# Patient Record
Sex: FEMALE | Race: WHITE | Employment: FULL TIME | URBAN - METROPOLITAN AREA
[De-identification: names, ages, dates, MRNs, and addresses within clinical notes are randomized per-mention and may not be internally consistent; named-entity substitution may affect disease eponyms.]

---

## 2019-12-18 LAB — MAMMOGRAPHY, EXTERNAL: NORMAL

## 2020-09-08 ENCOUNTER — OFFICE VISIT (OUTPATIENT)
Dept: ORTHOPEDIC SURGERY | Age: 60
End: 2020-09-08
Payer: COMMERCIAL

## 2020-09-08 VITALS — WEIGHT: 196 LBS | TEMPERATURE: 97.8 F | HEIGHT: 63 IN | BODY MASS INDEX: 34.73 KG/M2

## 2020-09-08 DIAGNOSIS — M17.12 PRIMARY OSTEOARTHRITIS OF LEFT KNEE: Primary | ICD-10-CM

## 2020-09-08 DIAGNOSIS — M25.562 ACUTE PAIN OF LEFT KNEE: ICD-10-CM

## 2020-09-08 PROCEDURE — 20611 DRAIN/INJ JOINT/BURSA W/US: CPT | Performed by: ORTHOPAEDIC SURGERY

## 2020-09-08 PROCEDURE — 99214 OFFICE O/P EST MOD 30 MIN: CPT | Performed by: ORTHOPAEDIC SURGERY

## 2020-09-08 RX ORDER — LIDOCAINE HYDROCHLORIDE 10 MG/ML
9 INJECTION INFILTRATION; PERINEURAL ONCE
Status: COMPLETED | OUTPATIENT
Start: 2020-09-08 | End: 2020-09-08

## 2020-09-08 RX ORDER — LISINOPRIL AND HYDROCHLOROTHIAZIDE 12.5; 2 MG/1; MG/1
TABLET ORAL
COMMUNITY
Start: 2020-06-15 | End: 2021-04-30 | Stop reason: SDUPTHER

## 2020-09-08 RX ORDER — TRIAMCINOLONE ACETONIDE 40 MG/ML
40 INJECTION, SUSPENSION INTRA-ARTICULAR; INTRAMUSCULAR ONCE
Status: COMPLETED | OUTPATIENT
Start: 2020-09-08 | End: 2020-09-08

## 2020-09-08 RX ADMIN — LIDOCAINE HYDROCHLORIDE 9 ML: 10 INJECTION INFILTRATION; PERINEURAL at 16:54

## 2020-09-08 RX ADMIN — TRIAMCINOLONE ACETONIDE 40 MG: 40 INJECTION, SUSPENSION INTRA-ARTICULAR; INTRAMUSCULAR at 16:54

## 2020-09-08 NOTE — LETTER
Gait: Norm  Limp  [id number] [place]   Chair   Strech Lonita Roles  :1960     Today's Date:2020 WRS:[id number]                                                 KNEE/LEG Est  2  3  4 New  2  3   Consult  3    Pre-op         Post-op     No LOS Injection Utrasound  Injection NO Ultrasound [address] [place] KNEE 
LT Pain   M25.562             RT Pain         M25.561 LT OA     M17.12             RT OA             M17.11 LT ACL           S83.512A             RT ACL       S83.511A  
LT Chondromalacia   M22.42                   RT Chondromalacia    M22.41   
LT Internal derangement M23.92             RT Internal derangement   M23.91  
LT Loose Body        M23.42            RT Loose Body          M23.41 
LT OCD        M93.262            RT OCD       M93.261 LT lateral meniscus  S83.262A            RT lateral meniscus     S83.261A  
LT MCL       S83.412A            RT MCL       S83.411A   
LT medial menisus      S83.222A  RT medial menisus    S83.221A      
LT prepatellar bursitiS M70.42  RT prepatellar bursitis       M70.41 LT Quad tendon rupture  [id number] RT Quad tendon rupture   [id number] KNEE FRACTURE Knee FX [id number]     KneeCap FX 51033     Fibula FX   [id number] LT FX lateral condyle    S82.125A  RT FX lateral condyle     S82.124A LT FX medial condyle    S82.135A  RT FX medial condyle     S82.134A LT FX tibial spine            S82.115A  RT FX tibial spine     S82.114A LT FX transverse patella S82.035A  RT FX transverse patella S82.034A LEG FRACTURE Tibia shaft FX   [id number]      LT tibia shaft  S83.202A RT Tibia shaft  S83.201A      
LT Cellulitis L03. 116 RT cellulitis L03.115 Edema    R50.9

## 2020-09-08 NOTE — PROGRESS NOTES
Name: Reymundo Bass    : 1960  Service Dept: [address] [facility] MEDICINE       Chief Complaint   Patient presents with    Knee Pain     left        Patient's Pharmacies:    [address] [facility] [facility]  [address] [facility] [address] [address]  Phone: [phone] Fax: [phone]       Visit Vitals  Temp 97.8 °F (36.6 °C)   Ht 5' 3\" (1.6 m)   Wt 196 lb (88.9 kg)   BMI 34.72 kg/m²        No Known Allergies     Current Outpatient Medications   Medication Sig Dispense Refill    lisinopril-hydroCHLOROthiazide (PRINZIDE, ZESTORETIC) 20-12.5 mg per tablet        Current Facility-Administered Medications   Medication Dose Route Frequency Provider Last Rate Last Dose    lidocaine (XYLOCAINE) 10 mg/mL (1 %) injection 9 mL  9 mL Intra artICUlar ONCE Timothy Pimentel MD        triamcinolone acetonide (KENALOG-40) 40 mg/mL injection 40 mg  40 mg Intra artICUlar ONCE Timothy Pimentel MD            There is no problem list on file for this patient. No family history on file. Social History     Socioeconomic History    Marital status:      Spouse name: Not on file    Number of children: Not on file    Years of education: Not on file    Highest education level: Not on file   Tobacco Use    Smoking status: Never Smoker    Smokeless tobacco: Never Used   Substance and Sexual Activity    Alcohol use: Yes     Comment: occationally    Drug use: Never        Past Surgical History:   Procedure Laterality Date    HX KNEE REPLACEMENT Right 2020        Past Medical History:   Diagnosis Date    Hypertension         I have reviewed and agree with [address] [place] [address] Nw and ROS and intake form in chart and the record.      Review of Systems:   Patient is a pleasant appearing individual, appropriately dressed, well hydrated, well nourished, who is alert, appropriately oriented for age, and in no acute distress with a normal gait and normal affect who does not appear to be in any significant pain. Physical Exam:  Left Knee -Decrease range of motion with flexion, Knee arc of greater than 50 degrees, Some crepitation, Grossly neurovascularly intact, Good cap refill, No skin lesion, Moderate swelling, No gross instability, Some quadriceps weakness, Kellgren and Maykel at least grade 3    Right Knee - Full Range of Motion, No crepitation, Grossly neurovascularly intact, Good cap refill, No skin lesion, No swelling, No gross instability, No quadriceps weakness    Procedure Documentation:    Please note that ModiFace ultrasound was used to perform an ultrasound guided injection into the left knee. A pre-injection ultrasound was taken of the left knee. After the needle was placed into the anterolateral portal(s) and while the kenelog was injected another ultrasound picture confirmed the appropriate placement. The site of injection, left knee, was sterilely prepped. The injection of 40 mg Kenalog and Lidocaine was administered appropriately into left knee and the patient tolerated it well. No site reaction was identified. Appropriate dressing was placed. Consent was obtained for the injection. Encounter Diagnoses     ICD-10-CM ICD-9-CM   1. Primary osteoarthritis of left knee  M17.12 715.16   2. Acute pain of left knee  M25.562 719.46          Scribed by Josephine Bravo LPN as dictated by [facility] Cruz MD.    HPI:  The patient is here with a chief complaint of left knee pain, sharp, throbbing pain for about five months. It has been the same. Tylenol has helped. Walking makes it worse. Pain is 5/10. ROS:  10-point review of systems is positive for joint swelling and stiffness. X-rays are positive for osteoarthritis. Assessment/Plan:  1. Left knee osteoarthritic pain. Plan would be for cortisone injection. We will see the patient back in three weeks. If it helps, that is all we need to do and we will go from there.        Return to Office: Follow-up Information    None             Documentation True and Accepted Timothy Harris, MD

## 2020-09-08 NOTE — LETTER
Mary Pay 1960  
[id number]  
 
 
9/8/2020 I hereby authorize and direct Timothy Mcfarlane MD, Charmaine Stephenson, and whomever he may designate as his associate to perform upon myself the following procedure: 
 
Injection of: Kenalog, Supartz, Euflexxa, Orthovisc in the Right/Left ____________________. If any unforeseen condition arises in the course of the procedure, I further authorize him and his associated and/or assistant(s) to do whatever he/she deems advisable. The nature, purpose, benefits, risks, side effects, likelihood of achieving goals, and potential problems that might occur during recuperation, risks for not receiving the proposed care, treatment and services and alternatives of the procedure have been fully explained to me by my physician including, but not limited to: 
 
Swelling, joint pain, skin pigment changes, worsening of condition, and failure to improve. I acknowledge that no guarantee or assurance has been made to me as to the results that may be obtained or the likelihood of success. _______________________________________ Signature of patient or authorized representative [employer] [facility] fax: [phone]

## 2020-09-08 NOTE — PROGRESS NOTES
Providers protocol for the intake nurse to complete in patient's chart:    Raymond Malloy presents today for   Chief Complaint   Patient presents with    Knee Pain     left       Reason for visit  Pain assessment   Height  Weight    Temperature is taken by [facility]  Travel Screening done by [facility]    Provider will complete the patient's chart

## 2020-09-08 NOTE — PATIENT INSTRUCTIONS
Knee Pain or Injury: Care Instructions  Your Care Instructions     Injuries are a common cause of knee problems. Sudden (acute) injuries may be caused by a direct blow to the knee. They can also be caused by abnormal twisting, bending, or falling on the knee. Pain, bruising, or swelling may be severe, and may start within minutes of the injury. Overuse is another cause of knee pain. Other causes are climbing stairs, kneeling, and other activities that use the knee. Everyday wear and tear, especially as you get older, also can cause knee pain. Rest, along with home treatment, often relieves pain and allows your knee to heal. If you have a serious knee injury, you may need tests and treatment. Follow-up care is a key part of your treatment and safety. Be sure to make and go to all appointments, and call your doctor if you are having problems. It's also a good idea to know your test results and keep a list of the medicines you take. How can you care for yourself at home? · Be safe with medicines. Read and follow all instructions on the label. ? If the doctor gave you a prescription medicine for pain, take it as prescribed. ? If you are not taking a prescription pain medicine, ask your doctor if you can take an over-the-counter medicine. · Rest and protect your knee. Take a break from any activity that may cause pain. · Put ice or a cold pack on your knee for 10 to 20 minutes at a time. Put a thin cloth between the ice and your skin. · Prop up a sore knee on a pillow when you ice it or anytime you sit or lie down for the next 3 days. Try to keep it above the level of your heart. This will help reduce swelling. · If your knee is not swollen, you can put moist heat, a heating pad, or a warm cloth on your knee. · If your doctor recommends an elastic bandage, sleeve, or other type of support for your knee, wear it as directed.   · Follow your doctor's instructions about how much weight you can put on your leg. Use a cane, crutches, or a walker as instructed. · Follow your doctor's instructions about activity during your healing process. If you can do mild exercise, slowly increase your activity. · Reach and stay at a healthy weight. Extra weight can strain the joints, especially the knees and hips, and make the pain worse. Losing even a few pounds may help. When should you call for help? Call 911 anytime you think you may need emergency care. For example, call if:    · You have symptoms of a blood clot in your lung (called a pulmonary embolism). These may include:  ? Sudden chest pain. ? Trouble breathing. ? Coughing up blood. Call your doctor now or seek immediate medical care if:    · You have severe or increasing pain.     · Your leg or foot turns cold or changes color.     · You cannot stand or put weight on your knee.     · Your knee looks twisted or bent out of shape.     · You cannot move your knee.     · You have signs of infection, such as:  ? Increased pain, swelling, warmth, or redness. ? Red streaks leading from the knee. ? Pus draining from a place on your knee. ? A fever.     · You have signs of a blood clot in your leg (called a deep vein thrombosis), such as:  ? Pain in your calf, back of the knee, thigh, or groin. ? Redness and swelling in your leg or groin. Watch closely for changes in your health, and be sure to contact your doctor if:    · You have tingling, weakness, or numbness in your knee.     · You have any new symptoms, such as swelling.     · You have bruises from a knee injury that last longer than 2 weeks.     · You do not get better as expected. Where can you learn more? Go to http://www.gray.com/  Enter K195 in the search box to learn more about \"Knee Pain or Injury: Care Instructions. \"  Current as of: June 26, 2019               Content Version: 12.6  © 1995-7726 [employer].    Care instructions adapted under license by [employer] (which disclaims liability or warranty for this information). If you have questions about a medical condition or this instruction, always ask your healthcare professional. [address] any warranty or liability for your use of this information.

## 2020-11-24 ENCOUNTER — OFFICE VISIT (OUTPATIENT)
Dept: FAMILY MEDICINE CLINIC | Age: 60
End: 2020-11-24
Payer: COMMERCIAL

## 2020-11-24 VITALS
HEIGHT: 64 IN | SYSTOLIC BLOOD PRESSURE: 126 MMHG | HEART RATE: 94 BPM | TEMPERATURE: 97.2 F | DIASTOLIC BLOOD PRESSURE: 76 MMHG | BODY MASS INDEX: 34.18 KG/M2

## 2020-11-24 DIAGNOSIS — D69.2 PURPURA (HCC): ICD-10-CM

## 2020-11-24 DIAGNOSIS — I10 ESSENTIAL HYPERTENSION: ICD-10-CM

## 2020-11-24 DIAGNOSIS — D69.2 PURPURA (HCC): Primary | ICD-10-CM

## 2020-11-24 PROCEDURE — 99213 OFFICE O/P EST LOW 20 MIN: CPT | Performed by: FAMILY MEDICINE

## 2020-11-24 NOTE — PROGRESS NOTES
Subjective:   Maykel Palomino is a 56 y.o. female who was seen for Neck Pain (left sided)    HPI the patient is a 57-year-old female her  has end-stage lymphoma probably he has been taking care of at the [facility]. There has been no nausea vomiting or diarrhea. There has been no rash or other problems. Has been eating relatively well. She awoke this morning with pain in the left side of her neck. She has not had that before she wonders if she is feeling something there she is quite frightened about lymphoma. She has had no injuries. She is eating well. Her bowel movements have been appropriate no falls or injuries. She has had no fever or chills. She is trying to work as much as possible. He is also had some bruising when she is using anti-inflammatory medications she is concerned about that as well. Home Medications    Medication Sig Start Date End Date Taking? Authorizing Provider   lisinopril-hydroCHLOROthiazide (PRINZIDE, ZESTORETIC) 20-12.5 mg per tablet  6/15/20   Provider, Historical      No Known Allergies  Social History     Tobacco Use    Smoking status: Never Smoker    Smokeless tobacco: Never Used   Substance Use Topics    Alcohol use: Yes     Comment: occationally    Drug use: Never            Review of Systems   Constitutional: Negative. HENT: Negative. Eyes: Negative. Respiratory: Negative. Cardiovascular: Negative. Gastrointestinal: Negative. Endocrine: Negative. Genitourinary: Negative. Musculoskeletal: Positive for neck pain. Allergic/Immunologic: Negative. Neurological: Negative. Psychiatric/Behavioral: The patient is nervous/anxious.          Physical Exam   Objective:     Visit Vitals  /76   Pulse 94   Temp 97.2 °F (36.2 °C)   Ht 5' 3.5\" (1.613 m)   BMI 34.18 kg/m²      General: alert, cooperative, no distress   Mental  status: normal mood, behavior, speech, dress, motor activity, and thought processes, able to follow commands   HENT: NCAT   Neck: no visualized mass   Resp: no respiratory distress   Neuro: no gross deficits   Skin: no discoloration or lesions of concern on visible areas   Psychiatric: normal affect, consistent with stated mood, no evidence of hallucinations   There is tenderness in the left side of the neck and interestingly it is in the area of the sternocleidomastoid muscle I do not feel any adenopathy it is classically tender over that muscle which makes me wonder if she is strained it with sleeping or with activities as well. Assessment & Plan:     Neck strain: This feels like it is the muscle itself I do not feel any nodes or anything else. I have asked her to use heat 4 times a day and use Tylenol. She also has some concerns about bruising that will give me a platelet count as well and I will call her with results      712    Additional exam findings: We discussed the expected course, resolution and complications of the diagnosis(es) in detail. Medication risks, benefits, costs, interactions, and alternatives were discussed as indicated. I advised her to contact the office if her condition worsens, changes or fails to improve as anticipated. She expressed understanding with the diagnosis(es) and plan.

## 2020-11-24 NOTE — PROGRESS NOTES
Josh Harris presents today for   Chief Complaint   Patient presents with    Neck Pain     left sided       Is someone accompanying this pt? No  Is the patient using any DME equipment during OV? No    Depression Screening:  3 most recent PHQ Screens 11/24/2020   Little interest or pleasure in doing things Not at all   Feeling down, depressed, irritable, or hopeless Not at all   Total Score PHQ 2 0       Learning Assessment:  Learning Assessment 9/8/2020   PRIMARY LEARNER Patient   HIGHEST LEVEL OF EDUCATION - PRIMARY LEARNER  SOME COLLEGE   BARRIERS PRIMARY LEARNER NONE   PRIMARY LANGUAGE ENGLISH   LEARNER PREFERENCE PRIMARY LISTENING   ANSWERED BY patient   RELATIONSHIP SELF       Fall Risk  Fall Risk Assessment, last 12 mths 11/24/2020   Able to walk? Yes   Fall in past 12 months? No       ADL  ADL Assessment 11/24/2020   Feeding yourself No Help Needed   Getting from bed to chair No Help Needed   Getting dressed No Help Needed   Bathing or showering No Help Needed   Walk across the room (includes cane/walker) No Help Needed   Using the telphone No Help Needed   Taking your medications No Help Needed   Preparing meals No Help Needed   Managing money (expenses/bills) No Help Needed   Moderately strenuous housework (laundry) No Help Needed   Shopping for personal items (toiletries/medicines) No Help Needed   Shopping for groceries No Help Needed   Driving No Help Needed   Climbing a flight of stairs No Help Needed   Getting to places beyond walking distances No Help Needed       Health Maintenance reviewed and discussed and ordered per Provider.     Health Maintenance Due   Topic Date Due    Hepatitis C Screening  1960    DTaP/Tdap/Td series (1 - Tdap) 06/28/1979    PAP AKA CERVICAL CYTOLOGY  06/28/1979    Lipid Screen  06/28/1998    Shingrix Vaccine Age 50> (1 of 2) 06/28/2008    Colorectal Cancer Screening Combo  06/28/2008    Breast Cancer Screen Mammogram  06/28/2008    Flu Vaccine (1) 09/01/2020   . Coordination of Care:  1. Have you been to the ER, urgent care clinic since your last visit? Hospitalized since your last visit? No    2. Have you seen or consulted any other health care providers outside of the [address] [place] [address] since your last visit? Include any pap smears or colon screening.  No

## 2020-11-25 ENCOUNTER — HOSPITAL ENCOUNTER (OUTPATIENT)
Dept: LAB | Age: 60
Discharge: HOME | End: 2020-11-25
Payer: COMMERCIAL

## 2020-11-25 LAB
ALBUMIN SERPL-MCNC: 3.7 G/DL (ref 3.5–4.7)
ALBUMIN/GLOB SERPL: 1.2 {RATIO}
ALP SERPL-CCNC: 91 U/L (ref 38–126)
ALT SERPL-CCNC: 15 U/L (ref 3–52)
ANION GAP SERPL CALC-SCNC: 8 MMOL/L
AST SERPL W P-5'-P-CCNC: 18 U/L (ref 14–74)
BASOPHILS # BLD: 0.1 K/UL (ref 0–0.1)
BASOPHILS NFR BLD: 1 % (ref 0–2)
BILIRUB SERPL-MCNC: 0.8 MG/DL (ref 0.2–1)
BUN SERPL-MCNC: 29 MG/DL (ref 9–21)
BUN/CREAT SERPL: 32
CA-I BLD-MCNC: 9 MG/DL (ref 8.5–10.5)
CHLORIDE SERPL-SCNC: 106 MMOL/L (ref 94–111)
CO2 SERPL-SCNC: 25 MMOL/L (ref 21–33)
CREAT SERPL-MCNC: 0.9 MG/DL (ref 0.7–1.2)
EOSINOPHIL # BLD: 0.2 K/UL (ref 0–0.4)
EOSINOPHIL NFR BLD: 3 % (ref 0–5)
ERYTHROCYTE [DISTWIDTH] IN BLOOD BY AUTOMATED COUNT: 13.4 % (ref 11.6–14.5)
GLOBULIN SER CALC-MCNC: 3.1 G/DL
GLUCOSE SERPL-MCNC: 95 MG/DL (ref 70–110)
HCT VFR BLD AUTO: 37.7 % (ref 35–45)
HGB BLD-MCNC: 11.7 G/DL (ref 12–16)
IMM GRANULOCYTES # BLD AUTO: 0 K/UL
IMM GRANULOCYTES NFR BLD AUTO: 0 %
LYMPHOCYTES # BLD: 2.5 K/UL (ref 0.9–3.6)
LYMPHOCYTES NFR BLD: 39 % (ref 21–52)
MCH RBC QN AUTO: 27.7 PG (ref 24–34)
MCHC RBC AUTO-ENTMCNC: 31 G/DL (ref 31–37)
MCV RBC AUTO: 89.3 FL (ref 74–97)
MONOCYTES # BLD: 0.6 K/UL (ref 0.05–1.2)
MONOCYTES NFR BLD: 9 % (ref 3–10)
NEUTS SEG # BLD: 3.1 K/UL (ref 1.8–8)
NEUTS SEG NFR BLD: 48 % (ref 40–73)
PLATELET # BLD AUTO: 325 K/UL (ref 135–420)
PMV BLD AUTO: 10.6 FL (ref 9.2–11.8)
POTASSIUM SERPL-SCNC: 4.2 MMOL/L (ref 3.2–5.1)
PROT SERPL-MCNC: 6.8 G/DL (ref 6.1–8.4)
RBC # BLD AUTO: 4.22 M/UL (ref 4.2–5.3)
SODIUM SERPL-SCNC: 139 MMOL/L (ref 135–145)
WBC # BLD AUTO: 6.5 K/UL (ref 4.6–13.2)

## 2020-11-25 PROCEDURE — 80053 COMPREHEN METABOLIC PANEL: CPT

## 2020-11-25 PROCEDURE — 85025 COMPLETE CBC W/AUTO DIFF WBC: CPT

## 2020-11-25 PROCEDURE — 36415 COLL VENOUS BLD VENIPUNCTURE: CPT

## 2020-12-05 ENCOUNTER — TELEPHONE (OUTPATIENT)
Dept: FAMILY MEDICINE CLINIC | Age: 60
End: 2020-12-05

## 2020-12-05 NOTE — TELEPHONE ENCOUNTER
Call the patient to tell her that all of her lab work was normal.  I suspect she is already been on the portal.  No change in therapy

## 2020-12-07 DIAGNOSIS — M25.562 ACUTE PAIN OF LEFT KNEE: Primary | ICD-10-CM

## 2020-12-09 ENCOUNTER — OFFICE VISIT (OUTPATIENT)
Dept: SURGERY | Age: 60
End: 2020-12-09
Payer: COMMERCIAL

## 2020-12-09 ENCOUNTER — HOSPITAL ENCOUNTER (OUTPATIENT)
Dept: LAB | Age: 60
Discharge: HOME | End: 2020-12-09
Payer: COMMERCIAL

## 2020-12-09 VITALS — TEMPERATURE: 98 F

## 2020-12-09 DIAGNOSIS — D48.5 NEOPLASM OF UNCERTAIN BEHAVIOR OF SKIN: Primary | ICD-10-CM

## 2020-12-09 PROCEDURE — 11104 PUNCH BX SKIN SINGLE LESION: CPT | Performed by: SURGERY

## 2020-12-09 PROCEDURE — 99203 OFFICE O/P NEW LOW 30 MIN: CPT | Performed by: SURGERY

## 2020-12-09 PROCEDURE — 88305 TISSUE EXAM BY PATHOLOGIST: CPT

## 2020-12-09 RX ORDER — AMOXICILLIN 500 MG/1
CAPSULE ORAL
COMMUNITY
Start: 2020-12-03 | End: 2021-12-16 | Stop reason: ALTCHOICE

## 2020-12-09 NOTE — PROGRESS NOTES
Eva Nose presents today for   Chief Complaint   Patient presents with    Neoplasm Of The Skin     Lt lower leg       Is someone accompanying this pt? No    Is the patient using any DME equipment during OV? No    Depression Screening:  3 most recent PHQ Screens 12/9/2020   Little interest or pleasure in doing things Not at all   Feeling down, depressed, irritable, or hopeless Not at all   Total Score PHQ 2 0       Learning Assessment:  Learning Assessment 9/8/2020   PRIMARY LEARNER Patient   HIGHEST LEVEL OF EDUCATION - PRIMARY LEARNER  SOME COLLEGE   BARRIERS PRIMARY LEARNER NONE   PRIMARY LANGUAGE ENGLISH   LEARNER PREFERENCE PRIMARY LISTENING   ANSWERED BY patient   RELATIONSHIP SELF       Abuse Screening:  Abuse Screening Questionnaire 11/24/2020   Do you ever feel afraid of your partner? N   Are you in a relationship with someone who physically or mentally threatens you? N   Is it safe for you to go home? Y       Fall Risk  Fall Risk Assessment, last 12 mths 11/24/2020   Able to walk? Yes   Fall in past 12 months? No       Coordination of Care:  1. Have you been to the ER, urgent care clinic since your last visit? Hospitalized since your last visit? No    2. Have you seen or consulted any other health care providers outside of the [address] [place] [address] since your last visit? Include any pap smears or colon screening.  No

## 2020-12-09 NOTE — PROGRESS NOTES
History of Present Illness  Chacho Vargas is a 56 y.o. female presents for Neoplasm Of The Skin (Lt lower leg)     56year old female with lesion left lower leg for several years. No history of skin cancer. She states the lesion is itchy, occasionally scales and falls off and regrows. Review of Systems   Constitutional: Negative for chills and fever. Respiratory: Negative for cough, hemoptysis, sputum production, shortness of breath and wheezing. Cardiovascular: Negative for chest pain and palpitations. Gastrointestinal: Negative for abdominal pain.          Current Outpatient Medications:     amoxicillin (AMOXIL) 500 mg capsule, TAKE 1 CAPSULE BY MOUTH THREE TIMES DAILY FOR 10 DAYS, Disp: , Rfl:     lisinopril-hydroCHLOROthiazide (PRINZIDE, ZESTORETIC) 20-12.5 mg per tablet, , Disp: , Rfl:     No Known Allergies    Past Medical History:   Diagnosis Date    Hypertension        Past Surgical History:   Procedure Laterality Date    HX KNEE REPLACEMENT Right 03/01/2020    HX TUBAL LIGATION         Family History   Problem Relation Age of Onset    Heart Disease Father         Social History     Socioeconomic History    Marital status:      Spouse name: Not on file    Number of children: Not on file    Years of education: Not on file    Highest education level: Not on file   Occupational History    Not on file   Social Needs    Financial resource strain: Not on file    Food insecurity     Worry: Not on file     Inability: Not on file    Transportation needs     Medical: Not on file     Non-medical: Not on file   Tobacco Use    Smoking status: Never Smoker    Smokeless tobacco: Never Used   Substance and Sexual Activity    Alcohol use: Yes     Comment: occationally    Drug use: Never    Sexual activity: Not on file   Lifestyle    Physical activity     Days per week: Not on file     Minutes per session: Not on file    Stress: Not on file   Relationships    Social connections Talks on phone: Not on file     Gets together: Not on file     Attends Yazidi service: Not on file     Active member of club or organization: Not on file     Attends meetings of clubs or organizations: Not on file     Relationship status: Not on file    Intimate partner violence     Fear of current or ex partner: Not on file     Emotionally abused: Not on file     Physically abused: Not on file     Forced sexual activity: Not on file   Other Topics Concern    Not on file   Social History Narrative    Not on file       XR Results (maximum last 2): No results found for this or any previous visit. CT Results (maximum last 2): No results found for this or any previous visit. MRI Results (maximum last 2): No results found for this or any previous visit. Nuclear Medicine Results (maximum last 3): No results found for this or any previous visit. US Results (maximum last 2): No results found for this or any previous visit. DOREEN Results (maximum last 2): No results found for this or any previous visit. IR Results (maximum last 2): No results found for this or any previous visit. VAS/US Results (maximum last 2): No results found for this or any previous visit. PET Results (maximum last 2): No results found for this or any previous visit. Visit Vitals  Temp 98 °F (36.7 °C)        Physical Exam  Constitutional:       Appearance: Normal appearance. She is normal weight. HENT:      Head: Normocephalic and atraumatic. Eyes:      Extraocular Movements: Extraocular movements intact. Conjunctiva/sclera: Conjunctivae normal.      Pupils: Pupils are equal, round, and reactive to light. Pulmonary:      Effort: Pulmonary effort is normal.   Skin:     Comments: 0.5cm scaly red plaque lesion left lateral leg, non tender   Neurological:      General: No focal deficit present. Mental Status: She is alert and oriented to person, place, and time. Mental status is at baseline. Psychiatric:         Mood and Affect: Mood normal.         Behavior: Behavior normal.         Thought Content: Thought content normal.         Judgment: Judgment normal.     Procedure: After verbal informed consent was obtained the skin was cleansed with alcohol. 3cc of 1% lidocaine with epinephrine were used to anesthetize the skin. A 2.5mm punch biopsy was obtained centrally on the lesion and sent for specimen. Pressure was applied. The wound was hemostatic. Bacitracin ointment and bandaid were applied. The patient tolerated the procedure well and voiced no complaints. Assessment and Plan:  1. Neoplasm of uncertain behavior of the skin    She tolerated punch biopsy in the clinic without complications. She can wash and shower over the area daily and pat dry. I will call her once pathology is returned. Liv Rodriguez, DO    CC Providers:  Loy Landers MD  No ref.  provider found

## 2020-12-15 DIAGNOSIS — M25.562 ACUTE PAIN OF LEFT KNEE: Primary | ICD-10-CM

## 2020-12-15 RX ORDER — CEPHALEXIN 500 MG/1
500 CAPSULE ORAL
Qty: 4 CAP | Refills: 0 | Status: SHIPPED | OUTPATIENT
Start: 2020-12-15 | End: 2020-12-15

## 2021-01-18 ENCOUNTER — HOSPITAL ENCOUNTER (OUTPATIENT)
Dept: LAB | Age: 61
Discharge: HOME | End: 2021-01-18
Payer: COMMERCIAL

## 2021-01-18 ENCOUNTER — OFFICE VISIT (OUTPATIENT)
Dept: FAMILY MEDICINE CLINIC | Age: 61
End: 2021-01-18
Payer: COMMERCIAL

## 2021-01-18 VITALS
HEIGHT: 63 IN | SYSTOLIC BLOOD PRESSURE: 148 MMHG | DIASTOLIC BLOOD PRESSURE: 82 MMHG | HEART RATE: 95 BPM | RESPIRATION RATE: 18 BRPM | OXYGEN SATURATION: 97 % | WEIGHT: 214 LBS | BODY MASS INDEX: 37.92 KG/M2 | TEMPERATURE: 98.2 F

## 2021-01-18 DIAGNOSIS — I10 ESSENTIAL HYPERTENSION: ICD-10-CM

## 2021-01-18 DIAGNOSIS — D69.2 PURPURA (HCC): ICD-10-CM

## 2021-01-18 DIAGNOSIS — R89.9 ABNORMAL LABORATORY TEST: ICD-10-CM

## 2021-01-18 DIAGNOSIS — Z98.84 BARIATRIC SURGERY STATUS: ICD-10-CM

## 2021-01-18 DIAGNOSIS — F41.0 PANIC ATTACK: Primary | ICD-10-CM

## 2021-01-18 LAB — D DIMER PPP FEU-MCNC: 1.17 UG/ML(FEU)

## 2021-01-18 PROCEDURE — 85379 FIBRIN DEGRADATION QUANT: CPT

## 2021-01-18 PROCEDURE — 99213 OFFICE O/P EST LOW 20 MIN: CPT | Performed by: FAMILY MEDICINE

## 2021-01-18 PROCEDURE — 36415 COLL VENOUS BLD VENIPUNCTURE: CPT

## 2021-01-18 RX ORDER — OMEPRAZOLE 40 MG/1
40 CAPSULE, DELAYED RELEASE ORAL DAILY
Qty: 30 CAP | Refills: 1 | Status: SHIPPED | OUTPATIENT
Start: 2021-01-18 | End: 2021-12-16

## 2021-01-18 NOTE — PROGRESS NOTES
Patient in office for ER follow up this weekend with sudden onset of chest pain straight through back, hands and feet tingling, nausea, sweating. The only thing they found was an elevated D-Dimer of 1290. She has noticed abnormal bruising recently. 1. Have you been to the ER, urgent care clinic since your last visit? Hospitalized since your last visit? Yes When: Saturday, [place], ER    2. Have you seen or consulted any other health care providers outside of the [address] [place] [address] since your last visit? Include any pap smears or colon screening.  No

## 2021-01-18 NOTE — PROGRESS NOTES
Subjective:   Isidra Ivy is a 56 y.o. female who was seen for ED Follow-up (Elevated D Dimer and Chest Pain, SOB, nausea, hands and feet tingling)    HPI the patient is a 56-year-old female who is seen for follow-up today she was in the emergency room over the weekend was having some sharp pain which went through her chest which she has had on several occasions. She apparently has been worked up for that she went to the emergency room because she became sweaty and quite anxious they did a work-up which including blood work a D-dimer was elevated quite high. They did a CAT scan which was unremarkable. The symptoms resolved her blood pressure had been elevated but went down to normal as well she feels perfectly fine they did not treat her with anything aggressively. She had no falls or injuries no rash no syncope no loss of consciousness. Bowel movements have been appropriate. She is eating better. She had gastric bypass surgery and then gained most of her weight back. She has not been followed by bariatric she has not been followed by gastroenterology she is not on any medication for acid as well. She has medication for hypertension    Home Medications    Medication Sig Start Date End Date Taking? Authorizing Provider   lisinopril-hydroCHLOROthiazide (PRINZIDE, ZESTORETIC) 20-12.5 mg per tablet  6/15/20  Yes Provider, Historical   amoxicillin (AMOXIL) 500 mg capsule TAKE 1 CAPSULE BY MOUTH THREE TIMES DAILY FOR 10 DAYS 12/3/20   Provider, Historical      No Known Allergies  Social History     Tobacco Use    Smoking status: Never Smoker    Smokeless tobacco: Never Used   Substance Use Topics    Alcohol use: Yes     Comment: occationally    Drug use: Never            Review of Systems   Constitutional: Negative. HENT: Negative. Eyes: Negative. Respiratory: Negative. Cardiovascular: Negative. Gastrointestinal: Negative. Endocrine: Negative. Musculoskeletal: Negative. Allergic/Immunologic: Negative. Neurological: Negative. Physical Exam   Objective:     Visit Vitals  BP (!) 148/82 (BP 1 Location: Right arm, BP Patient Position: Sitting)   Pulse 95   Temp 98.2 °F (36.8 °C) (Temporal)   Resp 18   Ht 5' 3\" (1.6 m)   Wt 214 lb (97.1 kg)   SpO2 97%   BMI 37.91 kg/m²      General: alert, cooperative, no distress   Mental  status: normal mood, behavior, speech, dress, motor activity, and thought processes, able to follow commands   HENT: NCAT   Neck: no visualized mass   Resp: no respiratory distress   Neuro: no gross deficits   Skin: no discoloration or lesions of concern on visible areas   Psychiatric: normal affect, consistent with stated mood, no evidence of hallucinations       Assessment & Plan:   Disorder, hypertension, status post bariatric surgery, purpura: I do not really see any problems with bruising at all all of her blood work was reviewed is normal I reviewed the CAT scan as well. I do not think she needs any medication I am going to repeat the D-dimer. Letter over to the lab and will follow up if any other issues arise. She is not significantly anxious she does not feel is cardiac she has had this initial issue on several occasions in the past.        712    Additional exam findings: We discussed the expected course, resolution and complications of the diagnosis(es) in detail. Medication risks, benefits, costs, interactions, and alternatives were discussed as indicated. I advised her to contact the office if her condition worsens, changes or fails to improve as anticipated. She expressed understanding with the diagnosis(es) and plan.

## 2021-05-01 RX ORDER — LISINOPRIL AND HYDROCHLOROTHIAZIDE 12.5; 2 MG/1; MG/1
TABLET ORAL
Qty: 90 TAB | Refills: 3 | Status: SHIPPED | OUTPATIENT
Start: 2021-05-01 | End: 2021-12-16 | Stop reason: SDUPTHER

## 2021-05-05 ENCOUNTER — TELEPHONE (OUTPATIENT)
Dept: FAMILY MEDICINE CLINIC | Age: 61
End: 2021-05-05

## 2021-05-20 ENCOUNTER — OFFICE VISIT (OUTPATIENT)
Dept: ORTHOPEDIC SURGERY | Age: 61
End: 2021-05-20
Payer: COMMERCIAL

## 2021-05-20 DIAGNOSIS — M17.12 OSTEOARTHRITIS OF LEFT KNEE, UNSPECIFIED OSTEOARTHRITIS TYPE: ICD-10-CM

## 2021-05-20 DIAGNOSIS — M25.562 LEFT KNEE PAIN, UNSPECIFIED CHRONICITY: Primary | ICD-10-CM

## 2021-05-20 PROCEDURE — 99214 OFFICE O/P EST MOD 30 MIN: CPT | Performed by: ORTHOPAEDIC SURGERY

## 2021-05-20 PROCEDURE — 20611 DRAIN/INJ JOINT/BURSA W/US: CPT | Performed by: ORTHOPAEDIC SURGERY

## 2021-05-20 RX ORDER — LIDOCAINE HYDROCHLORIDE 10 MG/ML
9 INJECTION INFILTRATION; PERINEURAL ONCE
Status: COMPLETED | OUTPATIENT
Start: 2021-05-20 | End: 2021-05-20

## 2021-05-20 RX ORDER — TRIAMCINOLONE ACETONIDE 40 MG/ML
40 INJECTION, SUSPENSION INTRA-ARTICULAR; INTRAMUSCULAR ONCE
Status: COMPLETED | OUTPATIENT
Start: 2021-05-20 | End: 2021-05-20

## 2021-05-20 RX ADMIN — LIDOCAINE HYDROCHLORIDE 9 ML: 10 INJECTION INFILTRATION; PERINEURAL at 15:08

## 2021-05-20 RX ADMIN — TRIAMCINOLONE ACETONIDE 40 MG: 40 INJECTION, SUSPENSION INTRA-ARTICULAR; INTRAMUSCULAR at 15:09

## 2021-05-20 NOTE — PROGRESS NOTES
Name: Juanita García    : 1960     Service Dept: [facility]    Patient's Pharmacies:    [address] [facility] [address], [address]  [facility] [address]  Phone: [phone] Fax: [phone]       Chief Complaint   Patient presents with    Knee Pain        There were no vitals taken for this visit. No Known Allergies   Current Outpatient Medications   Medication Sig Dispense Refill    lisinopril-hydroCHLOROthiazide (PRINZIDE, ZESTORETIC) 20-12.5 mg per tablet Take one tablet daily 90 Tab 3    omeprazole (PRILOSEC) 40 mg capsule Take 1 Cap by mouth daily. 30 Cap 1    amoxicillin (AMOXIL) 500 mg capsule TAKE 1 CAPSULE BY MOUTH THREE TIMES DAILY FOR 10 DAYS        Patient Active Problem List   Diagnosis Code    Purpura ([facility] D69.2    Essential hypertension I10    Panic attack F41.0    Bariatric surgery status Z98.84    Abnormal laboratory test R89.9    Elevated blood sugar R73.9    Fitting and adjustment of gastric lap band Z46.51    GERD (gastroesophageal reflux disease) K21.9    Obesity (BMI 30-39. 9) E66.9    Osteoarthrosis, unspecified whether generalized or localized, unspecified site M19.90    LAP-BAND surgery status Z98.84      Family History   Problem Relation Age of Onset    Heart Disease Father       Social History     Socioeconomic History    Marital status:      Spouse name: Not on file    Number of children: Not on file    Years of education: Not on file    Highest education level: Not on file   Tobacco Use    Smoking status: Never Smoker    Smokeless tobacco: Never Used   Substance and Sexual Activity    Alcohol use: Yes     Comment: occationally    Drug use: Never     Social Determinants of Health     Financial Resource Strain:     Difficulty of Paying Living Expenses:    Food Insecurity:     Worried About Running Out of Food in the Last Year:     [address] in the Last Year: Transportation Needs:     Lack of Transportation (Medical):  Lack of Transportation (Non-Medical):    Physical Activity:     Days of Exercise per Week:     Minutes of Exercise per Session:    Stress:     Feeling of Stress :    Social Connections:     Frequency of Communication with Friends and Family:     Frequency of Social Gatherings with Friends and Family:     Attends Christian Services:     Active Member of Clubs or Organizations:     Attends Club or Organization Meetings:     Marital Status:       Past Surgical History:   Procedure Laterality Date    HX KNEE REPLACEMENT Right 03/01/2020    HX TUBAL LIGATION        Past Medical History:   Diagnosis Date    Hypertension         I have reviewed and agree with [address] [place] [address] Nw and ROS and intake form in chart and the record furthermore I have reviewed prior medical record(s) regarding this patients care during this appointment. Review of Systems:   Patient is a pleasant appearing individual, appropriately dressed, well hydrated, well nourished, who is alert, appropriately oriented for age, and in no acute distress with a normal gait and normal affect who does not appear to be in any significant pain. Physical Exam:  Left Knee -Decrease range of motion with flexion, Knee arc of greater than 50 degrees, Some crepitation, Grossly neurovascularly intact, Good cap refill, No skin lesion, Moderate swelling, No gross instability, Some quadriceps weakness, Kellgren and Maykel at least grade 3    Right Knee - Full Range of Motion, No crepitation, Grossly neurovascularly intact, Good cap refill, No skin lesion, No swelling, No gross instability, No quadriceps weakness    Procedure Documentation:    I discussed in detail the risks, benefits and complications of an injection which included but are not limited to infection, skin reactions, hot swollen joint, and anaphylaxis with the patient.  The patient verbalized understanding and gave informed consent for the injection. The patient's knee was flexed to 90° and the skin prepped using sterile alcohol solution. A sterile needle was inserted into the left knee and the mixture of 9 mL Lidocaine 1%, 1 mL Kenalog 40 mg was injected under sterile technique. The needle was withdrawn and the puncture site sealed with a Band-Aid. Technique: Under sterile conditions a [employer] ultrasound unit with a variable frequency (7.0-14.0 MHz) linear transducer was used to localize the placement of needle into the left knee joint. Findings: Successful needle placement for knee injection. Final images were taken and saved for permanent record. The patient tolerated the injection well. The patient was instructed to call the office immediately if there is any pain, redness, warmth, fever, or chills. Encounter Diagnoses     ICD-10-CM ICD-9-CM   1. Left knee pain, unspecified chronicity  M25.562 719.46   2. Osteoarthritis of left knee, unspecified osteoarthritis type  M17.12 715.96       HPI:  The patient is here with a chief complaint of left knee pain, throbbing burning pain, progressively getting worse, diagnosed with osteoarthritis. Assessment/Plan:  Plan would be for cortisone injection in left knee. If it helps, it is all we need to do. If it does not, we will consider other treatment options. We will see her back as needed and go from there. As part of continued conservative pain management options the patient was advised to utilize Tylenol or OTC NSAIDS as long as it is not medically contraindicated. Return to Office: Follow-up and Dispositions    · Return if symptoms worsen or fail to improve.         Administrations This Visit     lidocaine (XYLOCAINE) 10 mg/mL (1 %) injection 9 mL     Admin Date  05/20/2021 Action  Given Dose  9 mL Route  Other Administered By  Larry Moritz, LPN          triamcinolone acetonide (KENALOG-40) 40 mg/mL injection 40 mg     Admin Date  05/20/2021 Action  Given Dose  40 mg Route  Intra artICUlar Administered By  Ramila Reddy LPN               Scribed by Yoselin Cifuentes LPN as dictated by Alex Gomez. Sage Valle MD.  Documentation True and Accepted Timothy Valle MD

## 2021-05-20 NOTE — PATIENT INSTRUCTIONS
Knee Pain or Injury: Care Instructions  Your Care Instructions     Injuries are a common cause of knee problems. Sudden (acute) injuries may be caused by a direct blow to the knee. They can also be caused by abnormal twisting, bending, or falling on the knee. Pain, bruising, or swelling may be severe, and may start within minutes of the injury. Overuse is another cause of knee pain. Other causes are climbing stairs, kneeling, and other activities that use the knee. Everyday wear and tear, especially as you get older, also can cause knee pain. Rest, along with home treatment, often relieves pain and allows your knee to heal. If you have a serious knee injury, you may need tests and treatment. Follow-up care is a key part of your treatment and safety. Be sure to make and go to all appointments, and call your doctor if you are having problems. It's also a good idea to know your test results and keep a list of the medicines you take. How can you care for yourself at home? · Be safe with medicines. Read and follow all instructions on the label. ? If the doctor gave you a prescription medicine for pain, take it as prescribed. ? If you are not taking a prescription pain medicine, ask your doctor if you can take an over-the-counter medicine. · Rest and protect your knee. Take a break from any activity that may cause pain. · Put ice or a cold pack on your knee for 10 to 20 minutes at a time. Put a thin cloth between the ice and your skin. · Prop up a sore knee on a pillow when you ice it or anytime you sit or lie down for the next 3 days. Try to keep it above the level of your heart. This will help reduce swelling. · If your knee is not swollen, you can put moist heat, a heating pad, or a warm cloth on your knee. · If your doctor recommends an elastic bandage, sleeve, or other type of support for your knee, wear it as directed.   · Follow your doctor's instructions about how much weight you can put on your leg. Use a cane, crutches, or a walker as instructed. · Follow your doctor's instructions about activity during your healing process. If you can do mild exercise, slowly increase your activity. · Reach and stay at a healthy weight. Extra weight can strain the joints, especially the knees and hips, and make the pain worse. Losing even a few pounds may help. When should you call for help? Call 911 anytime you think you may need emergency care. For example, call if:    · You have symptoms of a blood clot in your lung (called a pulmonary embolism). These may include:  ? Sudden chest pain. ? Trouble breathing. ? Coughing up blood. Call your doctor now or seek immediate medical care if:    · You have severe or increasing pain.     · Your leg or foot turns cold or changes color.     · You cannot stand or put weight on your knee.     · Your knee looks twisted or bent out of shape.     · You cannot move your knee.     · You have signs of infection, such as:  ? Increased pain, swelling, warmth, or redness. ? Red streaks leading from the knee. ? Pus draining from a place on your knee. ? A fever.     · You have signs of a blood clot in your leg (called a deep vein thrombosis), such as:  ? Pain in your calf, back of the knee, thigh, or groin. ? Redness and swelling in your leg or groin. Watch closely for changes in your health, and be sure to contact your doctor if:    · You have tingling, weakness, or numbness in your knee.     · You have any new symptoms, such as swelling.     · You have bruises from a knee injury that last longer than 2 weeks.     · You do not get better as expected. Where can you learn more? Go to http://www.gray.com/  Enter K195 in the search box to learn more about \"Knee Pain or Injury: Care Instructions. \"  Current as of: February 26, 2020               Content Version: 12.8  © 5953-9665 [email or web address].    Care instructions adapted under license by [employer] (which disclaims liability or warranty for this information). If you have questions about a medical condition or this instruction, always ask your healthcare professional. [address] any warranty or liability for your use of this information.

## 2021-05-20 NOTE — LETTER
Jessi Woods 1960  
[id number]  
 
 
5/20/2021 I hereby authorize and direct Timothy Greene MD, Maribeth Nick, and whomever he may designate as his associate to perform upon myself the following procedure: 
 
Injection of: Kenalog, Supartz, Euflexxa, Orthovisc in the Right/Left ____________________. If any unforeseen condition arises in the course of the procedure, I further authorize him and his associated and/or assistant(s) to do whatever he/she deems advisable. The nature, purpose, benefits, risks, side effects, likelihood of achieving goals, and potential problems that might occur during recuperation, risks for not receiving the proposed care, treatment and services and alternatives of the procedure have been fully explained to me by my physician including, but not limited to: 
 
Swelling, joint pain, skin pigment changes, worsening of condition, and failure to improve. I acknowledge that no guarantee or assurance has been made to me as to the results that may be obtained or the likelihood of success. _______________________________________ Signature of patient or authorized representative [employer] [facility] fax: [phone]

## 2021-09-16 ENCOUNTER — OFFICE VISIT (OUTPATIENT)
Dept: ORTHOPEDIC SURGERY | Age: 61
End: 2021-09-16
Payer: COMMERCIAL

## 2021-09-16 VITALS — WEIGHT: 198 LBS | HEIGHT: 63 IN | BODY MASS INDEX: 35.08 KG/M2

## 2021-09-16 DIAGNOSIS — M25.571 RIGHT ANKLE PAIN, UNSPECIFIED CHRONICITY: Primary | ICD-10-CM

## 2021-09-16 DIAGNOSIS — M19.071 OSTEOARTHRITIS OF RIGHT ANKLE, UNSPECIFIED OSTEOARTHRITIS TYPE: ICD-10-CM

## 2021-09-16 PROCEDURE — 99214 OFFICE O/P EST MOD 30 MIN: CPT | Performed by: ORTHOPAEDIC SURGERY

## 2021-09-16 PROCEDURE — 20606 DRAIN/INJ JOINT/BURSA W/US: CPT | Performed by: ORTHOPAEDIC SURGERY

## 2021-09-16 RX ORDER — LIDOCAINE HYDROCHLORIDE 10 MG/ML
1 INJECTION INFILTRATION; PERINEURAL ONCE
Status: COMPLETED | OUTPATIENT
Start: 2021-09-16 | End: 2021-09-16

## 2021-09-16 RX ORDER — TRIAMCINOLONE ACETONIDE 40 MG/ML
40 INJECTION, SUSPENSION INTRA-ARTICULAR; INTRAMUSCULAR ONCE
Status: COMPLETED | OUTPATIENT
Start: 2021-09-16 | End: 2021-09-16

## 2021-09-16 RX ADMIN — LIDOCAINE HYDROCHLORIDE 1 ML: 10 INJECTION INFILTRATION; PERINEURAL at 17:00

## 2021-09-16 RX ADMIN — TRIAMCINOLONE ACETONIDE 40 MG: 40 INJECTION, SUSPENSION INTRA-ARTICULAR; INTRAMUSCULAR at 17:00

## 2021-09-16 NOTE — PROGRESS NOTES
Name: Bhavin Vines    : 1960     Service Dept: [facility]    Patient's Pharmacies:    [address], [address]  [place] [address]  Phone: [phone] Fax: [phone]       Chief Complaint   Patient presents with    Knee Pain    Foot Pain        Visit Vitals  Ht 5' 3\" (1.6 m)   Wt 198 lb (89.8 kg)   BMI 35.07 kg/m²      No Known Allergies   Current Outpatient Medications   Medication Sig Dispense Refill    lisinopril-hydroCHLOROthiazide (PRINZIDE, ZESTORETIC) 20-12.5 mg per tablet Take one tablet daily 90 Tab 3    omeprazole (PRILOSEC) 40 mg capsule Take 1 Cap by mouth daily. 30 Cap 1    amoxicillin (AMOXIL) 500 mg capsule TAKE 1 CAPSULE BY MOUTH THREE TIMES DAILY FOR 10 DAYS       Current Facility-Administered Medications   Medication Dose Route Frequency Provider Last Rate Last Admin    [COMPLETED] triamcinolone acetonide (KENALOG-40) 40 mg/mL injection 40 mg  40 mg Other ONCE Timothy Pimentel MD   40 mg at 21 1700    [COMPLETED] lidocaine (XYLOCAINE) 10 mg/mL (1 %) injection 1 mL  1 mL Other ONCE Timothy Pimentel MD   1 mL at 21 1700      Patient Active Problem List   Diagnosis Code    [facility] D69.[facility]    Essential hypertension I10    Panic attack F41.0    Bariatric surgery status Z98.84    Abnormal laboratory test R89.9    Elevated blood sugar R73.9    Fitting and adjustment of gastric lap band Z46.51    GERD (gastroesophageal reflux disease) K21.9    Obesity (BMI 30-39. 9) E66.9    Osteoarthrosis, unspecified whether generalized or localized, unspecified site M19.90    LAP-BAND surgery status Z98.84      Family History   Problem Relation Age of Onset    Heart Disease Father       Social History     Socioeconomic History    Marital status:      Spouse name: Not on file    Number of children: Not on file    Years of education: Not on file    Highest education level: Not on file   Tobacco Use    Smoking status: Never Smoker    Smokeless tobacco: Never Used   Substance and Sexual Activity    Alcohol use: Yes     Comment: occationally    Drug use: Never     Social Determinants of Health     Financial Resource Strain:     Difficulty of Paying Living Expenses:    Food Insecurity:     Worried About Running Out of Food in the Last Year:     [address] in the Last Year:    Transportation Needs:     Lack of Transportation (Medical):  Lack of Transportation (Non-Medical):    Physical Activity:     Days of Exercise per Week:     Minutes of Exercise per Session:    Stress:     Feeling of Stress :    Social Connections:     Frequency of Communication with Friends and Family:     Frequency of Social Gatherings with Friends and Family:     Attends [employer]ism Services:     Active Member of Clubs or Organizations:     Attends Club or Organization Meetings:     Marital Status:       Past Surgical History:   Procedure Laterality Date    HX KNEE REPLACEMENT Right 03/01/2020    HX TUBAL LIGATION        Past Medical History:   Diagnosis Date    Hypertension         I have reviewed and agree with [address] [place] [address] Nw and ROS and intake form in chart and the record furthermore I have reviewed prior medical record(s) regarding this patients care during this appointment. Review of Systems:   Patient is a pleasant appearing individual, appropriately dressed, well hydrated, well nourished, who is alert, appropriately oriented for age, and in no acute distress with a normal gait and normal affect who does not appear to be in any significant pain. Physical Exam:  Right Ankle- Grossly neurovascularly intact with good cap refill, decreased range of motion, decreased strength, positive crepitation, minimal swelling, skin intact with no skin lesions, no erythema, no echymosis, no point tenderness.      Left Ankle - No point tenderness, Full range of motion, No instability, No Weakness, No, skin lesions, No swelling, No instability, Grossly neurovascularly intact. Procedure Documentation:    I discussed in detail the risks, benefits and complications of an injection which included but are not limited to infection, skin reactions, hot swollen joint, and anaphylaxis with the patient. The patient verbalized understanding and gave informed consent for the injection. The patient's right ankle was prepped using sterile alcohol solution. A sterile needle was inserted into the right ankle and the mixture of 1 mL Lidocaine 1%, 1 mL Kenalog 40 mg was injected under sterile technique. The needle was withdrawn and the puncture site sealed with a Band-Aid. Technique: Under sterile conditions a [employer] ultrasound unit with a variable frequency (7.0-14.0 MHz) linear transducer was used to localize the placement of needle into the right ankle joint. Findings: Successful needle placement for ankle injection. Final images were taken and saved for permanent record. The patient tolerated the injection well. The patient was instructed to call the office immediately if there is any pain, redness, warmth, fever, or chills. Encounter Diagnoses     ICD-10-CM ICD-9-CM   1. Right ankle pain, unspecified chronicity  M25.571 719.47   2. Osteoarthritis of right ankle, unspecified osteoarthritis type  M19.071 715.97       HPI:  The patient is here with a chief complaint of right ankle pain, throbbing, burning pain, progressively getting worse. Pain is 7/10. X-rays of the right ankle are unremarkable. Assessment/Plan:  1. Right ankle arthritic flare. Plan will be for cortisone injection. See the patient back in 3 weeks. If no better, we will consider an MRI of the right ankle and go from there. As part of continued conservative pain management options the patient was advised to utilize Tylenol or OTC NSAIDS as long as it is not medically contraindicated. Return to Office:    Follow-up and Dispositions    · Return in about 3 weeks (around 10/7/2021). Administrations This Visit     lidocaine (XYLOCAINE) 10 mg/mL (1 %) injection 1 mL     Admin Date  09/16/2021 Action  Given Dose  1 mL Route  Other Administered By  Mary Alice Patel LPN          triamcinolone acetonide (KENALOG-40) 40 mg/mL injection 40 mg     Admin Date  09/16/2021 Action  Given Dose  40 mg Route  Other Administered By  Mary Alice Patel LPN               Scribed by Flor Boles LPN as dictated by [facility] NELL Corea MD.  Documentation True and Accepted Timothy Corea MD

## 2021-09-16 NOTE — PATIENT INSTRUCTIONS
Ankle: Exercises  Introduction  Here are some examples of exercises for you to try. The exercises may be suggested for a condition or for rehabilitation. Start each exercise slowly. Ease off the exercises if you start to have pain. You will be told when to start these exercises and which ones will work best for you. How to do the exercises  'Alphabet' exercise    1. Trace the alphabet with your toe. This helps your ankle move in all directions. Side-to-side knee swing exercise    1. Sit in a chair with your foot flat on the floor. 2. Slowly move your knee from side to side while keeping your foot pressed flat. 3. Continue this exercise for 2 to 3 minutes. Towel curl    1. While sitting, place your foot on a towel on the floor and scrunch the towel toward you with your toes. 2. Then use your toes to push the towel away from you. 3. Make this exercise more challenging by placing a weighted object, such as a soup can, on the other end of the towel. Towel stretch    1. Sit with your legs extended and knees straight. 2. Place a towel around your foot just under the toes. 3. Hold each end of the towel in each hand, with your hands above your knees. 4. Pull back with the towel so that your foot stretches toward you. 5. Hold the position for at least 15 to 30 seconds. 6. Repeat 2 to 4 times a session, up to 5 sessions a day. Ankle eversion exercise    1. Start by sitting with your foot flat on the floor and pushing it outward against an immovable object such as the wall or heavy furniture. Hold for about 6 seconds, then relax. Repeat 8 to 12 times. 2. After you feel comfortable with this, try using rubber tubing looped around the outside of your feet for resistance. Push your foot out to the side against the tubing, and then count to 10 as you slowly bring your foot back to the middle. Repeat 8 to 12 times. Isometric opposition exercises    1.  While sitting, put your feet together flat on the floor.  2. Press your injured foot inward against your other foot. Hold for about 6 seconds, and relax. Repeat 8 to 12 times. 3. Then place the heel of your other foot on top of the injured one. Push down with the top heel while trying to push up with your injured foot. Hold for about 6 seconds, and relax. Repeat 8 to 12 times. Follow-up care is a key part of your treatment and safety. Be sure to make and go to all appointments, and call your doctor if you are having problems. It's also a good idea to know your test results and keep a list of the medicines you take. Where can you learn more? Go to http://www.gray.com/  Enter R730 in the search box to learn more about \"Ankle: Exercises. \"  Current as of: July 1, 2021               Content Version: 13.0  © 6065-1225 [employer]. Care instructions adapted under license by [employer] (which disclaims liability or warranty for this information). If you have questions about a medical condition or this instruction, always ask your healthcare professional. [address] any warranty or liability for your use of this information.

## 2021-09-16 NOTE — LETTER
9/17/2021    Patient: Romana Pae   YOB: 1960   Date of Visit: 9/16/2021     Jennifer Alatorre MD  [facility]    Dear Jennifer Alatorre MD,      Thank you for referring Ms. Nette Moyer to [address] [place] [address] AND [facility] for evaluation. My notes for this consultation are attached. If you have questions, please do not hesitate to call me. I look forward to following your patient along with you.       Sincerely,    Shabbir Wyatt MD

## 2021-09-16 NOTE — LETTER
Ileana Cost   1960   [id number]       9/16/2021       I hereby authorize and direct Timothy Womack MD, Chris Hector, and whomever he may designate as his associate to perform upon myself the following procedure:    Injection of: Kenalog, Supartz, Euflexxa, Orthovisc in the Right/Left ____________________. If any unforeseen condition arises in the course of the procedure, I further authorize him and his associated and/or assistant(s) to do whatever he/she deems advisable. The nature, purpose, benefits, risks, side effects, likelihood of achieving goals, and potential problems that might occur during recuperation, risks for not receiving the proposed care, treatment and services and alternatives of the procedure have been fully explained to me by my physician including, but not limited to:    Swelling, joint pain, skin pigment changes, worsening of condition, and failure to improve. I acknowledge that no guarantee or assurance has been made to me as to the results that may be obtained or the likelihood of success.                 _______________________________________     Signature of patient or authorized representative                [employer] [facility] fax: [phone]

## 2021-11-23 ENCOUNTER — OFFICE VISIT (OUTPATIENT)
Dept: ORTHOPEDIC SURGERY | Age: 61
End: 2021-11-23
Payer: COMMERCIAL

## 2021-11-23 VITALS — HEIGHT: 63 IN | BODY MASS INDEX: 35.26 KG/M2 | WEIGHT: 199 LBS

## 2021-11-23 DIAGNOSIS — M17.12 OSTEOARTHRITIS OF LEFT KNEE, UNSPECIFIED OSTEOARTHRITIS TYPE: Primary | ICD-10-CM

## 2021-11-23 DIAGNOSIS — M25.562 LEFT KNEE PAIN, UNSPECIFIED CHRONICITY: ICD-10-CM

## 2021-11-23 PROBLEM — G45.9 TIA (TRANSIENT ISCHEMIC ATTACK): Status: ACTIVE | Noted: 2021-10-14

## 2021-11-23 PROCEDURE — 20611 DRAIN/INJ JOINT/BURSA W/US: CPT | Performed by: ORTHOPAEDIC SURGERY

## 2021-11-23 PROCEDURE — 99214 OFFICE O/P EST MOD 30 MIN: CPT | Performed by: ORTHOPAEDIC SURGERY

## 2021-11-23 RX ORDER — LIDOCAINE HYDROCHLORIDE 10 MG/ML
9 INJECTION INFILTRATION; PERINEURAL ONCE
Status: COMPLETED | OUTPATIENT
Start: 2021-11-23 | End: 2021-11-23

## 2021-11-23 RX ORDER — TRIAMCINOLONE ACETONIDE 40 MG/ML
40 INJECTION, SUSPENSION INTRA-ARTICULAR; INTRAMUSCULAR ONCE
Status: COMPLETED | OUTPATIENT
Start: 2021-11-23 | End: 2021-11-23

## 2021-11-23 RX ORDER — BUTALBITAL, ACETAMINOPHEN AND CAFFEINE 50; 325; 40 MG/1; MG/1; MG/1
TABLET ORAL
COMMUNITY
Start: 2021-10-15 | End: 2021-12-16 | Stop reason: SDUPTHER

## 2021-11-23 RX ADMIN — LIDOCAINE HYDROCHLORIDE 9 ML: 10 INJECTION INFILTRATION; PERINEURAL at 13:48

## 2021-11-23 RX ADMIN — TRIAMCINOLONE ACETONIDE 40 MG: 40 INJECTION, SUSPENSION INTRA-ARTICULAR; INTRAMUSCULAR at 13:48

## 2021-11-23 NOTE — Clinical Note
11/24/2021    Patient: Geni Wills   YOB: 1960   Date of Visit: 11/23/2021     Henny Frausto MD  [facility]     Dear Henny Frausto MD,      Thank you for referring Ms. Miki Mix to [address] [place] [address] [facility] for evaluation. My notes for this consultation are attached. If you have questions, please do not hesitate to call me. I look forward to following your patient along with you.       Sincerely,    Dea Noel MD

## 2021-11-23 NOTE — PROGRESS NOTES
Name: Luan Mcdaniel    : 1960     Service Dept: [address] [place] [address] and Sports Medicine    Chief Complaint   Patient presents with    Knee Pain     Left        Visit Vitals  Ht 5' 3\" (1.6 m)   Wt 199 lb (90.3 kg)   BMI 35.25 kg/m²        No Known Allergies     Current Outpatient Medications   Medication Sig Dispense Refill    butalbital-acetaminophen-caffeine (FIORICET, ESGIC) -40 mg per tablet       lisinopril-hydroCHLOROthiazide (PRINZIDE, ZESTORETIC) 20-12.5 mg per tablet Take one tablet daily 90 Tab 3    omeprazole (PRILOSEC) 40 mg capsule Take 1 Cap by mouth daily. 30 Cap 1    amoxicillin (AMOXIL) 500 mg capsule TAKE 1 CAPSULE BY MOUTH THREE TIMES DAILY FOR 10 DAYS        Patient Active Problem List   Diagnosis Code    Purpura ([facility] [address] D69.2    Essential hypertension I10    Panic attack F41.0    Bariatric surgery status Z98.84    Abnormal laboratory test R89.9    Elevated blood sugar R73.9    Fitting and adjustment of gastric lap band Z46.51    GERD (gastroesophageal reflux disease) K21.9    Obesity (BMI 30-39. 9) E66.9    Osteoarthrosis, unspecified whether generalized or localized, unspecified site M19.90    LAP-BAND surgery status Z98.84    TIA (transient ischemic attack) G45.9      Family History   Problem Relation Age of Onset    Heart Disease Father       Social History     Socioeconomic History    Marital status:    Tobacco Use    Smoking status: Never Smoker    Smokeless tobacco: Never Used   Substance and Sexual Activity    Alcohol use: Yes     Comment: occationally    Drug use: Never      Past Surgical History:   Procedure Laterality Date    HX KNEE REPLACEMENT Right 2020    HX TUBAL LIGATION        Past Medical History:   Diagnosis Date    Hypertension         I have reviewed and agree with [facility] and ROS and intake form in chart and the record furthermore I have reviewed prior medical record(s) regarding this patients care during this appointment. Review of Systems:   Patient is a pleasant appearing individual, appropriately dressed, well hydrated, well nourished, who is alert, appropriately oriented for age, and in no acute distress with a normal gait and normal affect who does not appear to be in any significant pain. Physical Exam:  Left Knee -Decrease range of motion with flexion, Knee arc of greater than 50 degrees, Some crepitation, Grossly neurovascularly intact, Good cap refill, No skin lesion, Moderate swelling, No gross instability, Some quadriceps weakness, Kellgren and Maykel at least grade 3    Right Knee - Full Range of Motion, No crepitation, Grossly neurovascularly intact, Good cap refill, No skin lesion, No swelling, No gross instability, No quadriceps weakness    Procedure Documentation:    I discussed in detail the risks, benefits and complications of an injection which included but are not limited to infection, skin reactions, hot swollen joint, and anaphylaxis with the patient. The patient verbalized understanding and gave informed consent for the injection. The patient's knee was flexed to 90° and the skin prepped using sterile alcohol solution. A sterile needle was inserted into the right knee and the mixture of 9 mL Lidocaine 1%, 1 mL Kenalog 40 mg was injected under sterile technique. The needle was withdrawn and the puncture site sealed with a Band-Aid. Technique: Under sterile conditions a [employer] ultrasound unit with a variable frequency (7.0-14.0 MHz) linear transducer was used to localize the placement of needle into the right knee joint. Findings: Successful needle placement for knee injection. Final images were taken and saved for permanent record. The patient tolerated the injection well. The patient was instructed to call the office immediately if there is any pain, redness, warmth, fever, or chills. Encounter Diagnoses     ICD-10-CM ICD-9-CM   1.  Osteoarthritis of left knee, unspecified osteoarthritis type  M17.12 715.96   2. Left knee pain, unspecified chronicity  M25.562 719.46       HPI:  The patient is here with a chief complaint of left knee pain, sharp, throbbing pain, progressively getting worse. Pain is 6/10. Diagnosed with osteoarthritis. Assessment/Plan:  Plan will be for cortisone injection to the left knee. We will see her back in about a week for routine followup. If no better, we will consider other options and go from there. As part of continued conservative pain management options the patient was advised to utilize Tylenol or OTC NSAIDS as long as it is not medically contraindicated. Return to Office: Follow-up and Dispositions    · Return in about 1 week (around 11/30/2021). Administrations This Visit     lidocaine (XYLOCAINE) 10 mg/mL (1 %) injection 9 mL     Admin Date  11/23/2021 Action  Given Dose  9 mL Route  Other Administered By  Juliet Pandey          triamcinolone acetonide (KENALOG-40) 40 mg/mL injection 40 mg     Admin Date  11/23/2021 Action  Given Dose  40 mg Route  Intra artICUlar Administered By  Juliet Pandey               Scribed by Sumeet Berman as dictated by Jaqui Alvarado. Gurinder Reich MD.  Documentation True and Accepted Timothy Reich MD

## 2021-11-23 NOTE — LETTER
Seema Kailyn   1960   [id number]       11/23/2021       I hereby authorize and direct Timotyh Scott MD, Ana María Champion, and whomever he may designate as his associate to perform upon myself the following procedure:    Injection of: LT KNEE CORTISONE    If any unforeseen condition arises in the course of the procedure, I further authorize him and his associated and/or assistant(s) to do whatever he/she deems advisable. The nature, purpose, benefits, risks, side effects, likelihood of achieving goals, and potential problems that might occur during recuperation, risks for not receiving the proposed care, treatment and services and alternatives of the procedure have been fully explained to me by my physician including, but not limited to:    Swelling, joint pain, skin pigment changes, worsening of condition, and failure to improve. I acknowledge that no guarantee or assurance has been made to me as to the results that may be obtained or the likelihood of success.                 _______________________________________     Signature of patient or authorized representative                [employer] [facility] fax: [phone]

## 2021-11-23 NOTE — PATIENT INSTRUCTIONS
Knee Arthritis: Care Instructions  Your Care Instructions     Knee arthritis is a breakdown of the cartilage that cushions your knee joint. When the cartilage wears down, your bones rub against each other. This causes pain and stiffness. Knee arthritis tends to get worse with time. Treatment for knee arthritis involves reducing pain, making the leg muscles stronger, and staying at a healthy body weight. The treatment usually does not improve the health of the cartilage, but it can reduce pain and improve how well your knee works. You can take simple measures to protect your knee joints, ease your pain, and help you stay active. Follow-up care is a key part of your treatment and safety. Be sure to make and go to all appointments, and call your doctor if you are having problems. It's also a good idea to know your test results and keep a list of the medicines you take. How can you care for yourself at home? · Know that knee arthritis will cause more pain on some days than on others. · Stay at a healthy weight. Lose weight if you are overweight. When you stand up, the pressure on your knees from every pound of body weight is multiplied four times. So if you lose 10 pounds, you will reduce the pressure on your knees by 40 pounds. · Talk to your doctor or physical therapist about exercises that will help ease joint pain. ? Stretch to help prevent stiffness and to prevent injury before you exercise. You may enjoy gentle forms of yoga to help keep your knee joints and muscles flexible. ? Walk instead of jog.  ? Ride a bike. This makes your thigh muscles stronger and takes pressure off your knee. ? Wear well-fitting and comfortable shoes. ? Exercise in chest-deep water. This can help you exercise longer with less pain. ? Avoid exercises that include squatting or kneeling. They can put a lot of strain on your knees.   ? Talk to your doctor to make sure that the exercise you do is not making the arthritis worse.  · Do not sit for long periods of time. Try to walk once in a while to keep your knee from getting stiff. · Ask your doctor or physical therapist whether shoe inserts may reduce your arthritis pain. · If you can afford it, get new athletic shoes at least every year. This can help reduce the strain on your knees. · Use a device to help you do everyday activities. ? A cane or walking stick can help you keep your balance when you walk. Hold the cane or walking stick in the hand opposite the painful knee. ? If you feel like you may fall when you walk, try using crutches or a front-wheeled walker. These can prevent falls that could cause more damage to your knee. ? A knee brace may help keep your knee stable and prevent pain. ? You also can use other things to make life easier, such as a higher toilet seat and handrails in the bathtub or shower. · Take pain medicines exactly as directed. ? Do not wait until you are in severe pain. You will get better results if you take it sooner. ? If you are not taking a prescription pain medicine, take an over-the-counter medicine such as acetaminophen (Tylenol), ibuprofen (Advil, Motrin), or naproxen (Aleve). Read and follow all instructions on the label. ? Do not take two or more pain medicines at the same time unless the doctor told you to. Many pain medicines have acetaminophen, which is Tylenol. Too much acetaminophen (Tylenol) can be harmful. ? Tell your doctor if you take a blood thinner, have diabetes, or have allergies to shellfish. · Ask your doctor if you might benefit from a shot of steroid medicine into your knee. This may provide pain relief for several months. · Many people take the supplements glucosamine and chondroitin for osteoarthritis. Some people feel they help, but the medical research does not show that they work. Talk to your doctor before you take these supplements. When should you call for help?    Call your doctor now or seek immediate medical care if:    · You have sudden swelling, warmth, or pain in your knee.     · You have knee pain and a fever or rash.     · You have such bad pain that you cannot use your knee. Watch closely for changes in your health, and be sure to contact your doctor if you have any problems. Where can you learn more? Go to http://www.gray.com/  Enter W187 in the search box to learn more about \"Knee Arthritis: Care Instructions. \"  Current as of: April 30, 2021               Content Version: 13.0  © 5154-8708 [employer]. Care instructions adapted under license by [employer] (which disclaims liability or warranty for this information). If you have questions about a medical condition or this instruction, always ask your healthcare professional. [address] any warranty or liability for your use of this information.

## 2021-12-07 LAB — MAMMOGRAPHY, EXTERNAL: NORMAL

## 2021-12-16 ENCOUNTER — OFFICE VISIT (OUTPATIENT)
Dept: FAMILY MEDICINE CLINIC | Age: 61
End: 2021-12-16
Payer: COMMERCIAL

## 2021-12-16 ENCOUNTER — TELEPHONE (OUTPATIENT)
Dept: FAMILY MEDICINE CLINIC | Age: 61
End: 2021-12-16

## 2021-12-16 VITALS
OXYGEN SATURATION: 97 % | WEIGHT: 208.6 LBS | SYSTOLIC BLOOD PRESSURE: 118 MMHG | BODY MASS INDEX: 36.96 KG/M2 | HEIGHT: 63 IN | TEMPERATURE: 98.2 F | HEART RATE: 80 BPM | DIASTOLIC BLOOD PRESSURE: 75 MMHG

## 2021-12-16 DIAGNOSIS — F43.20 ADJUSTMENT DISORDER, UNSPECIFIED TYPE: Primary | ICD-10-CM

## 2021-12-16 DIAGNOSIS — G43.009 MIGRAINE WITHOUT AURA AND WITHOUT STATUS MIGRAINOSUS, NOT INTRACTABLE: ICD-10-CM

## 2021-12-16 DIAGNOSIS — F51.01 PRIMARY INSOMNIA: ICD-10-CM

## 2021-12-16 DIAGNOSIS — Z13.31 POSITIVE DEPRESSION SCREENING: ICD-10-CM

## 2021-12-16 DIAGNOSIS — I10 ESSENTIAL HYPERTENSION: ICD-10-CM

## 2021-12-16 PROCEDURE — 99214 OFFICE O/P EST MOD 30 MIN: CPT | Performed by: NURSE PRACTITIONER

## 2021-12-16 RX ORDER — LISINOPRIL AND HYDROCHLOROTHIAZIDE 12.5; 2 MG/1; MG/1
TABLET ORAL
Qty: 90 TABLET | Refills: 3 | Status: CANCELLED | OUTPATIENT
Start: 2021-12-16

## 2021-12-16 RX ORDER — SERTRALINE HYDROCHLORIDE 25 MG/1
25 TABLET, FILM COATED ORAL DAILY
Qty: 90 TABLET | Refills: 1 | Status: SHIPPED | OUTPATIENT
Start: 2021-12-16 | End: 2021-12-16 | Stop reason: ALTCHOICE

## 2021-12-16 RX ORDER — BUTALBITAL, ACETAMINOPHEN AND CAFFEINE 50; 325; 40 MG/1; MG/1; MG/1
1 TABLET ORAL
Qty: 30 TABLET | Refills: 1 | Status: SHIPPED | OUTPATIENT
Start: 2021-12-16 | End: 2022-07-02

## 2021-12-16 RX ORDER — DOXEPIN HYDROCHLORIDE 25 MG/1
25 CAPSULE ORAL
Qty: 30 CAPSULE | Refills: 1 | Status: SHIPPED | OUTPATIENT
Start: 2021-12-16 | End: 2022-01-27 | Stop reason: SDUPTHER

## 2021-12-16 RX ORDER — SERTRALINE HYDROCHLORIDE 50 MG/1
25 TABLET, FILM COATED ORAL DAILY
Qty: 15 TABLET | Refills: 0 | Status: SHIPPED | OUTPATIENT
Start: 2021-12-16 | End: 2021-12-16 | Stop reason: ALTCHOICE

## 2021-12-16 RX ORDER — LISINOPRIL AND HYDROCHLOROTHIAZIDE 12.5; 2 MG/1; MG/1
TABLET ORAL
Qty: 90 TABLET | Refills: 3 | Status: SHIPPED | OUTPATIENT
Start: 2021-12-16 | End: 2022-05-04 | Stop reason: SDUPTHER

## 2021-12-16 NOTE — TELEPHONE ENCOUNTER
----- Message from [facility] sent at 12/16/2021 11:20 AM EST -----  Subject: Message to Provider    QUESTIONS  Information for Provider? Stacy Sneed from [address] [facility] is calling for pt   medication drug interaction. Doxepin and Zoloft. Stacy Sneed can be reached at   [phone]  ---------------------------------------------------------------------------  --------------  CALL BACK INFO  What is the best way for the office to contact you? OK to leave message on   voicemail  Preferred Call Back Phone Number? [phone]  ---------------------------------------------------------------------------  --------------  SCRIPT ANSWERS  Relationship to Patient? Third Party  Representative Name?  Blake

## 2021-12-16 NOTE — PROGRESS NOTES
Geni Wills presents today for   Chief Complaint   Patient presents with    Medication Refill     former pt of [facility]        Is someone accompanying this pt? No     Is the patient using any DME equipment during OV? No     Depression Screening:  3 most recent PHQ Screens 12/16/2021   Little interest or pleasure in doing things More than half the days   Feeling down, depressed, irritable, or hopeless Nearly every day   Total Score PHQ 2 5   Trouble falling or staying asleep, or sleeping too much Nearly every day   Feeling tired or having little energy Nearly every day   Poor appetite, weight loss, or overeating Not at all   Feeling bad about yourself - or that you are a failure or have let yourself or your family down Not at all   Trouble concentrating on things such as school, work, reading, or watching TV Not at all   Moving or speaking so slowly that other people could have noticed; or the opposite being so fidgety that others notice Several days   Thoughts of being better off dead, or hurting yourself in some way Not at all   PHQ 9 Score 12   How difficult have these problems made it for you to do your work, take care of your home and get along with others Not difficult at all       Learning Assessment:  Learning Assessment 9/8/2020   PRIMARY LEARNER Patient   HIGHEST LEVEL OF EDUCATION - PRIMARY LEARNER  SOME COLLEGE   BARRIERS PRIMARY LEARNER NONE   PRIMARY LANGUAGE ENGLISH   LEARNER PREFERENCE PRIMARY LISTENING   ANSWERED BY patient   RELATIONSHIP SELF       Fall Risk  Fall Risk Assessment, last 12 mths 11/24/2020   Able to walk? Yes   Fall in past 12 months? No       Health Maintenance reviewed and discussed and ordered per Provider.     Health Maintenance Due   Topic Date Due    Hepatitis C Screening  Never done    COVID-19 Vaccine (1) Never done    DTaP/Tdap/Td series (1 - Tdap) Never done    Cervical cancer screen  Never done    Colorectal Cancer Screening Combo  Never done    Shingrix Vaccine Age 50> (1 of 2) Never done    Flu Vaccine (1) 09/01/2021   . Coordination of Care:  1. Have you been to the ER, urgent care clinic since your last visit? Hospitalized since your last visit? No     2. Have you seen or consulted any other health care providers outside of the [address] [place] [address] since your last visit? Include any pap smears or colon screening.  No

## 2021-12-16 NOTE — PROGRESS NOTES
History of Present Illness  Kathie Medley is a 59 y.o. female who presents today for:    Chief Complaint   Patient presents with    Medication Refill     former pt of ayesha      Past Medical History  Past Medical History:   Diagnosis Date    Hypertension         Surgical History  Past Surgical History:   Procedure Laterality Date    HX KNEE REPLACEMENT Right 03/01/2020    HX TUBAL LIGATION          Current Medications  Current Outpatient Medications   Medication Sig    butalbital-acetaminophen-caffeine (FIORICET, ESGIC) -40 mg per tablet     lisinopril-hydroCHLOROthiazide (PRINZIDE, ZESTORETIC) 20-12.5 mg per tablet Take one tablet daily    omeprazole (PRILOSEC) 40 mg capsule Take 1 Cap by mouth daily. (Patient not taking: Reported on 12/16/2021)    amoxicillin (AMOXIL) 500 mg capsule TAKE 1 CAPSULE BY MOUTH THREE TIMES DAILY FOR 10 DAYS (Patient not taking: Reported on 12/16/2021)     No current facility-administered medications for this visit.        Allergies/Drug Reactions  No Known Allergies     Family History  Family History   Problem Relation Age of Onset    Heart Disease Father         Social History  Social History     Tobacco Use    Smoking status: Never Smoker    Smokeless tobacco: Never Used   Substance Use Topics    Alcohol use: Yes     Comment: occationally    Drug use: Never        Health Maintenance   Topic Date Due    Hepatitis C Screening  Never done    DTaP/Tdap/Td series (1 - Tdap) Never done    Cervical cancer screen  Never done    Colorectal Cancer Screening Combo  Never done    Shingrix Vaccine Age 50> (1 of 2) Never done    Flu Vaccine (1) 09/01/2021    Breast Cancer Screen Mammogram  05/07/2023    Lipid Screen  10/15/2026    COVID-19 Vaccine  Completed    Pneumococcal 0-64 years  Aged [employer] History   Administered Date(s) Administered    COVID-19, Zoe Dues, Primary or Immunocompromised Series, MRNA, PF, 100mcg/0.5mL 01/12/2021, 02/09/2021, 12/15/2021     Physical Exam  Vital signs:   Vitals:    21 0850   BP: 118/75   Pulse: 80   Temp: 98.2 °F (36.8 °C)   TempSrc: Oral   SpO2: 97%   Weight: 208 lb 9.6 oz (94.6 kg)   Height: 5' 3\" (1.6 m)     General: alert, oriented, not in distress  Head: scalp normal, atraumatic  Eyes: pupils are equal and reactive, full and intact EOM's  Ears: patent ear canal, intact tympanic membrane  Nose: normal turbinates, no congestion or discharge  Lips/Mouth: moist lips and buccal mucosa, non-enlarged tonsils, pink throat  Neck: supple, no JVD, no lymphadenopathy, non-palpable thyroid  Chest/Lungs: clear breath sounds, no wheezing or crackles  Heart: normal rate, regular rhythm, no murmur  Abdomen: soft, non-distended, non-tender, normal bowel sounds, no organomegaly, no masses  Extremities: no focal deformities, no edema  Skin: no active skin lesions    Laboratory/Tests:  No visits with results within 3 Month(s) from this visit. Latest known visit with results is:   Hospital Outpatient Visit on 2021   Component Date Value Ref Range Status    D DIMER 2021 1.17* <0.46 ug/ml(FEU) Final    A D-Dimer result less than 0.5 ug/mL FEU combined with a low clinical pretest probability of DVT and/or PE has a negative predictive value of %. The positive predictive value is 50% or less. Patient reports her   approximately 2-weeks ago after diagnosis with stage 4 cancer. Patient reports she received COVID Booster on 12/15/2021 Moderna. Patient reports she works at [employer], [employer]. She reports she is looking forward to returning to work. Physical examination performed:  Bilateral ear tympanic membrane visualized on otoscopic examination revealed no abnormalities. Bilateral lung sounds clear to auscultation in all fields. Cardiac auscultation revealed no arrhythmias or murmurs. Abdomen soft and nontender, bowel sounds normoactive in all 4 quadrants.   There is no observed bilateral lower extremity edema. Patient reports she was previously prescribed antidepressant for adjustment disorder after the death of her son some years ago. She also reports some difficulty sleeping at night since the death of her . Will prescribe Doxepin 25 mg capsule nightly for adjustment disorder due to the death of her  and primary insomnia. Patient reports she has a lot to look forward to with 3year-old grandchild and daughter which live very close as well as her own mother and father which are doing very well and are in close proximity. Assessment/Plan:    1. Adjustment disorder, Positive PHQ-9 and primary insomnia. Prescribed Doxepin 25 mg daily for management of adjustment disorder, Positive PHQ-9 and primary insomnia. 2.  Essential hypertension. Continue Lisinopril/HCTZ 20/12.5 mg tablet daily for management of essential hypertension. 3.  Migraines. Continue Butalbital/Acetaminophen/Caffeine 50/325/40 milligrams tablet every 6 hours as needed for management of migraines. I have discussed the diagnosis with the patient and the intended plan as seen in the above orders. The patient has received an after-visit summary and questions were answered concerning future plans. I have discussed medication side effects and warnings with the patient as well. I have reviewed the plan of care with the patient, accepted their input and they are in agreement with the treatment goals. Gema Pimentel NP  December 16, 2021  Depression screen positive, PHQ 9 Score: 12, C-SSRS completed, patient instructed to schedule a follow-up visit at this practice and medication was prescribed, drug therapy education given - patient will call for any significant medication side effects or worsening symptoms of depression.

## 2022-01-27 ENCOUNTER — OFFICE VISIT (OUTPATIENT)
Dept: FAMILY MEDICINE CLINIC | Age: 62
End: 2022-01-27
Payer: COMMERCIAL

## 2022-01-27 VITALS — SYSTOLIC BLOOD PRESSURE: 116 MMHG | DIASTOLIC BLOOD PRESSURE: 74 MMHG | HEART RATE: 95 BPM | OXYGEN SATURATION: 96 %

## 2022-01-27 DIAGNOSIS — I10 ESSENTIAL HYPERTENSION: ICD-10-CM

## 2022-01-27 DIAGNOSIS — F51.01 PRIMARY INSOMNIA: ICD-10-CM

## 2022-01-27 DIAGNOSIS — G43.009 MIGRAINE WITHOUT AURA AND WITHOUT STATUS MIGRAINOSUS, NOT INTRACTABLE: ICD-10-CM

## 2022-01-27 DIAGNOSIS — Z13.31 POSITIVE DEPRESSION SCREENING: ICD-10-CM

## 2022-01-27 DIAGNOSIS — E66.9 OBESITY (BMI 30-39.9): Primary | ICD-10-CM

## 2022-01-27 DIAGNOSIS — F43.20 ADJUSTMENT DISORDER, UNSPECIFIED TYPE: ICD-10-CM

## 2022-01-27 PROCEDURE — 99214 OFFICE O/P EST MOD 30 MIN: CPT | Performed by: NURSE PRACTITIONER

## 2022-01-27 RX ORDER — LISINOPRIL AND HYDROCHLOROTHIAZIDE 12.5; 2 MG/1; MG/1
TABLET ORAL
Qty: 90 TABLET | Refills: 3 | Status: CANCELLED | OUTPATIENT
Start: 2022-01-27

## 2022-01-27 RX ORDER — DOXEPIN HYDROCHLORIDE 25 MG/1
25 CAPSULE ORAL
Qty: 60 CAPSULE | Refills: 1 | Status: SHIPPED | OUTPATIENT
Start: 2022-01-27 | End: 2022-04-28 | Stop reason: SDUPTHER

## 2022-01-27 RX ORDER — CARBOXYMETHYLCELLULOSE/CITRIC 0.75 G
3 CAPSULE ORAL
Qty: 84 CAPSULE | Refills: 2 | Status: SHIPPED
Start: 2022-01-27 | End: 2022-07-02

## 2022-01-27 NOTE — PROGRESS NOTES
Boom Brush presents today for   Chief Complaint   Patient presents with    Follow-up     1 month follow up        Is someone accompanying this pt? no    Is the patient using any DME equipment during [address]? no    Depression Screening:  3 most recent PHQ Screens 1/27/2022   Little interest or pleasure in doing things Several days   Feeling down, depressed, irritable, or hopeless Several days   Total Score PHQ 2 2   Trouble falling or staying asleep, or sleeping too much -   Feeling tired or having little energy -   Poor appetite, weight loss, or overeating -   Feeling bad about yourself - or that you are a failure or have let yourself or your family down -   Trouble concentrating on things such as school, work, reading, or watching TV -   Moving or speaking so slowly that other people could have noticed; or the opposite being so fidgety that others notice -   Thoughts of being better off dead, or hurting yourself in some way -   PHQ 9 Score -   How difficult have these problems made it for you to do your work, take care of your home and get along with others -       Learning Assessment:  Learning Assessment 9/8/2020   PRIMARY LEARNER Patient   HIGHEST LEVEL OF EDUCATION - PRIMARY LEARNER  SOME COLLEGE   BARRIERS PRIMARY LEARNER NONE   PRIMARY LANGUAGE ENGLISH   LEARNER PREFERENCE PRIMARY LISTENING   ANSWERED BY patient   RELATIONSHIP SELF       Fall Risk  Fall Risk Assessment, last 12 mths 11/24/2020   Able to walk? Yes   Fall in past 12 months?  No       ADL  ADL Assessment 11/24/2020   Feeding yourself No Help Needed   Getting from bed to chair No Help Needed   Getting dressed No Help Needed   Bathing or showering No Help Needed   Walk across the room (includes cane/walker) No Help Needed   Using the telphone No Help Needed   Taking your medications No Help Needed   Preparing meals No Help Needed   Managing money (expenses/bills) No Help Needed   Moderately strenuous housework (laundry) No Help Needed   Shopping for personal items (toiletries/medicines) No Help Needed   Shopping for groceries No Help Needed   Driving No Help Needed   Climbing a flight of stairs No Help Needed   Getting to places beyond walking distances No Help Needed       Travel Screening:    Travel Screening     Question   Response    In the last month, have you been in contact with someone who was confirmed or suspected to have Coronavirus / COVID-19? No / Unsure    Have you had a COVID-19 viral test in the last 14 days? No    Do you have any of the following new or worsening symptoms? None of these    Have you traveled internationally or domestically in the last month? No      Travel History   Travel since 12/27/21    No documented travel since 12/27/21         Health Maintenance reviewed and discussed and ordered per Provider. Health Maintenance Due   Topic Date Due    Hepatitis C Screening  Never done    DTaP/Tdap/Td series (1 - Tdap) Never done    Cervical cancer screen  Never done    Colorectal Cancer Screening Combo  Never done    Shingrix Vaccine Age 50> (1 of 2) Never done   . Coordination of Care:  1. \"Have you been to the ER, urgent care clinic since your last visit? Hospitalized since your last visit? \" No    2. \"Have you seen or consulted any other health care providers outside of the [address] [place] [address] since your last visit? \" No     3. For patients aged 39-70: Has the patient had a colonoscopy? Yes - no Care Gap present     If the patient is female:    4. For patients aged 41-77: Has the patient had a mammogram within the past 2 years? Yes - no Care Gap present    5. For patients aged 21-65: Has the patient had a pap smear?  Yes - no Care Gap present

## 2022-01-27 NOTE — PROGRESS NOTES
History of Present Illness  Hiram Merchant is a 59 y.o. female who presents today for:    Chief Complaint   Patient presents with    Follow-up     1 month follow up      Past Medical History  Past Medical History:   Diagnosis Date    Hypertension         Surgical History  Past Surgical History:   Procedure Laterality Date    HX KNEE REPLACEMENT Right 03/01/2020    HX TUBAL LIGATION          Current Medications  Current Outpatient Medications   Medication Sig    butalbital-acetaminophen-caffeine (FIORICET, ESGIC) -40 mg per tablet Take 1 Tablet by mouth every six (6) hours as needed for Headache.  lisinopril-hydroCHLOROthiazide (PRINZIDE, ZESTORETIC) 20-12.5 mg per tablet Take one tablet daily    doxepin (SINEquan) 25 mg capsule Take 1 Capsule by mouth nightly. No current facility-administered medications for this visit.        Allergies/Drug Reactions  No Known Allergies     Family History  Family History   Problem Relation Age of Onset    Heart Disease Father         Social History  Social History     Tobacco Use    Smoking status: Never Smoker    Smokeless tobacco: Never Used   Substance Use Topics    Alcohol use: Yes     Comment: occationally    Drug use: Never        Health Maintenance   Topic Date Due    Hepatitis C Screening  Never done    DTaP/Tdap/Td series (1 - Tdap) Never done    Cervical cancer screen  Never done    Colorectal Cancer Screening Combo  Never done    Shingrix Vaccine Age 50> (1 of 2) Never done    Breast Cancer Screen Mammogram  05/07/2023    Lipid Screen  10/15/2026    Flu Vaccine  Completed    COVID-19 Vaccine  Completed    Pneumococcal 0-64 years  Aged [employer] History   Administered Date(s) Administered    COVID-19, Verle Adriana, Primary or Immunocompromised Series, MRNA, PF, 100mcg/0.5mL 01/12/2021, 02/09/2021, 12/15/2021    Influenza Vaccine 11/10/2021     Physical Exam  Vital signs:   Vitals:    01/27/22 1628   BP: 116/74   Pulse: 95   SpO2: 96%     General: alert, oriented, not in distress  Head: scalp normal, atraumatic  Eyes: pupils are equal and reactive, full and intact EOM's  Ears: patent ear canal, intact tympanic membrane  Nose: normal turbinates, no congestion or discharge  Lips/Mouth: moist lips and buccal mucosa, non-enlarged tonsils, pink throat  Neck: supple, no JVD, no lymphadenopathy, non-palpable thyroid  Chest/Lungs: clear breath sounds, no wheezing or crackles  Heart: normal rate, regular rhythm, no murmur  Abdomen: soft, non-distended, non-tender, normal bowel sounds, no organomegaly, no masses  Extremities: no focal deformities, no edema  Skin: no active skin lesions    Laboratory/Tests:  No visits with results within 3 Month(s) from this visit. Latest known visit with results is:   Hospital Outpatient Visit on 01/18/2021   Component Date Value Ref Range Status    D DIMER 01/18/2021 1.17* <0.46 ug/ml(FEU) Final    A D-Dimer result less than 0.5 ug/mL FEU combined with a low clinical pretest probability of DVT and/or PE has a negative predictive value of %. The positive predictive value is 50% or less. Patient reports somewhat improved sleep with prescribed Sinequan 25 mg.  I have advised the patient she may increase Sinequan to 50 mg nightly to help manage her sleep if necessary. Patient reports she has returned to work and finds satisfaction with work routine. She reports she still has some days where she has difficulty with recent death of her , but her outlook has improved since she returned to work. Patient reports difficulty with weight loss. Patient reports history of lap band surgery for weight loss some years ago. She reports she dropped a significant amount of weight after procedure and has slowly regained most of the weight she lost after procedure. Will prescribe Plenity at this visit to assist with weight loss. Assessment/Plan:    1. Primary insomnia.   Continue Doxepin 25 mg tablet nightly for management of primary insomnia. 2.  Essential hypertension. Continue Lisinopril/HCTZ 20/12.5 mg tablet daily for management of essential hypertension. 3.  Obesity. Prescribed Carbomethylcellulose-citric (Plenity) 0.75 g tablet take 3 caplets twice daily before lunch and dinner for management of obesity. 4.  Migraines. Continue Butalbital/Acetaminophen/Caffeine 50/325/40 mg tablet every 6 hours as needed for management of migraines. I have discussed the diagnosis with the patient and the intended plan as seen in the above orders. The patient has received an after-visit summary and questions were answered concerning future plans. I have discussed medication side effects and warnings with the patient as well. I have reviewed the plan of care with the patient, accepted their input and they are in agreement with the treatment goals.        Senthil Langley NP  January 27, 2022

## 2022-01-31 DIAGNOSIS — E66.9 OBESITY (BMI 30-39.9): ICD-10-CM

## 2022-01-31 NOTE — TELEPHONE ENCOUNTER
Pharmacist calls [facility] to request a correction directions and quantity for plenity.

## 2022-03-18 PROBLEM — D69.2 PURPURA (HCC): Status: ACTIVE | Noted: 2020-11-24

## 2022-03-19 PROBLEM — G45.9 TIA (TRANSIENT ISCHEMIC ATTACK): Status: ACTIVE | Noted: 2021-10-14

## 2022-03-19 PROBLEM — I10 ESSENTIAL HYPERTENSION: Status: ACTIVE | Noted: 2020-11-24

## 2022-03-19 PROBLEM — R89.9 ABNORMAL LABORATORY TEST: Status: ACTIVE | Noted: 2021-01-18

## 2022-03-19 PROBLEM — Z98.84 BARIATRIC SURGERY STATUS: Status: ACTIVE | Noted: 2021-01-18

## 2022-03-20 PROBLEM — F41.0 PANIC ATTACK: Status: ACTIVE | Noted: 2021-01-18

## 2022-04-28 DIAGNOSIS — F51.01 PRIMARY INSOMNIA: ICD-10-CM

## 2022-04-28 DIAGNOSIS — F43.20 ADJUSTMENT DISORDER, UNSPECIFIED TYPE: ICD-10-CM

## 2022-04-28 DIAGNOSIS — Z13.31 POSITIVE DEPRESSION SCREENING: ICD-10-CM

## 2022-04-28 NOTE — TELEPHONE ENCOUNTER
Patient is asking for this to be increased to BID dosing. Please advise    Last visit:  1/27/22 with NP Jd Leavitt  Next appt:  6/15/22 with LILY Reynolds    Requested Prescriptions     Pending Prescriptions Disp Refills    doxepin (SINEquan) 25 mg capsule       Sig: Take 1 Capsule by mouth.

## 2022-04-28 NOTE — TELEPHONE ENCOUNTER
----- Message from Hossein Tucker sent at 4/27/2022  4:21 PM EDT -----  Subject: Refill Request    QUESTIONS  Name of Medication? doxepin (SINEquan) 25 mg capsule  Patient-reported dosage and instructions? 25mg twice daily  How many days do you have left? 10  Preferred Pharmacy? [facility] phone number (if available)? [phone]  Additional Information for Provider? pt needs dosage changed to twice   daily  ---------------------------------------------------------------------------  --------------  CALL BACK INFO  What is the best way for the office to contact you? OK to leave message on   voicemail  Preferred Call Back Phone Number? [phone]  ---------------------------------------------------------------------------  --------------  SCRIPT ANSWERS  Relationship to Patient?  Self

## 2022-04-29 RX ORDER — DOXEPIN HYDROCHLORIDE 25 MG/1
50 CAPSULE ORAL
Qty: 90 CAPSULE | Refills: 1 | Status: SHIPPED | OUTPATIENT
Start: 2022-04-29 | End: 2022-05-04 | Stop reason: SDUPTHER

## 2022-05-04 DIAGNOSIS — Z13.31 POSITIVE DEPRESSION SCREENING: ICD-10-CM

## 2022-05-04 DIAGNOSIS — F43.20 ADJUSTMENT DISORDER, UNSPECIFIED TYPE: ICD-10-CM

## 2022-05-04 DIAGNOSIS — I10 ESSENTIAL HYPERTENSION: ICD-10-CM

## 2022-05-04 DIAGNOSIS — F51.01 PRIMARY INSOMNIA: ICD-10-CM

## 2022-05-04 NOTE — TELEPHONE ENCOUNTER
Patient calls because she hasn't received Doxepin. In looking at script it went to [facility] and she says she doesn't use them. Needs to go to [facility]. Also needs Lisinopril-HCTZ sent as well.

## 2022-05-05 RX ORDER — DOXEPIN HYDROCHLORIDE 25 MG/1
50 CAPSULE ORAL
Qty: 90 CAPSULE | Refills: 1 | Status: SHIPPED | OUTPATIENT
Start: 2022-05-05 | End: 2022-10-28

## 2022-05-05 RX ORDER — LISINOPRIL AND HYDROCHLOROTHIAZIDE 12.5; 2 MG/1; MG/1
TABLET ORAL
Qty: 90 TABLET | Refills: 3 | Status: SHIPPED | OUTPATIENT
Start: 2022-05-05 | End: 2022-07-02

## 2022-06-14 ENCOUNTER — OFFICE VISIT (OUTPATIENT)
Dept: ORTHOPEDIC SURGERY | Age: 62
End: 2022-06-14
Payer: COMMERCIAL

## 2022-06-14 DIAGNOSIS — M79.671 RIGHT FOOT PAIN: ICD-10-CM

## 2022-06-14 DIAGNOSIS — M72.2 PLANTAR FASCIAL FIBROMATOSIS OF RIGHT FOOT: Primary | ICD-10-CM

## 2022-06-14 PROCEDURE — 20606 DRAIN/INJ JOINT/BURSA W/US: CPT | Performed by: ORTHOPAEDIC SURGERY

## 2022-06-14 PROCEDURE — 99213 OFFICE O/P EST LOW 20 MIN: CPT | Performed by: ORTHOPAEDIC SURGERY

## 2022-06-14 RX ORDER — LIDOCAINE HYDROCHLORIDE 10 MG/ML
1 INJECTION INFILTRATION; PERINEURAL ONCE
Status: COMPLETED | OUTPATIENT
Start: 2022-06-14 | End: 2022-06-14

## 2022-06-14 RX ORDER — TRIAMCINOLONE ACETONIDE 40 MG/ML
40 INJECTION, SUSPENSION INTRA-ARTICULAR; INTRAMUSCULAR ONCE
Status: COMPLETED | OUTPATIENT
Start: 2022-06-14 | End: 2022-06-14

## 2022-06-14 RX ADMIN — LIDOCAINE HYDROCHLORIDE 1 ML: 10 INJECTION INFILTRATION; PERINEURAL at 17:00

## 2022-06-14 RX ADMIN — TRIAMCINOLONE ACETONIDE 40 MG: 40 INJECTION, SUSPENSION INTRA-ARTICULAR; INTRAMUSCULAR at 17:00

## 2022-06-14 NOTE — LETTER
6/16/2022    Patient: Miki Trujillo   YOB: 1960   Date of Visit: 6/14/2022     Mathew Martinez NP  [address]    Dear Mathew Martinez NP,      Thank you for referring Ms. Yvette Gardner to [address] [place] [address] AND [facility] for evaluation. My notes for this consultation are attached. If you have questions, please do not hesitate to call me. I look forward to following your patient along with you.       Sincerely,    Evelin Alcantara MD

## 2022-06-14 NOTE — PROGRESS NOTES
Name: Brea Benedict    : 1960     Service Dept: [address] [place] [address] Sports Medicine    Chief Complaint   Patient presents with    Foot Pain        There were no vitals taken for this visit. No Known Allergies     Current Outpatient Medications   Medication Sig Dispense Refill    doxepin (SINEquan) 25 mg capsule Take 2 Capsules by mouth nightly. 90 Capsule 1    lisinopril-hydroCHLOROthiazide (PRINZIDE, ZESTORETIC) 20-12.5 mg per tablet Take one tablet daily 90 Tablet 3    carboxymethylcellulose-citric 0.75 gram cap Take 3 Capsules by mouth two (2) times a day. Take 3 capsules 20-30 minutes prior to lunch and prior to dinner 168 Capsule 2    carboxymethylcellulose-citric (Plenity) 0.75 gram cap Take 3 Each by mouth Before breakfast and dinner. 84 Capsule 2    butalbital-acetaminophen-caffeine (FIORICET, ESGIC) -40 mg per tablet Take 1 Tablet by mouth every six (6) hours as needed for Headache. 30 Tablet 1     Current Facility-Administered Medications   Medication Dose Route Frequency Provider Last Rate Last Admin    [COMPLETED] triamcinolone acetonide (KENALOG-40) 40 mg/mL injection 40 mg  40 mg Other ONCE Timothy Pimentel MD   40 mg at 22 1700    [COMPLETED] lidocaine (XYLOCAINE) 10 mg/mL (1 %) injection 1 mL  1 mL Other ONCE Timothy Pimentel MD   1 mL at 22 1700      Patient Active Problem List   Diagnosis Code    [facility] D[facility]    Essential hypertension I10    Panic attack F41.0    Bariatric surgery status Z98.84    Abnormal laboratory test R89.9    Elevated blood sugar R73.9    Fitting and adjustment of gastric lap band Z46.51    GERD (gastroesophageal reflux disease) K21.9    Obesity (BMI 30-39. 9) E66.9    Osteoarthrosis, unspecified whether generalized or localized, unspecified site M19.90    LAP-BAND surgery status Z98.84    TIA (transient ischemic attack) G45.9      Family History   Problem Relation Age of Onset    Heart Disease Father       Social History     Socioeconomic History    Marital status:    Tobacco Use    Smoking status: Never Smoker    Smokeless tobacco: Never Used   Substance and Sexual Activity    Alcohol use: Yes     Comment: occationally    Drug use: Never      Past Surgical History:   Procedure Laterality Date    HX KNEE REPLACEMENT Right 03/01/2020    HX TUBAL LIGATION        Past Medical History:   Diagnosis Date    Hypertension         I have reviewed and agree with [facility] and ROS and intake form in chart and the record furthermore I have reviewed prior medical record(s) regarding this patients care during this appointment. Review of Systems:   Patient is a pleasant appearing individual, appropriately dressed, well hydrated, well nourished, who is alert, appropriately oriented for age, and in no acute distress with a normal gait and normal affect who does not appear to be in any significant pain. Physical Exam:  Right foot- grossly neurovascularly intact, good cap refill, no swelling, no instability, full range of motion, positive point tenderness noted on the plantar aspect of the heel, skin is intact. Left foot-Grossly neurovascularly intact, good cap refill, full range of motion, no weakness, no swelling, no point tenderness, no skin lesions. Procedure Documentation:    I discussed in detail the risks, benefits and complications of an injection which included but are not limited to infection, skin reactions, hot swollen joint, and anaphylaxis with the patient. The patient verbalized understanding and gave informed consent for the injection. The patient's right foot was prepped using sterile alcohol solution. A sterile needle was inserted into the right foot and the mixture of 1 mL Lidocaine 1%, 1 mL Kenalog 40 mg was injected under sterile technique. The needle was withdrawn and the puncture site sealed with a Band-Aid.       Technique: Under sterile conditions a [employer] ultrasound unit with a variable frequency (7.0-14.0 MHz) linear transducer was used to localize the placement of needle into the right foot. Findings: Successful needle placement for foot injection. Final images were taken and saved for permanent record. The patient tolerated the injection well. The patient was instructed to call the office immediately if there is any pain, redness, warmth, fever, or chills. Encounter Diagnoses     ICD-10-CM ICD-9-CM   1. Plantar fascial fibromatosis of right foot  M72.2 728.71   2. Right foot pain  M79.671 729.5       HPI:  The patient is here with a chief complaint of right heel pain. Throbbing, burning pain. Progressively getting worse. Pain is 7/10. X-rays of the right heel are unremarkable in the past.    Assessment/Plan:  1. Right heel plantar fascitis. Plan will be for cortisone injection. If it helps, it is all we need to do and go from there. As part of continued conservative pain management options the patient was advised to utilize Tylenol or OTC NSAIDS as long as it is not medically contraindicated. Return to Office: Follow-up and Dispositions    · Return in about 1 week (around 6/21/2022). Administrations This Visit     lidocaine (XYLOCAINE) 10 mg/mL (1 %) injection 1 mL     Admin Date  06/14/2022 Action  Given Dose  1 mL Route  Other Administered By  Thierry Melton LPN          triamcinolone acetonide (KENALOG-40) 40 mg/mL injection 40 mg     Admin Date  06/14/2022 Action  Given Dose  40 mg Route  Other Administered By  Thierry Melton LPN               Scribed by Tania Mills LPN as dictated by [facility] Castle MD.  Documentation True and Accepted Timothy Castle MD

## 2022-06-14 NOTE — PATIENT INSTRUCTIONS
Plantar Fasciitis: Care Instructions  Overview     Plantar fasciitis is pain and inflammation of the plantar fascia, the tissue at the bottom of your foot that connects the heel bone to the toes. The plantar fascia also supports the arch. If you strain the plantar fascia, it can develop small tears and cause heel pain when you stand or walk. Plantar fasciitis can be caused by running or other sports. It also may occur in people who are overweight or who have high arches or flat feet. You may get plantar fasciitis if you walk or stand for long periods, or have a tight Achilles tendon or calf muscles. You can improve your foot pain with rest and other care at home. It might take a few weeks to a few months for your foot to heal completely. Follow-up care is a key part of your treatment and safety. Be sure to make and go to all appointments, and call your doctor if you are having problems. It's also a good idea to know your test results and keep a list of the medicines you take. How can you care for yourself at home? · Rest your feet often. Reduce your activity to a level that lets you avoid pain. If possible, do not run or walk on hard surfaces. · Take pain medicines exactly as directed. ? If the doctor gave you a prescription medicine for pain, take it as prescribed. ? If you are not taking a prescription pain medicine, take an over-the-counter anti-inflammatory medicine for pain and swelling, such as ibuprofen (Advil, Motrin) or naproxen (Aleve). Read and follow all instructions on the label. · Use ice massage to help with pain and swelling. You can use an ice cube or an ice cup several times a day. To make an ice cup, fill a paper cup with water and freeze it. Cut off the top of the cup until a half-inch of ice shows. Hold onto the remaining paper to use the cup. Rub the ice in small circles over the area for 5 to 7 minutes.   · Contrast baths, which alternate hot and cold water, can also help reduce swelling. But because heat alone may make pain and swelling worse, end a contrast bath with a soak in cold water. · Wear a night splint if your doctor suggests it. A night splint holds your foot with the toes pointed up and the foot and ankle at a 90-degree angle. This position gives the bottom of your foot a constant, gentle stretch. · Do simple exercises such as calf stretches and towel stretches 2 to 3 times each day, especially when you first get up in the morning. These can help the plantar fascia become more flexible. They also make the muscles that support your arch stronger. Hold these stretches for 15 to 30 seconds per stretch. Repeat 2 to 4 times. ? Stand about 1 foot from a wall. Place the palms of both hands against the wall at chest level. Lean forward against the wall, keeping one leg with the knee straight and heel on the ground while bending the knee of the other leg.  ? Sit down on the floor or a mat with your feet stretched in front of you. Roll up a towel lengthwise, and loop it over the ball of your foot. Holding the towel at both ends, gently pull the towel toward you to stretch your foot. · Wear shoes with good arch support. Athletic shoes or shoes with a well-cushioned sole are good choices. · Replace athletic shoes regularly. · Try heel cups or shoe inserts (orthotics) to help cushion your heel. You can buy these at many shoe stores. · Put on your shoes as soon as you get out of bed. Going barefoot or wearing slippers may make your pain worse. · Reach and stay at a good weight for your height. This puts less strain on your feet. When should you call for help? Call your doctor now or seek immediate medical care if:    · You have heel pain with fever, redness, or warmth in your heel.     · You cannot put weight on the sore foot.    Watch closely for changes in your health, and be sure to contact your doctor if:    · You have numbness or tingling in your heel.     · Your heel pain lasts more than 2 weeks. Where can you learn more? Go to http://www.gray.com/  Enter X351 in the search box to learn more about \"Plantar Fasciitis: Care Instructions. \"  Current as of: July 1, 2021               Content Version: 13.2  © 4305-7287 [employer]. Care instructions adapted under license by [employer] (which disclaims liability or warranty for this information). If you have questions about a medical condition or this instruction, always ask your healthcare professional. [address] any warranty or liability for your use of this information.

## 2022-06-14 NOTE — LETTER
Gerry Greene   1960   [id number]       6/14/2022       I hereby authorize and direct Timothy Ceja MD, Dana Riojas, and whomever he may designate as his associate to perform upon myself the following procedure:    Injection of: Kenalog, LT HEEL    If any unforeseen condition arises in the course of the procedure, I further authorize him and his associated and/or assistant(s) to do whatever he/she deems advisable. The nature, purpose, benefits, risks, side effects, likelihood of achieving goals, and potential problems that might occur during recuperation, risks for not receiving the proposed care, treatment and services and alternatives of the procedure have been fully explained to me by my physician including, but not limited to:    Swelling, joint pain, skin pigment changes, worsening of condition, and failure to improve. I acknowledge that no guarantee or assurance has been made to me as to the results that may be obtained or the likelihood of success.                 _______________________________________     Signature of patient or authorized representative                [employer] [facility] fax: [phone]

## 2022-07-01 ENCOUNTER — APPOINTMENT (OUTPATIENT)
Dept: NON INVASIVE DIAGNOSTICS | Age: 62
End: 2022-07-01
Attending: NURSE PRACTITIONER
Payer: COMMERCIAL

## 2022-07-01 ENCOUNTER — APPOINTMENT (OUTPATIENT)
Dept: GENERAL RADIOLOGY | Age: 62
End: 2022-07-01
Attending: FAMILY MEDICINE
Payer: COMMERCIAL

## 2022-07-01 ENCOUNTER — HOSPITAL ENCOUNTER (OUTPATIENT)
Age: 62
Setting detail: OBSERVATION
Discharge: HOME | End: 2022-07-02
Attending: FAMILY MEDICINE | Admitting: INTERNAL MEDICINE
Payer: COMMERCIAL

## 2022-07-01 DIAGNOSIS — I48.91 ATRIAL FIBRILLATION WITH RAPID VENTRICULAR RESPONSE (HCC): Primary | ICD-10-CM

## 2022-07-01 LAB
ALBUMIN SERPL-MCNC: 3.5 G/DL (ref 3.4–5)
ALBUMIN/GLOB SERPL: 0.9 {RATIO} (ref 0.8–1.7)
ALP SERPL-CCNC: 162 U/L (ref 45–117)
ALT SERPL-CCNC: 21 U/L (ref 13–56)
ANION GAP SERPL CALC-SCNC: 7 MMOL/L (ref 3–18)
APPEARANCE UR: CLEAR
AST SERPL W P-5'-P-CCNC: 18 U/L (ref 10–38)
ATRIAL RATE: 108 BPM
BACTERIA URNS QL MICRO: NEGATIVE /HPF
BASOPHILS # BLD: 0 K/UL (ref 0–0.1)
BASOPHILS NFR BLD: 0 % (ref 0–2)
BILIRUB SERPL-MCNC: 0.3 MG/DL (ref 0.2–1)
BILIRUB UR QL: NEGATIVE
BNP SERPL-MCNC: 39 PG/ML (ref 0–900)
BUN SERPL-MCNC: 38 MG/DL (ref 7–18)
BUN/CREAT SERPL: 32 (ref 12–20)
CA-I BLD-MCNC: 9.4 MG/DL (ref 8.5–10.1)
CALCULATED R AXIS, ECG10: 17 DEGREES
CALCULATED T AXIS, ECG11: 15 DEGREES
CHLORIDE SERPL-SCNC: 110 MMOL/L (ref 100–111)
CO2 SERPL-SCNC: 23 MMOL/L (ref 21–32)
COLOR UR: YELLOW
CREAT SERPL-MCNC: 1.2 MG/DL (ref 0.6–1.3)
DIAGNOSIS, 93000: NORMAL
DIFFERENTIAL METHOD BLD: NORMAL
ECHO AO ROOT DIAM: 3.1 CM
ECHO AO ROOT INDEX: 1.57 CM/M2
ECHO AV AREA PEAK VELOCITY: 2.7 CM2
ECHO AV AREA/BSA PEAK VELOCITY: 1.4 CM2/M2
ECHO AV MEAN GRADIENT: 5 MMHG
ECHO AV MEAN VELOCITY: 1 M/S
ECHO AV PEAK GRADIENT: 9 MMHG
ECHO AV PEAK VELOCITY: 1.5 M/S
ECHO AV VELOCITY RATIO: 0.73
ECHO AV VTI: 24.7 CM
ECHO EST RA PRESSURE: 3 MMHG
ECHO LA DIAMETER INDEX: 2.08 CM/M2
ECHO LA DIAMETER: 4.1 CM
ECHO LA TO AORTIC ROOT RATIO: 1.32
ECHO LA VOL 2C: 46 ML (ref 22–52)
ECHO LA VOL 4C: 67 ML (ref 22–52)
ECHO LA VOL BP: 56 ML (ref 22–52)
ECHO LA VOL/BSA BIPLANE: 28 ML/M2 (ref 16–34)
ECHO LA VOLUME AREA LENGTH: 60 ML
ECHO LA VOLUME INDEX A2C: 23 ML/M2 (ref 16–34)
ECHO LA VOLUME INDEX A4C: 34 ML/M2 (ref 16–34)
ECHO LA VOLUME INDEX AREA LENGTH: 30 ML/M2 (ref 16–34)
ECHO LV EDV A4C: 61 ML
ECHO LV EDV INDEX A4C: 31 ML/M2
ECHO LV EJECTION FRACTION A4C: 59 %
ECHO LV EJECTION FRACTION BIPLANE: 59 % (ref 55–100)
ECHO LV ESV A4C: 25 ML
ECHO LV ESV INDEX A4C: 13 ML/M2
ECHO LV FRACTIONAL SHORTENING: 32 % (ref 28–44)
ECHO LV INTERNAL DIMENSION DIASTOLE INDEX: 2.39 CM/M2
ECHO LV INTERNAL DIMENSION DIASTOLIC: 4.7 CM (ref 3.9–5.3)
ECHO LV INTERNAL DIMENSION SYSTOLIC INDEX: 1.62 CM/M2
ECHO LV INTERNAL DIMENSION SYSTOLIC: 3.2 CM
ECHO LV IVSD: 0.9 CM (ref 0.6–0.9)
ECHO LV MASS 2D: 142.7 G (ref 67–162)
ECHO LV MASS INDEX 2D: 72.4 G/M2 (ref 43–95)
ECHO LV POSTERIOR WALL DIASTOLIC: 0.9 CM (ref 0.6–0.9)
ECHO LV RELATIVE WALL THICKNESS RATIO: 0.38
ECHO LVOT AREA: 3.8 CM2
ECHO LVOT DIAM: 2.2 CM
ECHO LVOT PEAK GRADIENT: 5 MMHG
ECHO LVOT PEAK VELOCITY: 1.1 M/S
ECHO MV MAX VELOCITY: 0.9 M/S
ECHO MV MEAN GRADIENT: 1 MMHG
ECHO MV MEAN VELOCITY: 0.5 M/S
ECHO MV PEAK GRADIENT: 3 MMHG
ECHO MV VTI: 16.1 CM
ECHO PV MAX VELOCITY: 1.2 M/S
ECHO PV MEAN GRADIENT: 3 MMHG
ECHO PV MEAN VELOCITY: 0.8 M/S
ECHO PV PEAK GRADIENT: 6 MMHG
ECHO RIGHT VENTRICULAR SYSTOLIC PRESSURE (RVSP): 25 MMHG
ECHO TV REGURGITANT MAX VELOCITY: 2.37 M/S
ECHO TV REGURGITANT PEAK GRADIENT: 23 MMHG
EOSINOPHIL # BLD: 0.2 K/UL (ref 0–0.4)
EOSINOPHIL NFR BLD: 2 % (ref 0–5)
EPITH CASTS URNS QL MICRO: ABNORMAL /LPF (ref 0–20)
ERYTHROCYTE [DISTWIDTH] IN BLOOD BY AUTOMATED COUNT: 13.2 % (ref 11.6–14.5)
GLOBULIN SER CALC-MCNC: 4 G/DL (ref 2–4)
GLUCOSE SERPL-MCNC: 154 MG/DL (ref 74–99)
GLUCOSE UR STRIP.AUTO-MCNC: NEGATIVE MG/DL
HCT VFR BLD AUTO: 38.6 % (ref 35–45)
HGB BLD-MCNC: 12.9 G/DL (ref 12–16)
HGB UR QL STRIP: NEGATIVE
IMM GRANULOCYTES # BLD AUTO: 0 K/UL
IMM GRANULOCYTES NFR BLD AUTO: 0 %
INR PPP: 0.8 (ref 0.8–1.2)
KETONES UR QL STRIP.AUTO: NEGATIVE MG/DL
LEUKOCYTE ESTERASE UR QL STRIP.AUTO: ABNORMAL
LYMPHOCYTES # BLD: 2.4 K/UL (ref 0.9–3.6)
LYMPHOCYTES NFR BLD: 22 % (ref 21–52)
MAGNESIUM SERPL-MCNC: 2.1 MG/DL (ref 1.6–2.6)
MCH RBC QN AUTO: 29 PG (ref 24–34)
MCHC RBC AUTO-ENTMCNC: 33.4 G/DL (ref 31–37)
MCV RBC AUTO: 86.7 FL (ref 78–100)
MONOCYTES # BLD: 0.4 K/UL (ref 0.05–1.2)
MONOCYTES NFR BLD: 4 % (ref 3–10)
NEUTS SEG # BLD: 5.9 K/UL (ref 1.8–8)
NEUTS SEG NFR BLD: 53 % (ref 40–73)
NITRITE UR QL STRIP.AUTO: NEGATIVE
NRBC # BLD: 0 K/UL (ref 0–0.01)
NRBC BLD-RTO: 0 PER 100 WBC
OTHER CELLS NFR BLD MANUAL: 19 %
P-R INTERVAL, ECG05: 43 MS
PH UR STRIP: 7.5 [PH] (ref 5–8)
PLATELET # BLD AUTO: 342 K/UL (ref 135–420)
PMV BLD AUTO: 9.6 FL (ref 9.2–11.8)
POTASSIUM SERPL-SCNC: 3.8 MMOL/L (ref 3.5–5.5)
PROT SERPL-MCNC: 7.5 G/DL (ref 6.4–8.2)
PROT UR STRIP-MCNC: 30 MG/DL
PROTHROMBIN TIME: 11.9 SEC (ref 11.5–15.2)
Q-T INTERVAL, ECG07: 273 MS
QRS DURATION, ECG06: 86 MS
QTC CALCULATION (BEZET), ECG08: 442 MS
RBC # BLD AUTO: 4.45 M/UL (ref 4.2–5.3)
RBC #/AREA URNS HPF: ABNORMAL /HPF (ref 0–2)
RBC MORPH BLD: NORMAL
SODIUM SERPL-SCNC: 140 MMOL/L (ref 136–145)
SP GR UR REFRACTOMETRY: 1.01 (ref 1–1.03)
TROPONIN-HIGH SENSITIVITY: 5 NG/L (ref 0–54)
TROPONIN-HIGH SENSITIVITY: 8 NG/L (ref 0–54)
UROBILINOGEN UR QL STRIP.AUTO: 2 EU/DL (ref 0.2–1)
VENTRICULAR RATE, ECG03: 157 BPM
WBC # BLD AUTO: 11.1 K/UL (ref 4.6–13.2)
WBC URNS QL MICRO: ABNORMAL /HPF (ref 0–4)

## 2022-07-01 PROCEDURE — 99285 EMERGENCY DEPT VISIT HI MDM: CPT

## 2022-07-01 PROCEDURE — 96372 THER/PROPH/DIAG INJ SC/IM: CPT

## 2022-07-01 PROCEDURE — 84484 ASSAY OF TROPONIN QUANT: CPT

## 2022-07-01 PROCEDURE — 81001 URINALYSIS AUTO W/SCOPE: CPT

## 2022-07-01 PROCEDURE — G0378 HOSPITAL OBSERVATION PER HR: HCPCS

## 2022-07-01 PROCEDURE — 74011000250 HC RX REV CODE- 250: Performed by: NURSE PRACTITIONER

## 2022-07-01 PROCEDURE — 80053 COMPREHEN METABOLIC PANEL: CPT

## 2022-07-01 PROCEDURE — 74011000250 HC RX REV CODE- 250: Performed by: FAMILY MEDICINE

## 2022-07-01 PROCEDURE — 93005 ELECTROCARDIOGRAM TRACING: CPT

## 2022-07-01 PROCEDURE — 74011250636 HC RX REV CODE- 250/636: Performed by: NURSE PRACTITIONER

## 2022-07-01 PROCEDURE — 36415 COLL VENOUS BLD VENIPUNCTURE: CPT

## 2022-07-01 PROCEDURE — 85025 COMPLETE CBC W/AUTO DIFF WBC: CPT

## 2022-07-01 PROCEDURE — 96374 THER/PROPH/DIAG INJ IV PUSH: CPT

## 2022-07-01 PROCEDURE — 85610 PROTHROMBIN TIME: CPT

## 2022-07-01 PROCEDURE — 83735 ASSAY OF MAGNESIUM: CPT

## 2022-07-01 PROCEDURE — 71045 X-RAY EXAM CHEST 1 VIEW: CPT

## 2022-07-01 PROCEDURE — 74011250637 HC RX REV CODE- 250/637: Performed by: NURSE PRACTITIONER

## 2022-07-01 PROCEDURE — 83880 ASSAY OF NATRIURETIC PEPTIDE: CPT

## 2022-07-01 PROCEDURE — 74011250636 HC RX REV CODE- 250/636: Performed by: FAMILY MEDICINE

## 2022-07-01 PROCEDURE — 93306 TTE W/DOPPLER COMPLETE: CPT

## 2022-07-01 RX ORDER — LISINOPRIL 20 MG/1
20 TABLET ORAL DAILY
Status: DISCONTINUED | OUTPATIENT
Start: 2022-07-01 | End: 2022-07-02 | Stop reason: HOSPADM

## 2022-07-01 RX ORDER — ACETAMINOPHEN 325 MG/1
650 TABLET ORAL
Status: DISCONTINUED | OUTPATIENT
Start: 2022-07-01 | End: 2022-07-02 | Stop reason: HOSPADM

## 2022-07-01 RX ORDER — SODIUM CHLORIDE 0.9 % (FLUSH) 0.9 %
5-40 SYRINGE (ML) INJECTION EVERY 8 HOURS
Status: DISCONTINUED | OUTPATIENT
Start: 2022-07-01 | End: 2022-07-02 | Stop reason: HOSPADM

## 2022-07-01 RX ORDER — ONDANSETRON 4 MG/1
4 TABLET, ORALLY DISINTEGRATING ORAL
Status: DISCONTINUED | OUTPATIENT
Start: 2022-07-01 | End: 2022-07-02 | Stop reason: HOSPADM

## 2022-07-01 RX ORDER — ACETAMINOPHEN 650 MG/1
650 SUPPOSITORY RECTAL
Status: DISCONTINUED | OUTPATIENT
Start: 2022-07-01 | End: 2022-07-02 | Stop reason: HOSPADM

## 2022-07-01 RX ORDER — DOXEPIN HYDROCHLORIDE 25 MG/1
50 CAPSULE ORAL
Status: DISCONTINUED | OUTPATIENT
Start: 2022-07-01 | End: 2022-07-02 | Stop reason: HOSPADM

## 2022-07-01 RX ORDER — METOPROLOL TARTRATE 5 MG/5ML
2.5 INJECTION INTRAVENOUS
Status: COMPLETED | OUTPATIENT
Start: 2022-07-01 | End: 2022-07-01

## 2022-07-01 RX ORDER — DOXEPIN HYDROCHLORIDE 25 MG/1
50 CAPSULE ORAL
Status: DISCONTINUED | OUTPATIENT
Start: 2022-07-01 | End: 2022-07-01

## 2022-07-01 RX ORDER — SODIUM CHLORIDE 0.9 % (FLUSH) 0.9 %
5-40 SYRINGE (ML) INJECTION AS NEEDED
Status: DISCONTINUED | OUTPATIENT
Start: 2022-07-01 | End: 2022-07-02 | Stop reason: HOSPADM

## 2022-07-01 RX ORDER — LISINOPRIL AND HYDROCHLOROTHIAZIDE 12.5; 2 MG/1; MG/1
1 TABLET ORAL DAILY
Status: DISCONTINUED | OUTPATIENT
Start: 2022-07-01 | End: 2022-07-01 | Stop reason: CLARIF

## 2022-07-01 RX ORDER — ONDANSETRON 2 MG/ML
4 INJECTION INTRAMUSCULAR; INTRAVENOUS
Status: DISCONTINUED | OUTPATIENT
Start: 2022-07-01 | End: 2022-07-02 | Stop reason: HOSPADM

## 2022-07-01 RX ORDER — DILTIAZEM HYDROCHLORIDE 30 MG/1
30 TABLET, FILM COATED ORAL EVERY 6 HOURS
Status: DISCONTINUED | OUTPATIENT
Start: 2022-07-01 | End: 2022-07-02 | Stop reason: HOSPADM

## 2022-07-01 RX ORDER — HYDROCHLOROTHIAZIDE 25 MG/1
12.5 TABLET ORAL DAILY
Status: DISCONTINUED | OUTPATIENT
Start: 2022-07-01 | End: 2022-07-01

## 2022-07-01 RX ORDER — POLYETHYLENE GLYCOL 3350 17 G/17G
17 POWDER, FOR SOLUTION ORAL DAILY PRN
Status: DISCONTINUED | OUTPATIENT
Start: 2022-07-01 | End: 2022-07-02 | Stop reason: HOSPADM

## 2022-07-01 RX ORDER — ENOXAPARIN SODIUM 100 MG/ML
40 INJECTION SUBCUTANEOUS DAILY
Status: DISCONTINUED | OUTPATIENT
Start: 2022-07-01 | End: 2022-07-01

## 2022-07-01 RX ADMIN — SODIUM CHLORIDE, PRESERVATIVE FREE 10 ML: 5 INJECTION INTRAVENOUS at 21:08

## 2022-07-01 RX ADMIN — APIXABAN 5 MG: 5 TABLET, FILM COATED ORAL at 11:37

## 2022-07-01 RX ADMIN — DILTIAZEM HYDROCHLORIDE 30 MG: 30 TABLET, FILM COATED ORAL at 11:37

## 2022-07-01 RX ADMIN — APIXABAN 5 MG: 5 TABLET, FILM COATED ORAL at 21:07

## 2022-07-01 RX ADMIN — SODIUM CHLORIDE 1000 ML: 9 INJECTION, SOLUTION INTRAVENOUS at 02:00

## 2022-07-01 RX ADMIN — SODIUM CHLORIDE, PRESERVATIVE FREE 10 ML: 5 INJECTION INTRAVENOUS at 05:12

## 2022-07-01 RX ADMIN — ACETAMINOPHEN 650 MG: 325 TABLET ORAL at 19:31

## 2022-07-01 RX ADMIN — HYDROCHLOROTHIAZIDE 12.5 MG: 25 TABLET ORAL at 09:09

## 2022-07-01 RX ADMIN — METOPROLOL TARTRATE 2.5 MG: 5 INJECTION INTRAVENOUS at 01:04

## 2022-07-01 RX ADMIN — DILTIAZEM HYDROCHLORIDE 30 MG: 30 TABLET, FILM COATED ORAL at 17:31

## 2022-07-01 RX ADMIN — ACETAMINOPHEN 650 MG: 325 TABLET ORAL at 14:22

## 2022-07-01 RX ADMIN — ENOXAPARIN SODIUM 40 MG: 100 INJECTION SUBCUTANEOUS at 09:09

## 2022-07-01 RX ADMIN — LISINOPRIL 20 MG: 20 TABLET ORAL at 09:09

## 2022-07-01 RX ADMIN — DOXEPIN HYDROCHLORIDE 50 MG: 25 CAPSULE ORAL at 21:07

## 2022-07-01 RX ADMIN — SODIUM CHLORIDE, PRESERVATIVE FREE 10 ML: 5 INJECTION INTRAVENOUS at 14:22

## 2022-07-01 NOTE — CONSULTS
[facility] CARDIOLOGY  CARDIOLOGY CONSULTATION    REASON FOR CONSULT: New onset A. fib with RVR    REQUESTING PROVIDER: WIL Farooq NP    CHIEF COMPLAINT: Palpitations    HISTORY OF PRESENT ILLNESS:  Tonio Ferris is a 57y.o. year-old female with past medical history significant for hypertension and anxiety who was seen today for evaluation new onset atrial fibrillation with RVR. The patient presented to the [facility] ER early this morning for evaluation of palpitations. She reports having episodes over the past month of feeling jittery, lightheaded, and feeling like her heart is racing. She also reports severe fatigue over the past month with a very poor energy level which is unusual for her. She states that usually her symptoms will improve if she relaxes and deep breathes. She states that overnight her symptoms significantly worsened and her entire body was trembling. She checked her blood pressure and her heart rate was in the 150s to 160s. She presented to the ER and was in A. fib with RVR; her rate was 157. She was given 2.5 mg of IV Lopressor and 1 L of normal saline. At the time of the visit, she remains in A. fib with rate in the 100s to 110s. She states the palpitations are not as significant at this time. Records from hospital admission course thus far reviewed. Telemetry reviewed. Events as above. The patient is in atrial fibrillation with rate in the 100s to 110s. INPATIENT MEDICATIONS:  Home medications reviewed.     Current Facility-Administered Medications:     sodium chloride (NS) flush 5-40 mL, 5-40 mL, IntraVENous, Q8H, Ekaterina Toledo NP, 10 mL at 07/01/22 0512    sodium chloride (NS) flush 5-40 mL, 5-40 mL, IntraVENous, PRN, Jerrod George NP    acetaminophen (TYLENOL) tablet 650 mg, 650 mg, Oral, Q6H PRN **OR** acetaminophen (TYLENOL) suppository 650 mg, 650 mg, Rectal, Q6H PRN, Nelsy SPENCER NP    polyethylene glycol (MIRALAX) packet 17 g, 17 g, Oral, DAILY PRN, Jerica SPENCER NP    ondansetron (ZOFRAN ODT) tablet 4 mg, 4 mg, Oral, Q8H PRN **OR** ondansetron (ZOFRAN) injection 4 mg, 4 mg, IntraVENous, Q6H PRN, Martin Londono NP    doxepin (SINEquan) capsule 50 mg, 50 mg, Oral, QHS, Ekaterina Toledo NP    lisinopriL (PRINIVIL, ZESTRIL) tablet 20 mg, 20 mg, Oral, DAILY, 20 mg at 07/01/22 0909 **AND** [DISCONTINUED] hydroCHLOROthiazide (HYDRODIURIL) tablet 12.5 mg, 12.5 mg, Oral, DAILY, Jerica Ales A, NP, 12.5 mg at 07/01/22 0909    apixaban (ELIQUIS) tablet 5 mg, 5 mg, Oral, Q12H, Nancy, Tory A, NP, 5 mg at 07/01/22 1137    dilTIAZem IR (CARDIZEM) tablet 30 mg, 30 mg, Oral, Q6H, [place], Tory A, NP, 30 mg at 07/01/22 1137     ALLERGIES:  Allergies reviewed with the patient,No Known Allergies . FAMILY HISTORY:  Family history reviewed. Her father, paternal grandparents, paternal aunt, and paternal uncle all have atrial fibrillation. SOCIAL HISTORY:  Notable for no tobacco use, no alcohol use, and no illicit drug use. REVIEW OF SYSTEMS:  Complete review of systems performed, pertinents noted above, all other systems are negative. PHYSICAL EXAMINATION:  Vital sign assessment reveal a blood pressure of 121/79 and pulse rate of 124. Cardiovascular exam has a heart with an irregularly irregular rhythm and mildly elevated rate, normal S1 and S2. No murmur present. There are no rubs or gallops. Good peripheral pulses. No jugular venous distension. Respiratory exam reveals clear lung fields, no rales or rhonchi. Lymphatic exam reveals no edema. Neurologic exam is nonfocal.      Recent labs results and imaging reviewed.   Notable findings include:   Recent Results (from the past 24 hour(s))   URINALYSIS W/ RFLX MICROSCOPIC    Collection Time: 07/01/22 12:05 AM   Result Value Ref Range    Color Yellow      Appearance Clear      Specific gravity 1.015 1.005 - 1.030      pH (UA) 7.5 5.0 - 8.0      Protein 30 (A) Negative mg/dL    Glucose Negative Negative mg/dL    Ketone Negative Negative mg/dL    Bilirubin Negative Negative      Blood Negative Negative      Urobilinogen 2.0 (H) 0.2 - 1.0 EU/dL    Nitrites Negative Negative      Leukocyte Esterase Trace (A) Negative     URINE MICROSCOPIC    Collection Time: 07/01/22 12:05 AM   Result Value Ref Range    WBC 5-10 0 - 4 /hpf    RBC 0-5 0 - 2 /hpf    Epithelial cells Few 0 - 20 /lpf    Bacteria Negative (A) None /hpf   EKG, 12 LEAD, INITIAL    Collection Time: 07/01/22 12:32 AM   Result Value Ref Range    Ventricular Rate 157 BPM    Atrial Rate 108 BPM    P-R Interval 43 ms    QRS Duration 86 ms    Q-T Interval 273 ms    QTC Calculation (Bezet) 442 ms    Calculated R Axis 17 degrees    Calculated T Axis 15 degrees    Diagnosis       Atrial fibrillation with rapid V-rate  ST depression, probably rate related     CBC WITH AUTOMATED DIFF    Collection Time: 07/01/22 12:45 AM   Result Value Ref Range    WBC 11.1 4.6 - 13.2 K/uL    RBC 4.45 4.20 - 5.30 M/uL    HGB 12.9 12.0 - 16.0 g/dL    HCT 38.6 35.0 - 45.0 %    MCV 86.7 78.0 - 100.0 FL    MCH 29.0 24.0 - 34.0 PG    MCHC 33.4 31.0 - 37.0 g/dL    RDW 13.2 11.6 - 14.5 %    PLATELET 441 787 - 727 K/uL    MPV 9.6 9.2 - 11.8 FL    NRBC 0.0 0.0  WBC    ABSOLUTE NRBC 0.00 0.00 - 0.01 K/uL    NEUTROPHILS 53 40 - 73 %    LYMPHOCYTES 22 21 - 52 %    MONOCYTES 4 3 - 10 %    EOSINOPHILS 2 0 - 5 %    BASOPHILS 0 0 - 2 %    OTHER CELL 19 %    IMMATURE GRANULOCYTES 0 %    ABS. NEUTROPHILS 5.9 1.8 - 8.0 K/UL    ABS. LYMPHOCYTES 2.4 0.9 - 3.6 K/UL    ABS. MONOCYTES 0.4 0.05 - 1.2 K/UL    ABS. EOSINOPHILS 0.2 0.0 - 0.4 K/UL    ABS. BASOPHILS 0.0 0.0 - 0.1 K/UL    ABS. IMM.  GRANS. 0.0 K/UL    DF AUTOMATED      RBC COMMENTS Normocytic, Normochromic     PROTHROMBIN TIME + INR    Collection Time: 07/01/22 12:45 AM   Result Value Ref Range    Prothrombin time 11.9 11.5 - 15.2 sec    INR 0.8 0.8 - 1.2     METABOLIC PANEL, COMPREHENSIVE Collection Time: 07/01/22 12:45 AM   Result Value Ref Range    Sodium 140 136 - 145 mmol/L    Potassium 3.8 3.5 - 5.5 mmol/L    Chloride 110 100 - 111 mmol/L    CO2 23 21 - 32 mmol/L    Anion gap 7 3.0 - 18.0 mmol/L    Glucose 154 (H) 74 - 99 mg/dL    BUN 38 (H) 7 - 18 mg/dL    Creatinine 1.20 0.60 - 1.30 mg/dL    BUN/Creatinine ratio 32 (H) 12 - 20      GFR est AA 55 (L) >60 ml/min/1.73m2    GFR est non-AA 45 (L) >60 ml/min/1.73m2    Calcium 9.4 8.5 - 10.1 mg/dL    Bilirubin, total 0.3 0.2 - 1.0 mg/dL    AST (SGOT) 18 10 - 38 U/L    ALT (SGPT) 21 13 - 56 U/L    Alk.  phosphatase 162 (H) 45 - 117 U/L    Protein, total 7.5 6.4 - 8.2 g/dL    Albumin 3.5 3.4 - 5.0 g/dL    Globulin 4.0 2.0 - 4.0 g/dL    A-G Ratio 0.9 0.8 - 1.7     NT-PRO BNP    Collection Time: 07/01/22 12:45 AM   Result Value Ref Range    NT pro-BNP 39 0 - 900 pg/mL   TROPONIN-HIGH SENSITIVITY    Collection Time: 07/01/22 12:45 AM   Result Value Ref Range    Troponin-High Sensitivity 5 0 - 54 ng/L   MAGNESIUM    Collection Time: 07/01/22 12:45 AM   Result Value Ref Range    Magnesium 2.1 1.6 - 2.6 mg/dL   TROPONIN-HIGH SENSITIVITY    Collection Time: 07/01/22  5:20 AM   Result Value Ref Range    Troponin-High Sensitivity 8 0 - 54 ng/L   ECHO ADULT COMPLETE    Collection Time: 07/01/22 10:12 AM   Result Value Ref Range    IVSd 0.9 0.6 - 0.9 cm    LVIDd 4.7 3.9 - 5.3 cm    LVIDs 3.2 cm    LVOT Diameter 2.2 cm    LVPWd 0.9 0.6 - 0.9 cm    LV Ejection Fraction A4C 59 %    LV EDV A4C 61 mL    LV ESV A4C 25 mL    LVOT Peak Gradient 5 mmHg    LVOT Peak Velocity 1.1 m/s    LA Diameter 4.1 cm    LA Volume A/L 60 mL    LA Volume 2C 46 22 - 52 mL    LA Volume 4C 67 (A) 22 - 52 mL    LA Volume BP 56 (A) 22 - 52 mL    AV Area by Peak Velocity 2.7 cm2    AV Peak Gradient 9 mmHg    AV Mean Gradient 5 mmHg    AV Peak Velocity 1.5 m/s    AV Mean Velocity 1.0 m/s    AV VTI 24.7 cm    MV Peak Gradient 3 mmHg    MV Mean Gradient 1 mmHg    MV Max Velocity 0.9 m/s    MV Mean Velocity 0.5 m/s    MV VTI 16.1 cm    PV Peak Gradient 6 mmHg    PV Mean Gradient 3 mmHg    PV Max Velocity 1.2 m/s    PV Mean Velocity 0.8 m/s    TR Peak Gradient 23 mmHg    TR Max Velocity 2.37 m/s    Aortic Root 3.1 cm    Fractional Shortening 2D 32 28 - 44 %    LV ESV Index A4C 13 mL/m2    LV EDV Index A4C 31 mL/m2    LVIDd Index 2.39 cm/m2    LVIDs Index 1.62 cm/m2    LV RWT Ratio 0.38     LV Mass 2D 142.7 67 - 162 g    LV Mass 2D Index 72.4 43 - 95 g/m2    LA Volume Index BP 28 16 - 34 ml/m2    LA Volume Index A/L 30 16 - 34 mL/m2    LVOT Area 3.8 cm2    LA Volume Index 2C 23 16 - 34 mL/m2    LA Volume Index 4C 34 16 - 34 mL/m2    LA Size Index 2.08 cm/m2    LA/AO Root Ratio 1.32     Ao Root Index 1.57 cm/m2    AV Velocity Ratio 0.73     NOAH/BSA Peak Velocity 1.4 cm2/m2    EF BP 59 55 - 100 %    Est. RA Pressure 3 mmHg    RVSP 25 mmHg       Discussed case with Dr. Kelby Singh, and our impression and recommendations are as follows:  1. New onset atrial fibrillation with RVR  a. We will start diltiazem 30 mg p.o. every 6 hours  i. Titrate dose based on response  ii. Would recommend to transition to extended release equivalent dosing prior to discharge  b. Her CHADS2-Vasc score is 2, will start Eliquis 5 mg p.o. twice daily  c. Keep serum potassium between 4-5 and serum magnesium > 2  d. Continue telemetry monitoring  e. Echocardiogram is pending  2. Hypertension  a. We will stop HCTZ to allow for usage of diltiazem  b. Monitor closely    The patient has a follow-up appointment at [facility] on 7/15/2022 at 11:30 AM with Tiffany Alba NP for general cardiology evaluation; she also has an appointment on 7/21/2022 at 3 PM with Faustino Guzman MD for cardiac electrophysiology evaluation. Thank you for involving us in the care of this patient. Please do not hesitate to call me or Dr. Kelby Singh if additional questions arise.   If assistance is needed after hours or during the weekend, please call the answering [facility] at [phone].

## 2022-07-01 NOTE — PROGRESS NOTES
Care Management Interventions  PCP Verified by CM: Yes  Last Visit to PCP: 01/27/22  Palliative Care Criteria Met (RRAT>21 & CHF Dx)?: No  Mode of Transport at Discharge: Self  Transition of Care Consult (CM Consult): Discharge Planning  Discharge Durable Medical Equipment: No  Physical Therapy Consult: No  Occupational Therapy Consult: No  Speech Therapy Consult: No  Support Systems: Other Family Member(s)  Confirm Follow Up Transport: Self  The Plan for Transition of Care is Related to the Following Treatment Goals : Patient centered discharge planning to ensure smooth transition to the community and PLOF. Discharge Location  Patient Expects to be Discharged to[de-identified] Home     Will follow for admission. Pt centered discharge planning to ensure smooth transition to community and PLOF. Pt interviewed and chart reviewed. Pt has no needs at [facility]. Will follow for any changes to POC.

## 2022-07-01 NOTE — ASSESSMENT & PLAN NOTE
Palpitations on and off for the past month  RVR tonight rate lowered with Lopressor  Cardiology consult  Cardiac monitoring  No chest pain reported  First troponin negative will repeat

## 2022-07-01 NOTE — ED PROVIDER NOTES
EMERGENCY DEPARTMENT HISTORY AND PHYSICAL EXAM      Date: 7/1/2022  Patient Name: Gus Caal    History of Presenting Illness     Chief Complaint   Patient presents with    Palpitations       History Provided By: Patient    HPI: Gus Caal, 57 y.o. female with a past medical history significant hypertension and anxiety, migraine headaches, plantar fasciitis, presents to the ED with cc of heart palpitations and rapid heart rate that started shortly prior to arrival. She has a hsitory of HTN and anxiety, and lately she has been having more bouts of what she thought was anxiety, but tonight, the anxiety was sustained and with a very fast HR. Her mother is present with her and told her that atrial fibrillation runs in both her (patient's) father and brother (both sides of family). Sure enough, patient's triage EKG shows fibrillation with rapid ventricular rate. She has no personal cardiac history aside from hypertension. She has never been told she has A. fib before. She takes no blood thinners. She has no other symptoms besides the anxiety, palpitations, rapid heart rate. There are no other complaints, changes, or physical findings at this time. PCP: Charan Peterson NP    No current facility-administered medications on file prior to encounter. Current Outpatient Medications on File Prior to Encounter   Medication Sig Dispense Refill    doxepin (SINEquan) 25 mg capsule Take 2 Capsules by mouth nightly. 90 Capsule 1    lisinopril-hydroCHLOROthiazide (PRINZIDE, ZESTORETIC) 20-12.5 mg per tablet Take one tablet daily 90 Tablet 3    carboxymethylcellulose-citric 0.75 gram cap Take 3 Capsules by mouth two (2) times a day. Take 3 capsules 20-30 minutes prior to lunch and prior to dinner (Patient not taking: Reported on 7/1/2022) 168 Capsule 2    carboxymethylcellulose-citric (Plenity) 0.75 gram cap Take 3 Each by mouth Before breakfast and dinner.  (Patient not taking: Reported on 7/1/2022) 84 Capsule 2    butalbital-acetaminophen-caffeine (FIORICET, ESGIC) -40 mg per tablet Take 1 Tablet by mouth every six (6) hours as needed for Headache. 30 Tablet 1       Past History     Past Medical History:  Past Medical History:   Diagnosis Date    Hypertension        Past Surgical History:  Past Surgical History:   Procedure Laterality Date    HX KNEE REPLACEMENT Right 03/01/2020    HX OTHER SURGICAL      lap band    HX TUBAL LIGATION         Family History:  Family History   Problem Relation Age of Onset    Heart Disease Father        Social History:  Social History     Tobacco Use    Smoking status: Never Smoker    Smokeless tobacco: Never Used   Substance Use Topics    Alcohol use: Yes     Comment: occationally    Drug use: Never       Allergies:  No Known Allergies      Review of Systems   CONSTITUTIONAL: Denies weight loss, fever and chills. HEENT: Denies changes in vision and hearing. RESPIRATORY: Denies SOB and cough. CV: +Palpitations and rapid HR. Denies CP. GI: Denies abdominal pain, nausea, vomiting and diarrhea. : Denies dysuria and urinary frequency. MSK: Denies myalgia and joint pain. SKIN: Denies rash and pruritus. NEUROLOGICAL: Denies headache and syncope. PSYCHIATRIC: Denies recent changes in mood. Denies anxiety and depression. Review of Systems    Physical Exam   GENERAL: Afebrile. Tachycardic with -160s. Alert and oriented x 3. No acute distress. Well-nourished. EYES: EOMI. Anicteric. HENT: Moist mucous membranes. No scleral icterus. No cervical lymphadenopathy. LUNGS: Clear to auscultation bilaterally. No accessory muscle use. CARDIOVASCULAR: Tachycardic, irregularly irregular. ABDOMEN: Soft, non-tender and non-distended. No palpable masses. EXTREMITIES: No edema. Non-tender. SKIN: No rashes or lesions. Warm. NEUROLOGIC: No focal neurological deficits. CN II-XII grossly intact, but not individually tested. PSYCHIATRIC: Cooperative. Appropriate mood and affect. Physical Exam    Lab and Diagnostic Study Results     Labs -     Recent Results (from the past 12 hour(s))   URINALYSIS W/ RFLX MICROSCOPIC    Collection Time: 07/01/22 12:05 AM   Result Value Ref Range    Color Yellow      Appearance Clear      Specific gravity 1.015 1.005 - 1.030      pH (UA) 7.5 5.0 - 8.0      Protein 30 (A) Negative mg/dL    Glucose Negative Negative mg/dL    Ketone Negative Negative mg/dL    Bilirubin Negative Negative      Blood Negative Negative      Urobilinogen 2.0 (H) 0.2 - 1.0 EU/dL    Nitrites Negative Negative      Leukocyte Esterase Trace (A) Negative     URINE MICROSCOPIC    Collection Time: 07/01/22 12:05 AM   Result Value Ref Range    WBC 5-10 0 - 4 /hpf    RBC 0-5 0 - 2 /hpf    Epithelial cells Few 0 - 20 /lpf    Bacteria Negative (A) None /hpf   EKG, 12 LEAD, INITIAL    Collection Time: 07/01/22 12:32 AM   Result Value Ref Range    Ventricular Rate 157 BPM    Atrial Rate 108 BPM    P-R Interval 43 ms    QRS Duration 86 ms    Q-T Interval 273 ms    QTC Calculation (Bezet) 442 ms    Calculated R Axis 17 degrees    Calculated T Axis 15 degrees    Diagnosis       Atrial fibrillation with rapid V-rate  ST depression, probably rate related     CBC WITH AUTOMATED DIFF    Collection Time: 07/01/22 12:45 AM   Result Value Ref Range    WBC 11.1 4.6 - 13.2 K/uL    RBC 4.45 4.20 - 5.30 M/uL    HGB 12.9 12.0 - 16.0 g/dL    HCT 38.6 35.0 - 45.0 %    MCV 86.7 78.0 - 100.0 FL    MCH 29.0 24.0 - 34.0 PG    MCHC 33.4 31.0 - 37.0 g/dL    RDW 13.2 11.6 - 14.5 %    PLATELET 366 671 - 237 K/uL    MPV 9.6 9.2 - 11.8 FL    NRBC 0.0 0.0  WBC    ABSOLUTE NRBC 0.00 0.00 - 0.01 K/uL    NEUTROPHILS 53 40 - 73 %    LYMPHOCYTES 22 21 - 52 %    MONOCYTES 4 3 - 10 %    EOSINOPHILS 2 0 - 5 %    BASOPHILS 0 0 - 2 %    OTHER CELL 19 %    IMMATURE GRANULOCYTES 0 %    ABS. NEUTROPHILS 5.9 1.8 - 8.0 K/UL    ABS. LYMPHOCYTES 2.4 0.9 - 3.6 K/UL    ABS.  MONOCYTES 0.4 0.05 - 1.2 K/UL ABS. EOSINOPHILS 0.2 0.0 - 0.4 K/UL    ABS. BASOPHILS 0.0 0.0 - 0.1 K/UL    ABS. IMM. GRANS. 0.0 K/UL    DF AUTOMATED      RBC COMMENTS Normocytic, Normochromic     PROTHROMBIN TIME + INR    Collection Time: 07/01/22 12:45 AM   Result Value Ref Range    Prothrombin time 11.9 11.5 - 15.2 sec    INR 0.8 0.8 - 1.2     METABOLIC PANEL, COMPREHENSIVE    Collection Time: 07/01/22 12:45 AM   Result Value Ref Range    Sodium 140 136 - 145 mmol/L    Potassium 3.8 3.5 - 5.5 mmol/L    Chloride 110 100 - 111 mmol/L    CO2 23 21 - 32 mmol/L    Anion gap 7 3.0 - 18.0 mmol/L    Glucose 154 (H) 74 - 99 mg/dL    BUN 38 (H) 7 - 18 mg/dL    Creatinine 1.20 0.60 - 1.30 mg/dL    BUN/Creatinine ratio 32 (H) 12 - 20      GFR est AA 55 (L) >60 ml/min/1.73m2    GFR est non-AA 45 (L) >60 ml/min/1.73m2    Calcium 9.4 8.5 - 10.1 mg/dL    Bilirubin, total 0.3 0.2 - 1.0 mg/dL    AST (SGOT) 18 10 - 38 U/L    ALT (SGPT) 21 13 - 56 U/L    Alk. phosphatase 162 (H) 45 - 117 U/L    Protein, total 7.5 6.4 - 8.2 g/dL    Albumin 3.5 3.4 - 5.0 g/dL    Globulin 4.0 2.0 - 4.0 g/dL    A-G Ratio 0.9 0.8 - 1.7     NT-PRO BNP    Collection Time: 07/01/22 12:45 AM   Result Value Ref Range    NT pro-BNP 39 0 - 900 pg/mL   TROPONIN-HIGH SENSITIVITY    Collection Time: 07/01/22 12:45 AM   Result Value Ref Range    Troponin-High Sensitivity 5 0 - 54 ng/L   MAGNESIUM    Collection Time: 07/01/22 12:45 AM   Result Value Ref Range    Magnesium 2.1 1.6 - 2.6 mg/dL   TROPONIN-HIGH SENSITIVITY    Collection Time: 07/01/22  5:20 AM   Result Value Ref Range    Troponin-High Sensitivity 8 0 - 54 ng/L       Radiologic Studies -   @lastxrresult@  CT Results  (Last 48 hours)    None        CXR Results  (Last 48 hours)               07/01/22 0202  XR CHEST PORT Final result    Impression:      No active cardiopulmonary disease.        Narrative:  EXAM: CHEST RADIOGRAPH       CLINICAL INDICATION/HISTORY: palpitations, a-fib   -Additional: None       COMPARISON: 2/19/2020 TECHNIQUE: Portable frontal view of the chest       _______________       FINDINGS:       SUPPORT DEVICES: None. HEART AND MEDIASTINUM: No appreciable cardiomegaly. Remaining mediastinal   contours within normal limits. LUNGS AND PLEURAL SPACES: Dense nodule in the left upper lobe, consistent with a   calcified granuloma. Otherwise clear. BONY THORAX AND SOFT TISSUES: Unremarkable.       _______________                   Medical Decision Making   - I am the first provider for this patient. - I reviewed the vital signs, available nursing notes, past medical history, past surgical history, family history and social history. - Initial assessment performed. The patients presenting problems have been discussed, and they are in agreement with the care plan formulated and outlined with them. I have encouraged them to ask questions as they arise throughout their visit. Vital Signs-Reviewed the patient's vital signs. Patient Vitals for the past 12 hrs:   Temp Pulse Resp BP SpO2   07/01/22 0435 96.9 °F (36.1 °C) -- 20 111/72 100 %   07/01/22 0400 -- (!) 123 -- -- --   07/01/22 0310 -- (!) 114 -- 115/74 --   07/01/22 0240 -- (!) 129 -- 119/76 --   07/01/22 0202 -- (!) 136 22 119/76 --   07/01/22 0120 -- (!) 132 22 105/65 97 %   07/01/22 0104 -- (!) 154 -- (!) 140/93 --   07/01/22 0039 97.7 °F (36.5 °C) (!) 162 22 (!) 169/81 98 %       Records Reviewed: Nursing Notes and Old Medical Records    The patient presents with heart palpitations with a differential diagnosis of ACS, arrhythmia, electrolyte abnormality, anxiety, panic attack, atrial fibrillation, SVT, dehydration, pneumonia, viral illness, bronchitis, asthma      ED Course:     ED Course as of 07/01/22 0621 Fri Jul 01, 2022   262 76-year-old female with no significant cardiac history presents to ER with a chief complaint of heart palpitations, rapid heart rate that started prior to arrival.  Denies chest pain.  [OM]   0043 TODAY I, Marcia Bender D.O., PERSONALLY VISUALIZED THE PATIENT'S EKG TRACING. I AM THE PRIMARY . Rate 137, , QTc 442. Atrial fibrillation with rapid ventricular rate. No ST elevation, no STEMI. [OM]   0148 Troponin-High Sensitivity: 5 [OM]   0148 Sodium: 140 [OM]   0203 Potassium: 3.8 [OM]   0203 Chloride: 110 [OM]   0203 CO2: 23 [OM]   0203 Magnesium: 2.1 [OM]   0203 Troponin-High Sensitivity: 5 [OM]   0305 Leukocyte Esterase(!): Trace [OM]   0305 IMPRESSION     No active cardiopulmonary disease. [OM]      ED Course User Index  [OM] Olvin Bocanegra DO       Provider Notes (Medical Decision Making): HEART Score for Major Cardiac Events from [employer]  on 7/1/2022  ** All calculations should be rechecked by clinician prior to use **    RESULT SUMMARY:  4 points  Moderate Score (4-6 points)    Risk of MACE of 12-16.6%. INPUTS:  History --> 1 = Moderately suspicious  EKG --> 1 = Non-specific repolarization disturbance  Age --> 1 = 45-64  Risk factors --> 1 = 1-2 risk factors  Initial troponin --> 0 = ?normal limit    MDM       Procedures   Medical Decision Makingedical Decision Making  Performed by: Mati Torres DO  PROCEDURES:  Procedures       Disposition   Disposition: Admitted to Observation Unit the case was discussed with the admitting hospitalist Jeff Pepper NP. Admitted    DISCHARGE PLAN:  1. Current Discharge Medication List      CONTINUE these medications which have NOT CHANGED    Details   doxepin (SINEquan) 25 mg capsule Take 2 Capsules by mouth nightly. Qty: 90 Capsule, Refills: 1    Associated Diagnoses: Positive depression screening; Primary insomnia; Adjustment disorder, unspecified type      lisinopril-hydroCHLOROthiazide (PRINZIDE, ZESTORETIC) 20-12.5 mg per tablet Take one tablet daily  Qty: 90 Tablet, Refills: 3    Associated Diagnoses: Essential hypertension      !! carboxymethylcellulose-citric 0.75 gram cap Take 3 Capsules by mouth two (2) times a day.  Take 3 capsules 20-30 minutes prior to lunch and prior to dinner  Qty: 168 Capsule, Refills: 2      !! carboxymethylcellulose-citric (Plenity) 0.75 gram cap Take 3 Each by mouth Before breakfast and dinner. Qty: 84 Capsule, Refills: 2    Associated Diagnoses: Obesity (BMI 30-39.9)      butalbital-acetaminophen-caffeine (FIORICET, ESGIC) -40 mg per tablet Take 1 Tablet by mouth every six (6) hours as needed for Headache. Qty: 30 Tablet, Refills: 1    Associated Diagnoses: Migraine without aura and without status migrainosus, not intractable       !! - Potential duplicate medications found. Please discuss with provider. 2.   Follow-up Information    None       3. Return to ED if worse   4. Current Discharge Medication List            Diagnosis     Clinical Impression:   1. Atrial fibrillation with rapid ventricular response (HCC)        Attestations:    Donnie Bernal, DO    Please note that this dictation was completed with [employer], the computer voice recognition software. Quite often unanticipated grammatical, syntax, homophones, and other interpretive errors are inadvertently transcribed by the computer software. Please disregard these errors. Please excuse any errors that have escaped final proofreading. Thank you.

## 2022-07-01 NOTE — PROGRESS NOTES
Problem: Patient Education: Go to Patient Education Activity  Goal: Patient/Family Education  Outcome: Progressing Towards Goal     Problem: Afib Pathway: Day 1  Goal: Off Pathway (Use only if patient is Off Pathway)  Outcome: Progressing Towards Goal  Goal: Activity/Safety  Outcome: Progressing Towards Goal  Goal: Consults, if ordered  Outcome: Progressing Towards Goal  Goal: Diagnostic Test/Procedures  Outcome: Progressing Towards Goal  Goal: Nutrition/Diet  Outcome: Progressing Towards Goal  Goal: Discharge Planning  Outcome: Progressing Towards Goal  Goal: Medications  Outcome: Progressing Towards Goal  Goal: Respiratory  Outcome: Progressing Towards Goal  Goal: Treatments/Interventions/Procedures  Outcome: Progressing Towards Goal  Goal: Psychosocial  Outcome: Progressing Towards Goal  Goal: *Optimal pain control at patient's stated goal  Outcome: Progressing Towards Goal  Goal: *Hemodynamically stable  Outcome: Progressing Towards Goal  Goal: *Stable cardiac rhythm  Outcome: Progressing Towards Goal  Goal: *Lungs clear or at baseline  Outcome: Progressing Towards Goal  Goal: *Labs within defined limits  Outcome: Progressing Towards Goal  Goal: *Describes available resources and support systems  Outcome: Progressing Towards Goal     Problem: Afib Pathway: Day 2  Goal: Off Pathway (Use only if patient is Off Pathway)  Outcome: Progressing Towards Goal  Goal: Activity/Safety  Outcome: Progressing Towards Goal  Goal: Consults, if ordered  Outcome: Progressing Towards Goal  Goal: Diagnostic Test/Procedures  Outcome: Progressing Towards Goal  Goal: Nutrition/Diet  Outcome: Progressing Towards Goal  Goal: Discharge Planning  Outcome: Progressing Towards Goal  Goal: Medications  Outcome: Progressing Towards Goal  Goal: Respiratory  Outcome: Progressing Towards Goal  Goal: Treatments/Interventions/Procedures  Outcome: Progressing Towards Goal  Goal: Psychosocial  Outcome: Progressing Towards Goal  Goal: *Hemodynamically stable  Outcome: Progressing Towards Goal  Goal: *Optimal pain control at patient's stated goal  Outcome: Progressing Towards Goal  Goal: *Stable cardiac rhythm  Outcome: Progressing Towards Goal  Goal: *Lungs clear or at baseline  Outcome: Progressing Towards Goal  Goal: *Describes available resources and support systems  Outcome: Progressing Towards Goal     Problem: Afib Pathway: Day 3  Goal: Off Pathway (Use only if patient is Off Pathway)  Outcome: Progressing Towards Goal  Goal: Activity/Safety  Outcome: Progressing Towards Goal  Goal: Diagnostic Test/Procedures  Outcome: Progressing Towards Goal  Goal: Nutrition/Diet  Outcome: Progressing Towards Goal  Goal: Discharge Planning  Outcome: Progressing Towards Goal  Goal: Medications  Outcome: Progressing Towards Goal  Goal: Respiratory  Outcome: Progressing Towards Goal  Goal: Treatments/Interventions/Procedures  Outcome: Progressing Towards Goal  Goal: Psychosocial  Outcome: Progressing Towards Goal     Problem: Afib: Discharge Outcomes (not in CAT)  Goal: *Hemodynamically stable  Outcome: Progressing Towards Goal  Goal: *Stable cardiac rhythm  Outcome: Progressing Towards Goal  Goal: *Lungs clear or at baseline  Outcome: Progressing Towards Goal  Goal: *Optimal pain control at patient's stated goal  Outcome: Progressing Towards Goal  Goal: *Identifies cardiac risk factors  Outcome: Progressing Towards Goal  Goal: *Verbalizes home exercise program, activity guidelines, cardiac precautions  Outcome: Progressing Towards Goal  Goal: *Verbalizes understanding and describes prescribed diet  Outcome: Progressing Towards Goal  Goal: *Verbalizes understanding and describes medication purposes and frequencies  Outcome: Progressing Towards Goal  Goal: *Anxiety reduced or absent  Outcome: Progressing Towards Goal  Goal: *Understands and describes signs and symptoms to report to providers(Stroke Metric)  Outcome: Progressing Towards Goal  Goal: *Describes follow-up/return visits to physicians  Outcome: Progressing Towards Goal  Goal: *Describes available resources and support systems  Outcome: Progressing Towards Goal  Goal: *Influenza immunization  Outcome: Progressing Towards Goal  Goal: *Pneumococcal immunization  Outcome: Progressing Towards Goal  Goal: *Describes smoking cessation resources  Outcome: Progressing Towards Goal     Problem: Hypertension  Goal: *Blood pressure within specified parameters  Outcome: Progressing Towards Goal  Goal: *Fluid volume balance  Outcome: Progressing Towards Goal  Goal: *Labs within defined limits  Outcome: Progressing Towards Goal     Problem: Patient Education: Go to Patient Education Activity  Goal: Patient/Family Education  Outcome: Progressing Towards Goal

## 2022-07-01 NOTE — PROGRESS NOTES
0700- Received care of pt. Pt resting quietly. VSS. Assessment completed. No pain. 8270- AM meds given. 1015- ECHO being done. 1137- Meds given. 1422- PRN tylenol given. 1700- Pt ambulating in hallway. 1731- Meds given. 65- Sister in room.

## 2022-07-01 NOTE — PROGRESS NOTES
TRANSFER - IN REPORT:    Verbal report received from [facility] on [place] Yu  being received from ED for transfer of care. Report consisted of patients Situation, Background, Assessment and   Recommendations(SBAR). Information from the SBAR was reviewed with the receiving nurse. Opportunity for questions and clarification was provided. Assessment completed upon patients arrival to unit and care assumed.

## 2022-07-01 NOTE — H&P
History and Physical    Subjective:     Gus Caal is a 57 y.o. female  has a past medical history of Hypertension. Patient seen at bedside in emergency department. Patient presented to the emergency room with palpitations. Patient states for the last week she has had on and off feeling of racing heart rate that is caused her anxiety. No chest pain was reported. Patient got metoprolol in the emergency room and it slowed her heart rate. No leg swelling was reported. Patient be admitted to the hospitalist group on observation. Has not seen a cardiologist in the past.  Echocardiogram ordered. Past Medical History:   Diagnosis Date    Hypertension       Past Surgical History:   Procedure Laterality Date    HX KNEE REPLACEMENT Right 03/01/2020    HX OTHER SURGICAL      lap band    HX TUBAL LIGATION       Family History   Problem Relation Age of Onset    Heart Disease Father       Social History     Tobacco Use    Smoking status: Never Smoker    Smokeless tobacco: Never Used   Substance Use Topics    Alcohol use: Yes     Comment: occationally       Prior to Admission medications    Medication Sig Start Date End Date Taking? Authorizing Provider   doxepin (SINEquan) 25 mg capsule Take 2 Capsules by mouth nightly. 5/5/22  Yes Garcia GARCIA NP   lisinopril-hydroCHLOROthiazide (PRINZIDE, ZESTORETIC) 20-12.5 mg per tablet Take one tablet daily 5/5/22  Yes Alexi Reynolds NP   carboxymethylcellulose-citric 0.75 gram cap Take 3 Capsules by mouth two (2) times a day. Take 3 capsules 20-30 minutes prior to lunch and prior to dinner  Patient not taking: Reported on 7/1/2022 1/31/22   Lemmie Sinks JOSE NP   carboxymethylcellulose-citric (Plenity) 0.75 gram cap Take 3 Each by mouth Before breakfast and dinner.   Patient not taking: Reported on 7/1/2022 1/27/22   Lemmie Sinks D, NP   butalbital-acetaminophen-caffeine (FIORICET, ESGIC) -40 mg per tablet Take 1 Tablet by mouth every six (6) hours as needed for Headache. 12/16/21   Ashlie GARCIA, NP     No Known Allergies      Review of Systems   Constitutional: Negative for chills and fever. Respiratory: Negative for cough and shortness of breath. Cardiovascular: Positive for palpitations. Negative for chest pain and leg swelling. Neurological: Negative for dizziness. All other systems reviewed and are negative. Objective:   VITALS:    Visit Vitals  /74   Pulse (!) 114   Temp 97.7 °F (36.5 °C)   Resp 22   Ht 5' 3\" (1.6 m)   Wt 95.3 kg (210 lb)   SpO2 97%   BMI 37.20 kg/m²       Physical Exam  Vitals and nursing note reviewed. Constitutional:       General: She is not in acute distress. Appearance: She is well-developed. She is not ill-appearing. HENT:      Head: Normocephalic and atraumatic. Eyes:      Conjunctiva/sclera: Conjunctivae normal.      Pupils: Pupils are equal, round, and reactive to light. Cardiovascular:      Rate and Rhythm: Normal rate and regular rhythm. Pulses: Normal pulses. Heart sounds: Normal heart sounds. Pulmonary:      Effort: Pulmonary effort is normal. No respiratory distress. Breath sounds: Normal breath sounds. No stridor. Abdominal:      General: Bowel sounds are normal. There is no distension. Palpations: Abdomen is soft. There is no mass. Musculoskeletal:         General: Normal range of motion. Cervical back: Normal range of motion and neck supple. Right lower leg: No edema. Left lower leg: No edema. Skin:     General: Skin is warm and dry. Neurological:      General: No focal deficit present. Mental Status: She is alert and oriented to person, place, and time. Psychiatric:         Behavior: Behavior normal.         Thought Content:  Thought content normal.         Judgment: Judgment normal.         _______________________________________________________________________  Care Plan discussed with:    Comments Patient X    Family      RN X    Care Manager                    Consultant:      _______________________________________________________________________  Expected  Disposition:   Home with Family X   HH/PT/OT/RN    SNF/LTC    ALFA    ________________________________________________________________________  TOTAL TIME:  48 Minutes    Critical Care Provided     Minutes non procedure based      Comments    X Reviewed previous records   >50% of visit spent in counseling and coordination of care X Discussion with patient and/or family and questions answered       ________________________________________________________________________    Labs:  Recent Results (from the past 24 hour(s))   URINALYSIS W/ RFLX MICROSCOPIC    Collection Time: 07/01/22 12:05 AM   Result Value Ref Range    Color Yellow      Appearance Clear      Specific gravity 1.015 1.005 - 1.030      pH (UA) 7.5 5.0 - 8.0      Protein 30 (A) Negative mg/dL    Glucose Negative Negative mg/dL    Ketone Negative Negative mg/dL    Bilirubin Negative Negative      Blood Negative Negative      Urobilinogen 2.0 (H) 0.2 - 1.0 EU/dL    Nitrites Negative Negative      Leukocyte Esterase Trace (A) Negative     URINE MICROSCOPIC    Collection Time: 07/01/22 12:05 AM   Result Value Ref Range    WBC 5-10 0 - 4 /hpf    RBC 0-5 0 - 2 /hpf    Epithelial cells Few 0 - 20 /lpf    Bacteria Negative (A) None /hpf   EKG, 12 LEAD, INITIAL    Collection Time: 07/01/22 12:32 AM   Result Value Ref Range    Ventricular Rate 157 BPM    Atrial Rate 108 BPM    P-R Interval 43 ms    QRS Duration 86 ms    Q-T Interval 273 ms    QTC Calculation (Bezet) 442 ms    Calculated R Axis 17 degrees    Calculated T Axis 15 degrees    Diagnosis       Atrial fibrillation with rapid V-rate  ST depression, probably rate related     CBC WITH AUTOMATED DIFF    Collection Time: 07/01/22 12:45 AM   Result Value Ref Range    WBC 11.1 4.6 - 13.2 K/uL    RBC 4.45 4.20 - 5.30 M/uL    HGB 12.9 12.0 - 16.0 g/dL    HCT 38.6 35.0 - 45.0 %    MCV 86.7 78.0 - 100.0 FL    MCH 29.0 24.0 - 34.0 PG    MCHC 33.4 31.0 - 37.0 g/dL    RDW 13.2 11.6 - 14.5 %    PLATELET 228 994 - 293 K/uL    MPV 9.6 9.2 - 11.8 FL    NRBC 0.0 0.0  WBC    ABSOLUTE NRBC 0.00 0.00 - 0.01 K/uL    NEUTROPHILS 53 40 - 73 %    LYMPHOCYTES 22 21 - 52 %    MONOCYTES 4 3 - 10 %    EOSINOPHILS 2 0 - 5 %    BASOPHILS 0 0 - 2 %    OTHER CELL 19 %    IMMATURE GRANULOCYTES 0 %    ABS. NEUTROPHILS 5.9 1.8 - 8.0 K/UL    ABS. LYMPHOCYTES 2.4 0.9 - 3.6 K/UL    ABS. MONOCYTES 0.4 0.05 - 1.2 K/UL    ABS. EOSINOPHILS 0.2 0.0 - 0.4 K/UL    ABS. BASOPHILS 0.0 0.0 - 0.1 K/UL    ABS. IMM. GRANS. 0.0 K/UL    DF AUTOMATED      RBC COMMENTS Normocytic, Normochromic     PROTHROMBIN TIME + INR    Collection Time: 07/01/22 12:45 AM   Result Value Ref Range    Prothrombin time 11.9 11.5 - 15.2 sec    INR 0.8 0.8 - 1.2     METABOLIC PANEL, COMPREHENSIVE    Collection Time: 07/01/22 12:45 AM   Result Value Ref Range    Sodium 140 136 - 145 mmol/L    Potassium 3.8 3.5 - 5.5 mmol/L    Chloride 110 100 - 111 mmol/L    CO2 23 21 - 32 mmol/L    Anion gap 7 3.0 - 18.0 mmol/L    Glucose 154 (H) 74 - 99 mg/dL    BUN 38 (H) 7 - 18 mg/dL    Creatinine 1.20 0.60 - 1.30 mg/dL    BUN/Creatinine ratio 32 (H) 12 - 20      GFR est AA 55 (L) >60 ml/min/1.73m2    GFR est non-AA 45 (L) >60 ml/min/1.73m2    Calcium 9.4 8.5 - 10.1 mg/dL    Bilirubin, total 0.3 0.2 - 1.0 mg/dL    AST (SGOT) 18 10 - 38 U/L    ALT (SGPT) 21 13 - 56 U/L    Alk.  phosphatase 162 (H) 45 - 117 U/L    Protein, total 7.5 6.4 - 8.2 g/dL    Albumin 3.5 3.4 - 5.0 g/dL    Globulin 4.0 2.0 - 4.0 g/dL    A-G Ratio 0.9 0.8 - 1.7     NT-PRO BNP    Collection Time: 07/01/22 12:45 AM   Result Value Ref Range    NT pro-BNP 39 0 - 900 pg/mL   TROPONIN-HIGH SENSITIVITY    Collection Time: 07/01/22 12:45 AM   Result Value Ref Range    Troponin-High Sensitivity 5 0 - 54 ng/L   MAGNESIUM    Collection Time: 07/01/22 12:45 AM   Result Value Ref Range    Magnesium 2.1 1.6 - 2.6 mg/dL       Imaging:  XR CHEST PORT    Result Date: 7/1/2022  EXAM: CHEST RADIOGRAPH CLINICAL INDICATION/HISTORY: palpitations, a-fib -Additional: None COMPARISON: 2/19/2020 TECHNIQUE: Portable frontal view of the chest _______________ FINDINGS: SUPPORT DEVICES: None. HEART AND MEDIASTINUM: No appreciable cardiomegaly. Remaining mediastinal contours within normal limits. LUNGS AND PLEURAL SPACES: Dense nodule in the left upper lobe, consistent with a calcified granuloma. Otherwise clear. BONY THORAX AND SOFT TISSUES: Unremarkable. _______________     No active cardiopulmonary disease. Assessment & Plan:       Atrial fibrillation with RVR (HCC)  Palpitations on and off for the past month  RVR tonight rate lowered with Lopressor  Cardiology consult  Cardiac monitoring  No chest pain reported  First troponin negative will repeat    Essential hypertension  This is a chronic problem  Continue lisinopril/hydrochlorothiazide        Code Status: Full      Prophylaxis: Lovenox      Electronically Signed : Aracely Reynaga ENP-C, FNP-C, [address] voice recognition was used to generate this report, which may have resulted in some phonetic based errors in grammar and contents.  Even though attempts were made to correct all the mistakes, some may have been missed, and remained in the body of the document

## 2022-07-01 NOTE — MANAGEMENT PLAN
Patient examined and interviewed independently. Currently atrial fibrillation with new diagnosis is rate controlled. Started on short acting Cardizem and Eliquis due to elevated [id number] 2 score noted. Likely be able to discharge home in a.m. after converting to long-acting Cardizem CD and Eliquis Atrial fibrillation continues to be rate controlled. Patient already has scheduled appointment for follow-up with [facility] cardiology.

## 2022-07-01 NOTE — ROUTINE PROCESS
TRANSFER - OUT REPORT:    Verbal report given to [facility] on Richa Gutierrez  being transferred to [address] [place] [address] for routine progression of care       Report consisted of patients Situation, Background, Assessment and   Recommendations(SBAR). Information from the following report(s) SBAR and ED Summary was reviewed with the receiving nurse. Lines:   Saline Lock 07/01/22 Anterior; Left Wrist (Active)        Opportunity for questions and clarification was provided.       Patient transported with:   Monitor  Registered Nurse

## 2022-07-01 NOTE — ED TRIAGE NOTES
Reports around 2100 feeling as if heart was racing and blood pressure was elevated. Denies SOB, nausea or diaphoresis. No other symptoms did feel anxious. Denies drinking energy drinks, does admit to drinking diet Sun-Drops.  Did drink 2 today

## 2022-07-02 VITALS
TEMPERATURE: 97.5 F | RESPIRATION RATE: 14 BRPM | HEART RATE: 78 BPM | SYSTOLIC BLOOD PRESSURE: 121 MMHG | BODY MASS INDEX: 37.21 KG/M2 | WEIGHT: 210 LBS | DIASTOLIC BLOOD PRESSURE: 62 MMHG | OXYGEN SATURATION: 97 % | HEIGHT: 63 IN

## 2022-07-02 LAB
ALBUMIN SERPL-MCNC: 2.7 G/DL (ref 3.4–5)
ALBUMIN/GLOB SERPL: 0.8 {RATIO} (ref 0.8–1.7)
ALP SERPL-CCNC: 114 U/L (ref 45–117)
ALT SERPL-CCNC: 17 U/L (ref 13–56)
ANION GAP SERPL CALC-SCNC: 9 MMOL/L (ref 3–18)
AST SERPL W P-5'-P-CCNC: 12 U/L (ref 10–38)
BASOPHILS # BLD: 0.1 K/UL (ref 0–0.1)
BASOPHILS NFR BLD: 1 % (ref 0–2)
BILIRUB SERPL-MCNC: 0.3 MG/DL (ref 0.2–1)
BUN SERPL-MCNC: 34 MG/DL (ref 7–18)
BUN/CREAT SERPL: 36 (ref 12–20)
CA-I BLD-MCNC: 8.6 MG/DL (ref 8.5–10.1)
CHLORIDE SERPL-SCNC: 112 MMOL/L (ref 100–111)
CO2 SERPL-SCNC: 20 MMOL/L (ref 21–32)
CREAT SERPL-MCNC: 0.95 MG/DL (ref 0.6–1.3)
DIFFERENTIAL METHOD BLD: ABNORMAL
EOSINOPHIL # BLD: 0.3 K/UL (ref 0–0.4)
EOSINOPHIL NFR BLD: 4 % (ref 0–5)
ERYTHROCYTE [DISTWIDTH] IN BLOOD BY AUTOMATED COUNT: 13.4 % (ref 11.6–14.5)
GLOBULIN SER CALC-MCNC: 3.2 G/DL (ref 2–4)
GLUCOSE SERPL-MCNC: 112 MG/DL (ref 74–99)
HCT VFR BLD AUTO: 33.9 % (ref 35–45)
HGB BLD-MCNC: 10.7 G/DL (ref 12–16)
IMM GRANULOCYTES # BLD AUTO: 0 K/UL (ref 0–0.04)
IMM GRANULOCYTES NFR BLD AUTO: 0 % (ref 0–0.5)
LYMPHOCYTES # BLD: 3.5 K/UL (ref 0.9–3.6)
LYMPHOCYTES NFR BLD: 43 % (ref 21–52)
MAGNESIUM SERPL-MCNC: 2.1 MG/DL (ref 1.6–2.6)
MCH RBC QN AUTO: 28 PG (ref 24–34)
MCHC RBC AUTO-ENTMCNC: 31.6 G/DL (ref 31–37)
MCV RBC AUTO: 88.7 FL (ref 78–100)
MONOCYTES # BLD: 0.8 K/UL (ref 0.05–1.2)
MONOCYTES NFR BLD: 11 % (ref 3–10)
NEUTS SEG # BLD: 3.2 K/UL (ref 1.8–8)
NEUTS SEG NFR BLD: 41 % (ref 40–73)
NRBC # BLD: 0 K/UL (ref 0–0.01)
NRBC BLD-RTO: 0 PER 100 WBC
PLATELET # BLD AUTO: 287 K/UL (ref 135–420)
PMV BLD AUTO: 9.6 FL (ref 9.2–11.8)
POTASSIUM SERPL-SCNC: 3.9 MMOL/L (ref 3.5–5.5)
PROT SERPL-MCNC: 5.9 G/DL (ref 6.4–8.2)
RBC # BLD AUTO: 3.82 M/UL (ref 4.2–5.3)
SODIUM SERPL-SCNC: 141 MMOL/L (ref 136–145)
WBC # BLD AUTO: 7.8 K/UL (ref 4.6–13.2)

## 2022-07-02 PROCEDURE — 80053 COMPREHEN METABOLIC PANEL: CPT

## 2022-07-02 PROCEDURE — 85025 COMPLETE CBC W/AUTO DIFF WBC: CPT

## 2022-07-02 PROCEDURE — 74011250637 HC RX REV CODE- 250/637: Performed by: NURSE PRACTITIONER

## 2022-07-02 PROCEDURE — 74011000250 HC RX REV CODE- 250: Performed by: NURSE PRACTITIONER

## 2022-07-02 PROCEDURE — 83735 ASSAY OF MAGNESIUM: CPT

## 2022-07-02 PROCEDURE — G0378 HOSPITAL OBSERVATION PER HR: HCPCS

## 2022-07-02 PROCEDURE — 36415 COLL VENOUS BLD VENIPUNCTURE: CPT

## 2022-07-02 RX ORDER — DILTIAZEM HYDROCHLORIDE 120 MG/1
120 CAPSULE, COATED, EXTENDED RELEASE ORAL DAILY
Qty: 30 CAPSULE | Refills: 0 | Status: SHIPPED | OUTPATIENT
Start: 2022-07-02

## 2022-07-02 RX ORDER — LISINOPRIL 10 MG/1
10 TABLET ORAL DAILY
Qty: 30 TABLET | Refills: 0 | Status: SHIPPED | OUTPATIENT
Start: 2022-07-02 | End: 2022-09-09

## 2022-07-02 RX ADMIN — ACETAMINOPHEN 650 MG: 325 TABLET ORAL at 05:33

## 2022-07-02 RX ADMIN — APIXABAN 5 MG: 5 TABLET, FILM COATED ORAL at 08:32

## 2022-07-02 RX ADMIN — SODIUM CHLORIDE, PRESERVATIVE FREE 10 ML: 5 INJECTION INTRAVENOUS at 05:35

## 2022-07-02 RX ADMIN — LISINOPRIL 20 MG: 20 TABLET ORAL at 08:32

## 2022-07-02 NOTE — PROGRESS NOTES
Problem: Patient Education: Go to Patient Education Activity  Goal: Patient/Family Education  7/2/2022 0936 by Rosanna Leyden, LPN  Outcome: Resolved/Met  7/2/2022 0935 by Rosanna Leyden, LPN  Outcome: Progressing Towards Goal     Problem: Afib Pathway: Day 1  Goal: Off Pathway (Use only if patient is Off Pathway)  7/2/2022 0936 by Rosanna Leyden, LPN  Outcome: Resolved/Met  7/2/2022 0935 by Rosanna Leyden, LPN  Outcome: Progressing Towards Goal  Goal: Activity/Safety  7/2/2022 0936 by Rosanna Leyden, LPN  Outcome: Resolved/Met  7/2/2022 0935 by Rosanna Leyden, LPN  Outcome: Progressing Towards Goal  Goal: Consults, if ordered  7/2/2022 0936 by Rosanna Leyden, LPN  Outcome: Resolved/Met  7/2/2022 0935 by Rosanna Leyden, LPN  Outcome: Progressing Towards Goal  Goal: Diagnostic Test/Procedures  7/2/2022 0936 by Rosanna Leyden, LPN  Outcome: Resolved/Met  7/2/2022 0935 by Rosanna Leyden, LPN  Outcome: Progressing Towards Goal  Goal: Nutrition/Diet  7/2/2022 0936 by Rosanna Leyden, LPN  Outcome: Resolved/Met  7/2/2022 0935 by Rosanna Leyden, LPN  Outcome: Progressing Towards Goal  Goal: Discharge Planning  7/2/2022 0936 by Rosanna Leyden, LPN  Outcome: Resolved/Met  7/2/2022 0935 by Rosanna Leyden, LPN  Outcome: Progressing Towards Goal  Goal: Medications  7/2/2022 0936 by Rosanna Leyden, LPN  Outcome: Resolved/Met  7/2/2022 0935 by Rosanna Leyden, LPN  Outcome: Progressing Towards Goal  Goal: Respiratory  7/2/2022 0936 by Rosanna Leyden, LPN  Outcome: Resolved/Met  7/2/2022 0935 by Rosanna Leyden, LPN  Outcome: Progressing Towards Goal  Goal: Treatments/Interventions/Procedures  7/2/2022 0936 by Rosanna Leyden, LPN  Outcome: Resolved/Met  7/2/2022 0935 by Rosanna Leyden, LPN  Outcome: Progressing Towards Goal  Goal: Psychosocial  7/2/2022 0936 by Rosanna Leyden, LPN  Outcome: Resolved/Met  7/2/2022 0935 by Rosanna Leyden, LPN  Outcome: Progressing Towards Goal  Goal: *Optimal pain control at patient's stated goal  7/2/2022 0936 by Emily Aragon, LPN  Outcome: Resolved/Met  7/2/2022 0935 by Emily Aragon, LPN  Outcome: Progressing Towards Goal  Goal: *Hemodynamically stable  7/2/2022 0936 by Emily Aragon, LPN  Outcome: Resolved/Met  7/2/2022 0935 by Emily Aragon, LPN  Outcome: Progressing Towards Goal  Goal: *Stable cardiac rhythm  7/2/2022 0936 by Emily Aragon, LPN  Outcome: Resolved/Met  7/2/2022 0935 by Emily Aragon, LPN  Outcome: Progressing Towards Goal  Goal: *Lungs clear or at baseline  7/2/2022 0936 by Emily Aragon, LPN  Outcome: Resolved/Met  7/2/2022 0935 by Emily Aragon, LPN  Outcome: Progressing Towards Goal  Goal: *Labs within defined limits  7/2/2022 0936 by Emily Aragon, LPN  Outcome: Resolved/Met  7/2/2022 0935 by Emily Aragon, LPN  Outcome: Progressing Towards Goal  Goal: *Describes available resources and support systems  7/2/2022 0936 by Emily Aragon, LPN  Outcome: Resolved/Met  7/2/2022 0935 by Emily Aragon, LPN  Outcome: Progressing Towards Goal     Problem: Afib Pathway: Day 2  Goal: Off Pathway (Use only if patient is Off Pathway)  7/2/2022 0936 by Emily Aragon, LPN  Outcome: Resolved/Met  7/2/2022 0935 by Emily Aragon, LPN  Outcome: Progressing Towards Goal  Goal: Activity/Safety  7/2/2022 0936 by Emily Aragon, LPN  Outcome: Resolved/Met  7/2/2022 0935 by Emily Aragon, LPN  Outcome: Progressing Towards Goal  Goal: Consults, if ordered  7/2/2022 0936 by Emily Aragon, LPN  Outcome: Resolved/Met  7/2/2022 0935 by Emily Aragon, LPN  Outcome: Progressing Towards Goal  Goal: Diagnostic Test/Procedures  7/2/2022 0936 by Emily Aragon, LPN  Outcome: Resolved/Met  7/2/2022 0935 by Emily Aragon, LPN  Outcome: Progressing Towards Goal  Goal: Nutrition/Diet  7/2/2022 0936 by Emily Aragon LPN  Outcome: Resolved/Met  7/2/2022 0935 by Emily Aragon LPN  Outcome: Progressing Towards Goal  Goal: Discharge Planning  7/2/2022 3980 by Ida Castañeda, LPN  Outcome: Resolved/Met  7/2/2022 0935 by Ida Castañeda LPN  Outcome: Progressing Towards Goal  Goal: Medications  7/2/2022 0936 by Ida Castañeda, LPN  Outcome: Resolved/Met  7/2/2022 0935 by Ida Castañeda LPN  Outcome: Progressing Towards Goal  Goal: Respiratory  7/2/2022 0936 by Ida Castañeda, LPN  Outcome: Resolved/Met  7/2/2022 0935 by Ida Castañeda LPN  Outcome: Progressing Towards Goal  Goal: Treatments/Interventions/Procedures  7/2/2022 0936 by Ida Castañeda LPN  Outcome: Resolved/Met  7/2/2022 0935 by Ida Castañeda LPN  Outcome: Progressing Towards Goal  Goal: Psychosocial  7/2/2022 0936 by Ida Castañeda, LPN  Outcome: Resolved/Met  7/2/2022 0935 by Ida Castañeda LPN  Outcome: Progressing Towards Goal  Goal: *Hemodynamically stable  7/2/2022 0936 by Ida Castañeda LPN  Outcome: Resolved/Met  7/2/2022 0935 by Ida Castañeda LPN  Outcome: Progressing Towards Goal  Goal: *Optimal pain control at patient's stated goal  7/2/2022 0936 by Ida Castañeda LPN  Outcome: Resolved/Met  7/2/2022 0935 by Ida Castañeda LPN  Outcome: Progressing Towards Goal  Goal: *Stable cardiac rhythm  7/2/2022 0936 by Ida Castañeda LPN  Outcome: Resolved/Met  7/2/2022 0935 by Ida Castañeda LPN  Outcome: Progressing Towards Goal  Goal: *Lungs clear or at baseline  7/2/2022 0936 by Ida Castañeda LPN  Outcome: Resolved/Met  7/2/2022 0935 by Ida Castañeda LPN  Outcome: Progressing Towards Goal  Goal: *Describes available resources and support systems  7/2/2022 0936 by Ida Castañeda LPN  Outcome: Resolved/Met  7/2/2022 0935 by Ida Castañeda LPN  Outcome: Progressing Towards Goal     Problem: Afib Pathway: Day 3  Goal: Off Pathway (Use only if patient is Off Pathway)  7/2/2022 0936 by Ida Castañeda LPN  Outcome: Resolved/Met  7/2/2022 0935 by Ida Castañeda LPN  Outcome: Progressing Towards Goal  Goal: Activity/Safety  7/2/2022 0936 by Ida Castañeda LPN  Outcome: Resolved/Met  7/2/2022 0935 by Emily Aragon, LPN  Outcome: Progressing Towards Goal  Goal: Diagnostic Test/Procedures  7/2/2022 0936 by Emily Aragon, LPN  Outcome: Resolved/Met  7/2/2022 0935 by Emily Aragon, LPN  Outcome: Progressing Towards Goal  Goal: Nutrition/Diet  7/2/2022 0936 by Emily Aragon, LPN  Outcome: Resolved/Met  7/2/2022 0935 by Emily Aragon, LPN  Outcome: Progressing Towards Goal  Goal: Discharge Planning  7/2/2022 0936 by Emily Aragon, LPN  Outcome: Resolved/Met  7/2/2022 0935 by Emily Aragon, LPN  Outcome: Progressing Towards Goal  Goal: Medications  7/2/2022 0936 by Emily Aragon, LPN  Outcome: Resolved/Met  7/2/2022 0935 by Emily Aragon, LPN  Outcome: Progressing Towards Goal  Goal: Respiratory  7/2/2022 0936 by Emily Aragon, LPN  Outcome: Resolved/Met  7/2/2022 0935 by Emily Aragon, LPN  Outcome: Progressing Towards Goal  Goal: Treatments/Interventions/Procedures  7/2/2022 0936 by Emily Aragon, LPN  Outcome: Resolved/Met  7/2/2022 0935 by Emily Aragon, LPN  Outcome: Progressing Towards Goal  Goal: Psychosocial  7/2/2022 0936 by Emily Aragon, LPN  Outcome: Resolved/Met  7/2/2022 0935 by Emily Aragon, LPN  Outcome: Progressing Towards Goal     Problem: Afib: Discharge Outcomes (not in CAT)  Goal: *Hemodynamically stable  7/2/2022 0936 by Emily Aragon, LPN  Outcome: Resolved/Met  7/2/2022 0935 by Emily Aragon, LPN  Outcome: Progressing Towards Goal  Goal: *Stable cardiac rhythm  7/2/2022 0936 by Emily Aragon, LPN  Outcome: Resolved/Met  7/2/2022 0935 by Emily Aragon, LPN  Outcome: Progressing Towards Goal  Goal: *Lungs clear or at baseline  7/2/2022 0936 by Emily Aragon, LPN  Outcome: Resolved/Met  7/2/2022 0935 by Emily Aragon, LPN  Outcome: Progressing Towards Goal  Goal: *Optimal pain control at patient's stated goal  7/2/2022 0936 by Emily Aragon LPN  Outcome: Resolved/Met  7/2/2022 0935 by Emily Aragon LPN  Outcome: Progressing Towards Goal  Goal: *Identifies cardiac risk factors  7/2/2022 0936 by Aden Wilde LPN  Outcome: Resolved/Met  7/2/2022 0935 by Aden Wilde LPN  Outcome: Progressing Towards Goal  Goal: *Verbalizes home exercise program, activity guidelines, cardiac precautions  7/2/2022 0936 by Aden Wilde LPN  Outcome: Resolved/Met  7/2/2022 0935 by Aden Wilde LPN  Outcome: Progressing Towards Goal  Goal: *Verbalizes understanding and describes prescribed diet  7/2/2022 0936 by Aden Wilde LPN  Outcome: Resolved/Met  7/2/2022 0935 by Aden Wilde LPN  Outcome: Progressing Towards Goal  Goal: *Verbalizes understanding and describes medication purposes and frequencies  7/2/2022 0936 by Aden Wilde LPN  Outcome: Resolved/Met  7/2/2022 0935 by Aden Wilde LPN  Outcome: Progressing Towards Goal  Goal: *Anxiety reduced or absent  7/2/2022 0936 by Aden Wilde LPN  Outcome: Resolved/Met  7/2/2022 0935 by Aden Wilde LPN  Outcome: Progressing Towards Goal  Goal: *Understands and describes signs and symptoms to report to providers(Stroke Metric)  7/2/2022 0936 by Aden Wilde LPN  Outcome: Resolved/Met  7/2/2022 0935 by Aden Wilde LPN  Outcome: Progressing Towards Goal  Goal: *Describes follow-up/return visits to physicians  7/2/2022 0936 by Aden Wilde LPN  Outcome: Resolved/Met  7/2/2022 0935 by Aden Wilde LPN  Outcome: Progressing Towards Goal  Goal: *Describes available resources and support systems  7/2/2022 0936 by Aden Wilde LPN  Outcome: Resolved/Met  7/2/2022 0935 by Aden Wilde LPN  Outcome: Progressing Towards Goal  Goal: *Influenza immunization  7/2/2022 0936 by Aden Wilde LPN  Outcome: Resolved/Met  7/2/2022 0935 by Aden Wilde LPN  Outcome: Progressing Towards Goal  Goal: *Pneumococcal immunization  7/2/2022 0936 by Aden Wilde LPN  Outcome: Resolved/Met  7/2/2022 0935 by Aden Wilde LPN  Outcome: Progressing Towards Goal  Goal: *Describes smoking cessation resources  7/2/2022 0936 by Singh Gomez LPN  Outcome: Resolved/Met  7/2/2022 0935 by Singh Gomez LPN  Outcome: Progressing Towards Goal     Problem: Hypertension  Goal: *Blood pressure within specified parameters  7/2/2022 0936 by Singh Gomez LPN  Outcome: Resolved/Met  7/2/2022 0935 by Singh Gomez LPN  Outcome: Progressing Towards Goal  Goal: *Fluid volume balance  7/2/2022 0936 by Singh Gomez LPN  Outcome: Resolved/Met  7/2/2022 0935 by Singh Gomez LPN  Outcome: Progressing Towards Goal  Goal: *Labs within defined limits  7/2/2022 0936 by Singh Gomez LPN  Outcome: Resolved/Met  7/2/2022 0935 by Singh Gomez LPN  Outcome: Progressing Towards Goal     Problem: Patient Education: Go to Patient Education Activity  Goal: Patient/Family Education  7/2/2022 0936 by Singh Gomez LPN  Outcome: Resolved/Met  7/2/2022 0935 by Singh Gomez LPN  Outcome: Progressing Towards Goal

## 2022-07-02 NOTE — DISCHARGE INSTRUCTIONS

## 2022-07-02 NOTE — PROGRESS NOTES
Problem: Afib Pathway: Day 1  Goal: Activity/Safety  Outcome: Progressing Towards Goal  Goal: Consults, if ordered  Outcome: Progressing Towards Goal  Goal: Diagnostic Test/Procedures  Outcome: Progressing Towards Goal  Goal: Nutrition/Diet  Outcome: Progressing Towards Goal  Goal: Medications  Outcome: Progressing Towards Goal  Goal: Respiratory  Outcome: Progressing Towards Goal  Goal: Treatments/Interventions/Procedures  Outcome: Progressing Towards Goal  Goal: Psychosocial  Outcome: Progressing Towards Goal  Goal: *Optimal pain control at patient's stated goal  Outcome: Progressing Towards Goal     Problem: Afib Pathway: Day 1  Goal: *Hemodynamically stable  Outcome: Not Progressing Towards Goal

## 2022-07-02 NOTE — PROGRESS NOTES
@6971 Received care of pt from off going nurse. @0586 PM Assessment completed. A&OX4. Lungs clear, SATS 96% on RA. Skin warm and dry. Pt c/o \"aching HA. \"  Pt rated pain @ \"4\" on pain scale. @1396 Tylenol 650 mg po given. @2031 Pt rated pain @\"1\" on pain scale. @5416 Bedside shift report given to oncoming nurse.

## 2022-07-02 NOTE — PROGRESS NOTES
1152- assumed care of patient after receiving report from off going nurse. 0800- AM assessment complete. AM medications given without problems. Pt has a slight headache but had episodes of hypotension overnight and cardizem was held. NSR on monitor. VSS.     [id number] IV removed and tele box removed, Dr Merlin Frias came to see patient    21 - pt discharged to home with all belongings with patient. She is in stable condition.

## 2022-07-02 NOTE — DISCHARGE SUMMARY
Discharge Summary     Patient: Jacqueline Farooq MRN: [id number]  SSN: [id number]    YOB: 1960  Age: 57 y.o. Sex: female       Admit Date: 7/1/2022    Discharge Date: 7/2/2022      Admission Diagnoses: Atrial fibrillation with RVR [facility]) [I48.91]    Discharge Diagnoses:   Problem List as of 7/2/2022 Date Reviewed: 6/16/2022          Codes Class Noted - Resolved    Atrial fibrillation with RVR ([facility] ICD-10-CM: I48.91  ICD-9-CM: 427.31  7/1/2022 - Present        TIA (transient ischemic attack) ICD-10-CM: G45.9  ICD-9-CM: 435.9  10/14/2021 - Present        Panic attack ICD-10-CM: F41.0  ICD-9-CM: 300.01  1/18/2021 - Present        Bariatric surgery status ICD-10-CM: Z98.84  ICD-9-CM: V45.86  1/18/2021 - Present    Overview Signed 1/18/2021  3:17 PM by Geovanny Irizarry MD     2010             Abnormal laboratory test ICD-10-CM: R89.9  ICD-9-CM: 796.4  1/18/2021 - Present        Purpura ([facility].) ICD-10-CM: M57.8  ICD-9-CM: 287.2  11/24/2020 - Present        Essential hypertension ICD-10-CM: I10  ICD-9-CM: 401.9  11/24/2020 - Present        Elevated blood sugar ICD-10-CM: R73.9  ICD-9-CM: 790.29  6/13/2011 - Present        Fitting and adjustment of gastric lap band ICD-10-CM: Z46.51  ICD-9-CM: V53.51  11/24/2010 - Present        GERD (gastroesophageal reflux disease) ICD-10-CM: K21.9  ICD-9-CM: 530.81  3/10/2010 - Present        Obesity (BMI 30-39. 9) ICD-10-CM: E66.9  ICD-9-CM: 278.00  12/7/2009 - Present        LAP-BAND surgery status ICD-10-CM: Z98.84  ICD-9-CM: V45.86  10/7/2009 - Present        Osteoarthrosis, unspecified whether generalized or localized, unspecified site ICD-10-CM: M19.90  ICD-9-CM: 715.90  5/5/2009 - Present               Discharge Condition: Good    Hospital Course:     Jacqueline Farooq is a 57 y.o. female  has a past medical history of Hypertension. Patient seen at bedside in emergency department. Patient presented to the emergency room with palpitations.   Patient states for the last week she has had on and off feeling of racing heart rate that is caused her anxiety. No chest pain was reported. Patient got metoprolol in the emergency room and it slowed her heart rate. No leg swelling was reported. Patient be admitted to the hospitalist group on observation. Has not seen a cardiologist in the past.  Echocardiogram ordered which showed EF of 59%. Rate was converted to sinus rhythm after being given PO Cardizem. No bleeding complications while being started on eliquis. She had some low blood pressures and lisinopril was decreased to 10 mg daily. She will follow-up with cardiology as outpatient    Physical exam:    Visit Vitals  /62 (BP 1 Location: Left upper arm, BP Patient Position: At rest)   Pulse 78   Temp 97.5 °F (36.4 °C)   Resp 14   Ht 5' 3\" (1.6 m)   Wt 95.3 kg (210 lb)   SpO2 97%   Breastfeeding No   BMI 37.20 kg/m²     Gen: NAD  Heart: RRR  Lungs: Clear bilaterally  Ext: No edema    Consults: Cardiology    Significant Diagnostic Studies:   Echo:   Left Ventricle: Normal left ventricular systolic function. EF by 2D Simpsons Biplane is 59%. Left ventricle size is normal. Normal wall thickness. Normal wall motion. Diastolic function indeterminate in the setting of atrial fibrillation.   Aortic Valve: Tricuspid valve. Mildly thickened cusp.   Tricuspid Valve: Normal RVSP. The estimated RVSP is 25 mmHg.             Disposition: home    Discharge Medications:   Current Discharge Medication List      START taking these medications    Details   apixaban (ELIQUIS) 5 mg tablet Take 1 Tablet by mouth every twelve (12) hours. Qty: 60 Tablet, Refills: 0      dilTIAZem ER (Cardizem CD) 120 mg capsule Take 1 Capsule by mouth daily. Qty: 30 Capsule, Refills: 0      lisinopriL (PRINIVIL, ZESTRIL) 10 mg tablet Take 1 Tablet by mouth daily.   Qty: 30 Tablet, Refills: 0         CONTINUE these medications which have NOT CHANGED    Details   doxepin (SINEquan) 25 mg capsule Take 2 Capsules by mouth nightly. Qty: 90 Capsule, Refills: 1    Associated Diagnoses: Positive depression screening; Primary insomnia; Adjustment disorder, unspecified type         STOP taking these medications       lisinopril-hydroCHLOROthiazide (PRINZIDE, ZESTORETIC) 20-12.5 mg per tablet Comments:   Reason for Stopping:         carboxymethylcellulose-citric 0.75 gram cap Comments:   Reason for Stopping:         carboxymethylcellulose-citric (Plenity) 0.75 gram cap Comments:   Reason for Stopping:         butalbital-acetaminophen-caffeine (FIORICET, ESGIC) -40 mg per tablet Comments:   Reason for Stopping:               Activity: Activity as tolerated  Diet: Regular Diet  Wound Care: None needed    Total time spent on discharge: >30 minutes    Follow-up Appointments   Procedures    FOLLOW UP VISIT Appointment in: One Week     Standing Status:   Standing     Number of Occurrences:   1     Order Specific Question:   Appointment in     Answer:    One Week       Signed By: Jose Juan Calvert MD     July 2, 2022

## 2022-07-10 ENCOUNTER — HOSPITAL ENCOUNTER (EMERGENCY)
Age: 62
Discharge: HOME | End: 2022-07-10
Attending: EMERGENCY MEDICINE
Payer: COMMERCIAL

## 2022-07-10 VITALS
HEART RATE: 68 BPM | OXYGEN SATURATION: 99 % | WEIGHT: 209 LBS | DIASTOLIC BLOOD PRESSURE: 68 MMHG | SYSTOLIC BLOOD PRESSURE: 143 MMHG | HEIGHT: 63 IN | TEMPERATURE: 97.7 F | RESPIRATION RATE: 18 BRPM | BODY MASS INDEX: 37.03 KG/M2

## 2022-07-10 DIAGNOSIS — R00.0 TACHYCARDIA: Primary | ICD-10-CM

## 2022-07-10 LAB
ALBUMIN SERPL-MCNC: 3.1 G/DL (ref 3.4–5)
ALBUMIN/GLOB SERPL: 0.8 {RATIO} (ref 0.8–1.7)
ALP SERPL-CCNC: 125 U/L (ref 45–117)
ALT SERPL-CCNC: 20 U/L (ref 13–56)
ANION GAP SERPL CALC-SCNC: 5 MMOL/L (ref 3–18)
AST SERPL W P-5'-P-CCNC: 21 U/L (ref 10–38)
BASOPHILS # BLD: 0.1 K/UL (ref 0–0.1)
BASOPHILS NFR BLD: 1 % (ref 0–2)
BILIRUB SERPL-MCNC: 0.3 MG/DL (ref 0.2–1)
BUN SERPL-MCNC: 25 MG/DL (ref 7–18)
BUN/CREAT SERPL: 26 (ref 12–20)
CA-I BLD-MCNC: 8.8 MG/DL (ref 8.5–10.1)
CHLORIDE SERPL-SCNC: 109 MMOL/L (ref 100–111)
CO2 SERPL-SCNC: 26 MMOL/L (ref 21–32)
CREAT SERPL-MCNC: 0.96 MG/DL (ref 0.6–1.3)
DIFFERENTIAL METHOD BLD: ABNORMAL
EOSINOPHIL # BLD: 0.2 K/UL (ref 0–0.4)
EOSINOPHIL NFR BLD: 2 % (ref 0–5)
ERYTHROCYTE [DISTWIDTH] IN BLOOD BY AUTOMATED COUNT: 13.2 % (ref 11.6–14.5)
GLOBULIN SER CALC-MCNC: 3.9 G/DL (ref 2–4)
GLUCOSE SERPL-MCNC: 118 MG/DL (ref 74–99)
HCT VFR BLD AUTO: 34.8 % (ref 35–45)
HGB BLD-MCNC: 11.5 G/DL (ref 12–16)
IMM GRANULOCYTES # BLD AUTO: 0 K/UL (ref 0–0.04)
IMM GRANULOCYTES NFR BLD AUTO: 0 % (ref 0–0.5)
LYMPHOCYTES # BLD: 3.6 K/UL (ref 0.9–3.6)
LYMPHOCYTES NFR BLD: 40 % (ref 21–52)
MAGNESIUM SERPL-MCNC: 2.3 MG/DL (ref 1.6–2.6)
MCH RBC QN AUTO: 28.9 PG (ref 24–34)
MCHC RBC AUTO-ENTMCNC: 33 G/DL (ref 31–37)
MCV RBC AUTO: 87.4 FL (ref 78–100)
MONOCYTES # BLD: 0.6 K/UL (ref 0.05–1.2)
MONOCYTES NFR BLD: 7 % (ref 3–10)
NEUTS SEG # BLD: 4.6 K/UL (ref 1.8–8)
NEUTS SEG NFR BLD: 50 % (ref 40–73)
NRBC # BLD: 0 K/UL (ref 0–0.01)
NRBC BLD-RTO: 0 PER 100 WBC
PLATELET # BLD AUTO: 279 K/UL (ref 135–420)
PMV BLD AUTO: 9.5 FL (ref 9.2–11.8)
POTASSIUM SERPL-SCNC: 3.4 MMOL/L (ref 3.5–5.5)
PROT SERPL-MCNC: 7 G/DL (ref 6.4–8.2)
RBC # BLD AUTO: 3.98 M/UL (ref 4.2–5.3)
SODIUM SERPL-SCNC: 140 MMOL/L (ref 136–145)
TROPONIN-HIGH SENSITIVITY: 5 NG/L (ref 0–54)
TROPONIN-HIGH SENSITIVITY: 8 NG/L (ref 0–54)
TSH SERPL DL<=0.05 MIU/L-ACNC: 0.49 UIU/ML (ref 0.36–3.74)
WBC # BLD AUTO: 9 K/UL (ref 4.6–13.2)

## 2022-07-10 PROCEDURE — 84484 ASSAY OF TROPONIN QUANT: CPT

## 2022-07-10 PROCEDURE — 83735 ASSAY OF MAGNESIUM: CPT

## 2022-07-10 PROCEDURE — 93005 ELECTROCARDIOGRAM TRACING: CPT

## 2022-07-10 PROCEDURE — 85025 COMPLETE CBC W/AUTO DIFF WBC: CPT

## 2022-07-10 PROCEDURE — 80053 COMPREHEN METABOLIC PANEL: CPT

## 2022-07-10 PROCEDURE — 84439 ASSAY OF FREE THYROXINE: CPT

## 2022-07-10 PROCEDURE — 96360 HYDRATION IV INFUSION INIT: CPT

## 2022-07-10 PROCEDURE — 84443 ASSAY THYROID STIM HORMONE: CPT

## 2022-07-10 PROCEDURE — 74011250636 HC RX REV CODE- 250/636: Performed by: EMERGENCY MEDICINE

## 2022-07-10 PROCEDURE — 99284 EMERGENCY DEPT VISIT MOD MDM: CPT

## 2022-07-10 RX ADMIN — SODIUM CHLORIDE 500 ML: 9 INJECTION, SOLUTION INTRAVENOUS at 17:15

## 2022-07-10 NOTE — ED PROVIDER NOTES
This is a 76-year-old female comes emergency room with chief complaint of chest pain and tachycardia. Patient states that she was recently diagnosed with atrial fibrillation. Patient states that she was admitted to the hospital and given Cardizem which resulted in a spontaneous conversion back into a normal sinus rhythm. Patient states that she went on vacation last week and also states that she has been really stressed here lately after the loss of her  backing when her of this year. Patient states that at approximately 2:00 she felt her whole body get warm. Patient checked her vitals and had an elevated blood pressure and a fast heart rate. Patient was concerned that she may be in atrial fibrillation again. Patient states that she started feeling better on the ride to the hospital.  Patient states that she has an upper respiratory infection which is more than likely viral in nature. Patient states that she has been tested for COVID multiple times over the past week. The history is provided by the patient. Rapid Heart Rate  This is a new problem. The current episode started 1 to 2 hours ago. The problem occurs every several days. The problem has been gradually improving. Associated symptoms include chest pain. Pertinent negatives include no abdominal pain, no headaches and no shortness of breath. Nothing aggravates the symptoms. Nothing relieves the symptoms. She has tried nothing for the symptoms.         Past Medical History:   Diagnosis Date    Hypertension        Past Surgical History:   Procedure Laterality Date    HX KNEE REPLACEMENT Right 03/01/2020    HX OTHER SURGICAL      lap band    HX TUBAL LIGATION           Family History:   Problem Relation Age of Onset    Heart Disease Father        Social History     Socioeconomic History    Marital status:      Spouse name: Not on file    Number of children: Not on file    Years of education: Not on file    Highest education level: Not on file   Occupational History    Not on file   Tobacco Use    Smoking status: Never Smoker    Smokeless tobacco: Never Used   Substance and Sexual Activity    Alcohol use: Yes     Comment: occationally    Drug use: Never    Sexual activity: Not on file   Other Topics Concern    Not on file   Social History Narrative    Not on file     Social Determinants of Health     Financial Resource Strain:     Difficulty of Paying Living Expenses: Not on file   Food Insecurity:     Worried About Running Out of Food in the Last Year: Not on file    Bunny of Food in the Last Year: Not on file   Transportation Needs:     Lack of Transportation (Medical): Not on file    Lack of Transportation (Non-Medical): Not on file   Physical Activity:     Days of Exercise per Week: Not on file    Minutes of Exercise per Session: Not on file   Stress:     Feeling of Stress : Not on file   Social Connections:     Frequency of Communication with Friends and Family: Not on file    Frequency of Social Gatherings with Friends and Family: Not on file    Attends Christianity Services: Not on file    Active Member of [address] [place] [address] or Organizations: Not on file    Attends Club or Organization Meetings: Not on file    Marital Status: Not on file   Intimate Partner Violence:     Fear of Current or Ex-Partner: Not on file    Emotionally Abused: Not on file    Physically Abused: Not on file    Sexually Abused: Not on file   Housing Stability:     Unable to Pay for Housing in the Last Year: Not on file    Number of [place] in the Last Year: Not on file    Unstable Housing in the Last Year: Not on file     ALLERGIES: Patient has no known allergies. Review of Systems   Constitutional: Negative for appetite change, chills, fever and unexpected weight change. HENT: Negative for ear pain, hearing loss, rhinorrhea and trouble swallowing. Eyes: Negative for pain and visual disturbance.    Respiratory: Negative for cough, chest tightness and shortness of breath. Cardiovascular: Positive for chest pain. Negative for palpitations. Gastrointestinal: Negative for abdominal distention, abdominal pain, blood in stool and vomiting. Genitourinary: Negative for dysuria, hematuria and urgency. Musculoskeletal: Negative for back pain and myalgias. Skin: Negative for rash. Neurological: Negative for dizziness, syncope, weakness, numbness and headaches. Psychiatric/Behavioral: Negative for confusion and suicidal ideas. All other systems reviewed and are negative. Vitals:    07/10/22 1612 07/10/22 1621 07/10/22 1716 07/10/22 1924   BP: (!) 155/81  (!) 145/77 (!) 143/68   Pulse: 97  72 68   Resp: 18  18 18   Temp: 97.7 °F (36.5 °C)      SpO2: 97% 97% 98% 99%   Weight: 94.8 kg (209 lb)      Height: 5' 3\" (1.6 m)               Physical Exam  Vitals and nursing note reviewed. Constitutional:       General: She is not in acute distress. Appearance: She is well-developed. She is not diaphoretic. HENT:      Head: Normocephalic and atraumatic. Right Ear: External ear normal.      Left Ear: External ear normal.      Nose: Nose normal.      Mouth/Throat:      Pharynx: No oropharyngeal exudate. Eyes:      General: No scleral icterus. Right eye: No discharge. Left eye: No discharge. Conjunctiva/sclera: Conjunctivae normal.      Pupils: Pupils are equal, round, and reactive to light. Neck:      Vascular: No JVD. Trachea: No tracheal deviation. Cardiovascular:      Rate and Rhythm: Regular rhythm. Tachycardia present. Heart sounds: Normal heart sounds. No murmur heard. No friction rub. No gallop. Pulmonary:      Effort: Pulmonary effort is normal. No respiratory distress. Breath sounds: No stridor. Examination of the right-middle field reveals wheezing. Examination of the left-middle field reveals wheezing. Examination of the right-lower field reveals rhonchi.  Examination of the left-lower field reveals rhonchi. Wheezing and rhonchi present. No decreased breath sounds or rales. Chest:      Chest wall: No tenderness. Abdominal:      General: Bowel sounds are normal. There is no distension. Palpations: Abdomen is soft. Tenderness: There is no abdominal tenderness. There is no guarding or rebound. Musculoskeletal:         General: No tenderness. Normal range of motion. Cervical back: Normal range of motion and neck supple. Skin:     General: Skin is warm and dry. Capillary Refill: Capillary refill takes less than 2 seconds. Coloration: Skin is not pale. Findings: No erythema or rash. Neurological:      General: No focal deficit present. Mental Status: She is alert and oriented to person, place, and time. GCS: GCS eye subscore is 4. GCS verbal subscore is 5. GCS motor subscore is 6. Cranial Nerves: No cranial nerve deficit. Sensory: No sensory deficit. Motor: No weakness or abnormal muscle tone. Coordination: Coordination normal.      Deep Tendon Reflexes: Reflexes are normal and symmetric. Reflexes normal.   Psychiatric:         Mood and Affect: Mood normal.         Behavior: Behavior normal.         Thought Content:  Thought content normal.         Judgment: Judgment normal.          MDM  Number of Diagnoses or Management Options     Amount and/or Complexity of Data Reviewed  Clinical lab tests: ordered and reviewed  Tests in the radiology section of CPT®: ordered and reviewed  Tests in the medicine section of CPT®: ordered and reviewed  Independent visualization of images, tracings, or specimens: yes (EKG)    Risk of Complications, Morbidity, and/or Mortality  Presenting problems: moderate  Diagnostic procedures: moderate  Management options: moderate    Patient Progress  Patient progress: stable       Procedures    Chief Complaint   Patient presents with    Rapid Heart Rate       The patient's presenting problems have been discussed, and they are in agreement with the care plan formulated and outlined with them. I have encouraged them to ask questions as they arise throughout their visit. MEDICATIONS GIVEN:  Medications   sodium chloride 0.9 % bolus infusion 500 mL (0 mL IntraVENous IV Completed 7/10/22 1747)       LABS REVIEWED:  Recent Results (from the past 24 hour(s))   EKG, 12 LEAD, INITIAL    Collection Time: 07/10/22  4:13 PM   Result Value Ref Range    Ventricular Rate 102 BPM    Atrial Rate 102 BPM    P-R Interval 161 ms    QRS Duration 101 ms    Q-T Interval 332 ms    QTC Calculation (Bezet) 433 ms    Calculated P Axis 44 degrees    Calculated R Axis 18 degrees    Calculated T Axis 20 degrees    Diagnosis       Sinus tachycardia  Probable left atrial enlargement  Low voltage, precordial leads     METABOLIC PANEL, COMPREHENSIVE    Collection Time: 07/10/22  4:30 PM   Result Value Ref Range    Sodium 140 136 - 145 mmol/L    Potassium 3.4 (L) 3.5 - 5.5 mmol/L    Chloride 109 100 - 111 mmol/L    CO2 26 21 - 32 mmol/L    Anion gap 5 3.0 - 18.0 mmol/L    Glucose 118 (H) 74 - 99 mg/dL    BUN 25 (H) 7 - 18 mg/dL    Creatinine 0.96 0.60 - 1.30 mg/dL    BUN/Creatinine ratio 26 (H) 12 - 20      GFR est AA >60 >60 ml/min/1.73m2    GFR est non-AA 59 (L) >60 ml/min/1.73m2    Calcium 8.8 8.5 - 10.1 mg/dL    Bilirubin, total 0.3 0.2 - 1.0 mg/dL    AST (SGOT) 21 10 - 38 U/L    ALT (SGPT) 20 13 - 56 U/L    Alk.  phosphatase 125 (H) 45 - 117 U/L    Protein, total 7.0 6.4 - 8.2 g/dL    Albumin 3.1 (L) 3.4 - 5.0 g/dL    Globulin 3.9 2.0 - 4.0 g/dL    A-G Ratio 0.8 0.8 - 1.7     MAGNESIUM    Collection Time: 07/10/22  4:30 PM   Result Value Ref Range    Magnesium 2.3 1.6 - 2.6 mg/dL   CBC WITH AUTOMATED DIFF    Collection Time: 07/10/22  4:30 PM   Result Value Ref Range    WBC 9.0 4.6 - 13.2 K/uL    RBC 3.98 (L) 4.20 - 5.30 M/uL    HGB 11.5 (L) 12.0 - 16.0 g/dL    HCT 34.8 (L) 35.0 - 45.0 %    MCV 87.4 78.0 - 100.0 FL    MCH 28.9 24.0 - 34.0 PG    MCHC 33.0 31.0 - 37.0 g/dL    RDW 13.2 11.6 - 14.5 %    PLATELET 639 727 - 003 K/uL    MPV 9.5 9.2 - 11.8 FL    NRBC 0.0 0.0  WBC    ABSOLUTE NRBC 0.00 0.00 - 0.01 K/uL    NEUTROPHILS 50 40 - 73 %    LYMPHOCYTES 40 21 - 52 %    MONOCYTES 7 3 - 10 %    EOSINOPHILS 2 0 - 5 %    BASOPHILS 1 0 - 2 %    IMMATURE GRANULOCYTES 0 0 - 0.5 %    ABS. NEUTROPHILS 4.6 1.8 - 8.0 K/UL    ABS. LYMPHOCYTES 3.6 0.9 - 3.6 K/UL    ABS. MONOCYTES 0.6 0.05 - 1.2 K/UL    ABS. EOSINOPHILS 0.2 0.0 - 0.4 K/UL    ABS. BASOPHILS 0.1 0.0 - 0.1 K/UL    ABS. IMM. GRANS. 0.0 0.00 - 0.04 K/UL    DF AUTOMATED     TSH 3RD GENERATION    Collection Time: 07/10/22  4:30 PM   Result Value Ref Range    TSH 0.49 0.36 - 3.74 uIU/mL   TROPONIN-HIGH SENSITIVITY    Collection Time: 07/10/22  4:30 PM   Result Value Ref Range    Troponin-High Sensitivity 5 0 - 54 ng/L   TROPONIN-HIGH SENSITIVITY    Collection Time: 07/10/22  6:20 PM   Result Value Ref Range    Troponin-High Sensitivity 8 0 - 54 ng/L       VITAL SIGNS:  Patient Vitals for the past 24 hrs:   Temp Pulse Resp BP SpO2   07/10/22 1924 -- 68 18 (!) 143/68 99 %   07/10/22 1716 -- 72 18 (!) 145/77 98 %   07/10/22 1621 -- -- -- -- 97 %   07/10/22 1612 97.7 °F (36.5 °C) 97 18 (!) 155/81 97 %       RADIOLOGY RESULTS:  The following have been ordered and reviewed:  No results found. ED EKG interpretation:  Rhythm: sinus tachycardia; and regular . Rate (approx.): 102; Axis: normal; P wave: normal; QRS interval: Normal; ST/T wave: normal; Other findings: abnormal ekg, probable left atrial enlargement. This EKG was interpreted by Yen Punches, DO, ED Provider. PROGRESS NOTES:  Discussed results and plan with patient. Patient given IV fluids here in the emergency room. Patient feeling better. Patient remained in a sinus rhythm the entire time. Patient's heart rate did come down while the emergency room on its own. Patient instructed to follow-up with PCP and cardiology. Patient states that she has follow-up this week with her cardiologist.  Patient discharged home in stable condition. DIAGNOSIS:    1. Tachycardia        PLAN:  Follow-up Information     Follow up With Specialties Details Why Contact Santosh Niño NP Nurse Practitioner Schedule an appointment as soon as possible for a visit   [address]  [phone]      [facility] EMERGENCY DEPT Emergency Medicine  If symptoms worsen [place] [id number]  [phone]        Current Discharge Medication List          ED COURSE: The patient's hospital course has been uncomplicated. Please note that this dictation was completed with Shareablee, the computer voice recognition software. Quite often unanticipated grammatical, syntax, homophones, and other interpretive errors are inadvertently transcribed by the computer software. Please disregard these errors. Please excuse any errors that have escaped final proofreading.

## 2022-07-10 NOTE — ED TRIAGE NOTES
Watching tv, whole body felt hot, mp 160/80, pulse 112. Uneasy feeling dx with afib.    Dx with upper rep infection

## 2022-07-11 LAB
ATRIAL RATE: 102 BPM
CALCULATED P AXIS, ECG09: 44 DEGREES
CALCULATED R AXIS, ECG10: 18 DEGREES
CALCULATED T AXIS, ECG11: 20 DEGREES
DIAGNOSIS, 93000: NORMAL
P-R INTERVAL, ECG05: 161 MS
Q-T INTERVAL, ECG07: 332 MS
QRS DURATION, ECG06: 101 MS
QTC CALCULATION (BEZET), ECG08: 433 MS
T4 FREE SERPL-MCNC: 1 NG/DL (ref 0.7–1.5)
VENTRICULAR RATE, ECG03: 102 BPM

## 2022-07-12 ENCOUNTER — OFFICE VISIT (OUTPATIENT)
Dept: FAMILY MEDICINE CLINIC | Age: 62
End: 2022-07-12
Payer: COMMERCIAL

## 2022-07-12 VITALS
HEIGHT: 63 IN | HEART RATE: 71 BPM | DIASTOLIC BLOOD PRESSURE: 81 MMHG | OXYGEN SATURATION: 97 % | WEIGHT: 226 LBS | BODY MASS INDEX: 40.04 KG/M2 | SYSTOLIC BLOOD PRESSURE: 154 MMHG | TEMPERATURE: 98.4 F | RESPIRATION RATE: 18 BRPM

## 2022-07-12 DIAGNOSIS — I48.91 ATRIAL FIBRILLATION WITH RVR (HCC): Primary | ICD-10-CM

## 2022-07-12 DIAGNOSIS — F51.01 PRIMARY INSOMNIA: ICD-10-CM

## 2022-07-12 PROCEDURE — 99214 OFFICE O/P EST MOD 30 MIN: CPT | Performed by: NURSE PRACTITIONER

## 2022-07-12 NOTE — PROGRESS NOTES
History of Present Illness  Richa Gutierrez is a 57 y.o. female who presents today for:    Chief Complaint   Patient presents with    Follow-up     hospital follow up     Past Medical History  Past Medical History:   Diagnosis Date    Hypertension         Surgical History  Past Surgical History:   Procedure Laterality Date    HX KNEE REPLACEMENT Right 03/01/2020    HX OTHER SURGICAL      lap band    HX TUBAL LIGATION          Current Medications  Current Outpatient Medications   Medication Sig    apixaban (ELIQUIS) 5 mg tablet Take 1 Tablet by mouth every twelve (12) hours.  dilTIAZem ER (Cardizem CD) 120 mg capsule Take 1 Capsule by mouth daily.  doxepin (SINEquan) 25 mg capsule Take 2 Capsules by mouth nightly.  lisinopriL (PRINIVIL, ZESTRIL) 10 mg tablet Take 1 Tablet by mouth daily. (Patient not taking: Reported on 7/12/2022)     No current facility-administered medications for this visit.        Allergies/Drug Reactions  No Known Allergies     Family History  Family History   Problem Relation Age of Onset    Heart Disease Father         Social History  Social History     Tobacco Use    Smoking status: Never Smoker    Smokeless tobacco: Never Used   Substance Use Topics    Alcohol use: Yes     Comment: occationally    Drug use: Never        Health Maintenance   Topic Date Due    Hepatitis C Screening  Never done    DTaP/Tdap/Td series (1 - Tdap) Never done    Cervical cancer screen  Never done    Colorectal Cancer Screening Combo  Never done    Shingrix Vaccine Age 50> (1 of 2) Never done    Flu Vaccine (1) 09/01/2022    Depression Monitoring  01/27/2023    Breast Cancer Screen Mammogram  06/16/2024    Lipid Screen  10/15/2026    COVID-19 Vaccine  Completed    Pneumococcal 0-64 years  Aged [employer] History   Administered Date(s) Administered    COVID-19, MODERNA BLUE border, Primary or Immunocompromised, (age 18y+), IM, 100 mcg/0.5mL 01/12/2021, 02/09/2021, 12/15/2021  Influenza Vaccine 11/10/2021     Physical Exam  Vital signs:   Vitals:    07/12/22 1633 07/12/22 1637   BP: (!) 149/77 (!) 154/81   Pulse: 71    Resp: 18    Temp: 98.4 °F (36.9 °C)    TempSrc: Temporal    SpO2: 97%    Weight: 226 lb (102.5 kg)    Height: 5' 3\" (1.6 m)      Laboratory/Tests:  Patient reports hospitalization and was diagnosed with atrial fibrillation on July 1, 2022. She was prescribed Cardizem 120 mg capsule daily and Eliquis 5 mg tablet BID. She has been evaluated by [facility] provider and will follow up on 7/14/22022. ECG revealed new onset of atrial fibrillation. Patient's blood pressure was elevated during this visit and she reports she has not been using her prescribed Lisinopril 10 mg tablet daily. Patient reports she has not taken her prescribed Lisinopril 10 mg tablet daily since discharge from hospital on July 2, 2022. I have advised the patient to resume her prescribed Lisinopril 10 mg tablet nightly at this time. Assessment/Plan:    1. Atrial fibrillation with RVR. Continue Apixaban 5 mg tablet twice daily and Diltiazem 120 mg capsule daily for management of atrial fibrillation with RVR. Patient will follow up as directed with [facility] for management of atrial fibrillation. 2.  Essential hypertension. Continue Lisinopril 10 mg tablet nightly for management of essential hypertension. 3.  Primary insomnia. Continue Doxepin 25 mg capsule take 1 to 2 capsules nightly for management of primary insomnia. I have discussed the diagnosis with the patient and the intended plan as seen in the above orders. The patient has received an after-visit summary and questions were answered concerning future plans. I have discussed medication side effects and warnings with the patient as well. I have reviewed the plan of care with the patient, accepted their input and they are in agreement with the treatment goals.        Valerie Diaz NP  July 12, 2022

## 2022-07-12 NOTE — PROGRESS NOTES
Richa Gutierrez presents today for   Chief Complaint   Patient presents with   [facility] Follow-up     hospital follow up       Is someone accompanying this pt? no    Is the patient using any DME equipment during OV? no    Depression Screening:  3 most recent PHQ Screens 7/12/2022   Little interest or pleasure in doing things Not at all   Feeling down, depressed, irritable, or hopeless Not at all   Total Score PHQ 2 0   Trouble falling or staying asleep, or sleeping too much -   Feeling tired or having little energy -   Poor appetite, weight loss, or overeating -   Feeling bad about yourself - or that you are a failure or have let yourself or your family down -   Trouble concentrating on things such as school, work, reading, or watching TV -   Moving or speaking so slowly that other people could have noticed; or the opposite being so fidgety that others notice -   Thoughts of being better off dead, or hurting yourself in some way -   PHQ 9 Score -   How difficult have these problems made it for you to do your work, take care of your home and get along with others -       Learning Assessment:  Learning Assessment 9/8/2020   PRIMARY LEARNER Patient   HIGHEST LEVEL OF EDUCATION - PRIMARY LEARNER  SOME COLLEGE   BARRIERS PRIMARY LEARNER NONE   PRIMARY LANGUAGE ENGLISH   LEARNER PREFERENCE PRIMARY LISTENING   ANSWERED BY patient   RELATIONSHIP SELF       Fall Risk  Fall Risk Assessment, last 12 mths 11/24/2020   Able to walk? Yes   Fall in past 12 months?  No       ADL  ADL Assessment 7/12/2022   Feeding yourself No Help Needed   Getting from bed to chair No Help Needed   Getting dressed No Help Needed   Bathing or showering No Help Needed   Walk across the room (includes cane/walker) No Help Needed   Using the telphone No Help Needed   Taking your medications No Help Needed   Preparing meals No Help Needed   Managing money (expenses/bills) No Help Needed   Moderately strenuous housework (laundry) No Help Needed   Shopping for personal items (toiletries/medicines) No Help Needed   Shopping for groceries No Help Needed   Driving No Help Needed   Climbing a flight of stairs No Help Needed   Getting to places beyond walking distances No Help Needed       Health Maintenance reviewed and discussed and ordered per Provider. Health Maintenance Due   Topic Date Due    Hepatitis C Screening  Never done    DTaP/Tdap/Td series (1 - Tdap) Never done    Cervical cancer screen  Never done    Colorectal Cancer Screening Combo  Never done    Shingrix Vaccine Age 50> (1 of 2) Never done   . Coordination of Care:  1. \"Have you been to the ER, urgent care clinic since your last visit? Hospitalized since your last visit? \" Yes Where: [facility]     2. \"Have you seen or consulted any other health care providers outside of the [address] [place] [address] [place] since your last visit? \" No     3. For patients aged 39-70: Has the patient had a colonoscopy? No     If the patient is female:    4. For patients aged 41-77: Has the patient had a mammogram within the past 2 years? Yes - no Care Gap present    5. For patients aged 21-65: Has the patient had a pap smear?  No

## 2022-08-27 ENCOUNTER — HOSPITAL ENCOUNTER (EMERGENCY)
Age: 62
Discharge: HOME | End: 2022-08-27
Attending: FAMILY MEDICINE | Admitting: FAMILY MEDICINE
Payer: COMMERCIAL

## 2022-08-27 ENCOUNTER — APPOINTMENT (OUTPATIENT)
Dept: GENERAL RADIOLOGY | Age: 62
End: 2022-08-27
Attending: FAMILY MEDICINE
Payer: COMMERCIAL

## 2022-08-27 VITALS
DIASTOLIC BLOOD PRESSURE: 85 MMHG | RESPIRATION RATE: 20 BRPM | OXYGEN SATURATION: 98 % | WEIGHT: 204 LBS | BODY MASS INDEX: 36.14 KG/M2 | SYSTOLIC BLOOD PRESSURE: 132 MMHG | HEIGHT: 63 IN | TEMPERATURE: 97.8 F | HEART RATE: 115 BPM

## 2022-08-27 DIAGNOSIS — I48.91 ATRIAL FIBRILLATION WITH RAPID VENTRICULAR RESPONSE (HCC): Primary | ICD-10-CM

## 2022-08-27 LAB
ALBUMIN SERPL-MCNC: 3.7 G/DL (ref 3.4–5)
ALBUMIN/GLOB SERPL: 1.1 {RATIO} (ref 0.8–1.7)
ALP SERPL-CCNC: 162 U/L (ref 45–117)
ALT SERPL-CCNC: 23 U/L (ref 13–56)
ANION GAP SERPL CALC-SCNC: 12 MMOL/L (ref 3–18)
AST SERPL W P-5'-P-CCNC: 20 U/L (ref 10–38)
BASOPHILS # BLD: 0.1 K/UL (ref 0–0.1)
BASOPHILS NFR BLD: 1 % (ref 0–2)
BILIRUB SERPL-MCNC: 0.2 MG/DL (ref 0.2–1)
BNP SERPL-MCNC: 79 PG/ML (ref 0–900)
BUN SERPL-MCNC: 25 MG/DL (ref 7–18)
BUN/CREAT SERPL: 26 (ref 12–20)
CA-I BLD-MCNC: 10.2 MG/DL (ref 8.5–10.1)
CHLORIDE SERPL-SCNC: 103 MMOL/L (ref 100–111)
CO2 SERPL-SCNC: 27 MMOL/L (ref 21–32)
CREAT SERPL-MCNC: 0.97 MG/DL (ref 0.6–1.3)
DIFFERENTIAL METHOD BLD: ABNORMAL
EOSINOPHIL # BLD: 0.2 K/UL (ref 0–0.4)
EOSINOPHIL NFR BLD: 2 % (ref 0–5)
ERYTHROCYTE [DISTWIDTH] IN BLOOD BY AUTOMATED COUNT: 13.3 % (ref 11.6–14.5)
GLOBULIN SER CALC-MCNC: 3.5 G/DL (ref 2–4)
GLUCOSE SERPL-MCNC: 128 MG/DL (ref 74–99)
HCT VFR BLD AUTO: 40.3 % (ref 35–45)
HGB BLD-MCNC: 13 G/DL (ref 12–16)
IMM GRANULOCYTES # BLD AUTO: 0 K/UL (ref 0–0.04)
IMM GRANULOCYTES NFR BLD AUTO: 0 % (ref 0–0.5)
INR PPP: 0.9 (ref 0.8–1.2)
LYMPHOCYTES # BLD: 3.9 K/UL (ref 0.9–3.6)
LYMPHOCYTES NFR BLD: 37 % (ref 21–52)
MAGNESIUM SERPL-MCNC: 2.1 MG/DL (ref 1.6–2.6)
MCH RBC QN AUTO: 28.4 PG (ref 24–34)
MCHC RBC AUTO-ENTMCNC: 32.3 G/DL (ref 31–37)
MCV RBC AUTO: 88.2 FL (ref 78–100)
MONOCYTES # BLD: 1.2 K/UL (ref 0.05–1.2)
MONOCYTES NFR BLD: 11 % (ref 3–10)
NEUTS SEG # BLD: 5.2 K/UL (ref 1.8–8)
NEUTS SEG NFR BLD: 49 % (ref 40–73)
NRBC # BLD: 0 K/UL (ref 0–0.01)
NRBC BLD-RTO: 0 PER 100 WBC
PLATELET # BLD AUTO: 351 K/UL (ref 135–420)
PMV BLD AUTO: 9.9 FL (ref 9.2–11.8)
POTASSIUM SERPL-SCNC: 3.7 MMOL/L (ref 3.5–5.5)
PROT SERPL-MCNC: 7.2 G/DL (ref 6.4–8.2)
PROTHROMBIN TIME: 12.6 SEC (ref 11.5–15.2)
RBC # BLD AUTO: 4.57 M/UL (ref 4.2–5.3)
SODIUM SERPL-SCNC: 142 MMOL/L (ref 136–145)
TROPONIN-HIGH SENSITIVITY: 7 NG/L (ref 0–54)
WBC # BLD AUTO: 10.7 K/UL (ref 4.6–13.2)

## 2022-08-27 PROCEDURE — 96376 TX/PRO/DX INJ SAME DRUG ADON: CPT | Performed by: FAMILY MEDICINE

## 2022-08-27 PROCEDURE — 99285 EMERGENCY DEPT VISIT HI MDM: CPT | Performed by: FAMILY MEDICINE

## 2022-08-27 PROCEDURE — 96375 TX/PRO/DX INJ NEW DRUG ADDON: CPT | Performed by: FAMILY MEDICINE

## 2022-08-27 PROCEDURE — 80053 COMPREHEN METABOLIC PANEL: CPT

## 2022-08-27 PROCEDURE — 84484 ASSAY OF TROPONIN QUANT: CPT

## 2022-08-27 PROCEDURE — 85610 PROTHROMBIN TIME: CPT

## 2022-08-27 PROCEDURE — 74011000250 HC RX REV CODE- 250: Performed by: FAMILY MEDICINE

## 2022-08-27 PROCEDURE — 71045 X-RAY EXAM CHEST 1 VIEW: CPT

## 2022-08-27 PROCEDURE — 85025 COMPLETE CBC W/AUTO DIFF WBC: CPT

## 2022-08-27 PROCEDURE — 93005 ELECTROCARDIOGRAM TRACING: CPT

## 2022-08-27 PROCEDURE — 83880 ASSAY OF NATRIURETIC PEPTIDE: CPT

## 2022-08-27 PROCEDURE — 83735 ASSAY OF MAGNESIUM: CPT

## 2022-08-27 PROCEDURE — 36415 COLL VENOUS BLD VENIPUNCTURE: CPT

## 2022-08-27 PROCEDURE — 74011250636 HC RX REV CODE- 250/636: Performed by: FAMILY MEDICINE

## 2022-08-27 PROCEDURE — 96374 THER/PROPH/DIAG INJ IV PUSH: CPT | Performed by: FAMILY MEDICINE

## 2022-08-27 RX ORDER — DILTIAZEM HYDROCHLORIDE 5 MG/ML
10 INJECTION INTRAVENOUS
Status: COMPLETED | OUTPATIENT
Start: 2022-08-27 | End: 2022-08-27

## 2022-08-27 RX ORDER — METOPROLOL TARTRATE 5 MG/5ML
2.5 INJECTION INTRAVENOUS
Status: COMPLETED | OUTPATIENT
Start: 2022-08-27 | End: 2022-08-27

## 2022-08-27 RX ADMIN — DILTIAZEM HYDROCHLORIDE 10 MG: 5 INJECTION INTRAVENOUS at 02:34

## 2022-08-27 RX ADMIN — METOPROLOL TARTRATE 2.5 MG: 5 INJECTION INTRAVENOUS at 03:19

## 2022-08-27 RX ADMIN — SODIUM CHLORIDE 1000 ML: 9 INJECTION, SOLUTION INTRAVENOUS at 01:30

## 2022-08-27 RX ADMIN — DILTIAZEM HYDROCHLORIDE 10 MG: 5 INJECTION INTRAVENOUS at 01:30

## 2022-08-27 NOTE — PROGRESS NOTES
Hr 120-130's. States she fills a fullness in her chest, denies pain.  /91  Diltiazem 10 mg IVP slowly given as ordered

## 2022-08-27 NOTE — PROGRESS NOTES
0130-On bedpan & voided large amount clear yellow urine. Diltiazem 10mg IVP as ordered. Bp 160/92  R 20.  NS 1000cc bolus started

## 2022-08-27 NOTE — ED PROVIDER NOTES
EMERGENCY DEPARTMENT HISTORY AND PHYSICAL EXAM      Date: 8/27/2022  Patient Name: Tonio Ferris    History of Presenting Illness     Chief Complaint   Patient presents with    Irregular Heart Beat       History Provided By: Patient    HPI: Tonio Ferris, 57 y.o. female with a past medical history significant hypertension and anxiety, afib with RVR  presents to the ED with cc of chest discomfort. Patient was diagnosed with new onset afib 7/1/22 during at ER visit. She is on Eliquis and Cardizem. 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 She woke up from sleep and noted fast heart rate and the racing sensation she felt with the A. fib. Since being diagnosed she was in the ER 1 other time with A. fib with RVR. She follows with [facility] cardiology. She has an appointment in 2 weeks. She denies chest pain, headache, fevers, chills, cough, shortness of breath, abdominal pain, numbness, tingling, syncopal episodes. No changes in diet or recent travel. She has been having some increased reflux yesterday and today. There are no other complaints, changes, or physical findings at this time. PCP: Rey Malin NP    No current facility-administered medications on file prior to encounter. Current Outpatient Medications on File Prior to Encounter   Medication Sig Dispense Refill    apixaban (ELIQUIS) 5 mg tablet Take 1 Tablet by mouth every twelve (12) hours. 60 Tablet 0    dilTIAZem ER (Cardizem CD) 120 mg capsule Take 1 Capsule by mouth daily. 30 Capsule 0    lisinopriL (PRINIVIL, ZESTRIL) 10 mg tablet Take 1 Tablet by mouth daily. (Patient not taking: Reported on 7/12/2022) 30 Tablet 0    doxepin (SINEquan) 25 mg capsule Take 2 Capsules by mouth nightly.  90 Capsule 1       Past History     Past Medical History:  Past Medical History:   Diagnosis Date    Hypertension        Past Surgical History:  Past Surgical History:   Procedure Laterality Date    HX KNEE REPLACEMENT Right 03/01/2020    HX OTHER SURGICAL      lap band    HX TUBAL LIGATION         Family History:  Family History   Problem Relation Age of Onset    Heart Disease Father        Social History:  Social History     Tobacco Use    Smoking status: Never    Smokeless tobacco: Never   Substance Use Topics    Alcohol use: Yes     Comment: occationally    Drug use: Never       Allergies:  No Known Allergies      Review of Systems   CONSTITUTIONAL: Denies weight loss, fever and chills. HEENT: Denies changes in vision and hearing. RESPIRATORY: Denies SOB and cough. CV: +rapid heart rate, palpitations. GI: Denies abdominal pain, nausea, vomiting and diarrhea. : Denies dysuria and urinary frequency. MSK: Denies myalgia and joint pain. SKIN: Denies rash and pruritus. NEUROLOGICAL: Denies headache and syncope. PSYCHIATRIC: Denies recent changes in mood. Denies anxiety and depression. Review of Systems    Physical Exam   GENERAL: Alert and oriented x 3. No acute distress. Well-nourished. EYES: EOMI. Anicteric. HENT: Moist mucous membranes. No scleral icterus. No cervical lymphadenopathy. LUNGS: Clear to auscultation bilaterally. No accessory muscle use. CARDIOVASCULAR: Irregularly irregular. Tachycardic, rate 150 during exam.  ABDOMEN: Soft, non-tender and non-distended. No palpable masses. EXTREMITIES: No edema. Non-tender. SKIN: No rashes or lesions. Warm. NEUROLOGIC: No focal neurological deficits. CN II-XII grossly intact, but not individually tested. PSYCHIATRIC: Cooperative. Appropriate mood and affect.     Physical Exam    Lab and Diagnostic Study Results     Labs -     Recent Results (from the past 12 hour(s))   CBC WITH AUTOMATED DIFF    Collection Time: 08/27/22 12:58 AM   Result Value Ref Range    WBC 10.7 4.6 - 13.2 K/uL    RBC 4.57 4.20 - 5.30 M/uL    HGB 13.0 12.0 - 16.0 g/dL    HCT 40.3 35.0 - 45.0 %    MCV 88.2 78.0 - 100.0 FL    MCH 28.4 24.0 - 34.0 PG    MCHC 32.3 31.0 - 37.0 g/dL RDW 13.3 11.6 - 14.5 %    PLATELET 013 388 - 749 K/uL    MPV 9.9 9.2 - 11.8 FL    NRBC 0.0 0.0  WBC    ABSOLUTE NRBC 0.00 0.00 - 0.01 K/uL    NEUTROPHILS 49 40 - 73 %    LYMPHOCYTES 37 21 - 52 %    MONOCYTES 11 (H) 3 - 10 %    EOSINOPHILS 2 0 - 5 %    BASOPHILS 1 0 - 2 %    IMMATURE GRANULOCYTES 0 0 - 0.5 %    ABS. NEUTROPHILS 5.2 1.8 - 8.0 K/UL    ABS. LYMPHOCYTES 3.9 (H) 0.9 - 3.6 K/UL    ABS. MONOCYTES 1.2 0.05 - 1.2 K/UL    ABS. EOSINOPHILS 0.2 0.0 - 0.4 K/UL    ABS. BASOPHILS 0.1 0.0 - 0.1 K/UL    ABS. IMM. GRANS. 0.0 0.00 - 0.04 K/UL    DF AUTOMATED     PROTHROMBIN TIME + INR    Collection Time: 08/27/22 12:58 AM   Result Value Ref Range    Prothrombin time 12.6 11.5 - 15.2 sec    INR 0.9 0.8 - 1.2     METABOLIC PANEL, COMPREHENSIVE    Collection Time: 08/27/22 12:58 AM   Result Value Ref Range    Sodium 142 136 - 145 mmol/L    Potassium 3.7 3.5 - 5.5 mmol/L    Chloride 103 100 - 111 mmol/L    CO2 27 21 - 32 mmol/L    Anion gap 12 3.0 - 18.0 mmol/L    Glucose 128 (H) 74 - 99 mg/dL    BUN 25 (H) 7 - 18 mg/dL    Creatinine 0.97 0.60 - 1.30 mg/dL    BUN/Creatinine ratio 26 (H) 12 - 20      GFR est AA >60 >60 ml/min/1.73m2    GFR est non-AA 58 (L) >60 ml/min/1.73m2    Calcium 10.2 (H) 8.5 - 10.1 mg/dL    Bilirubin, total 0.2 0.2 - 1.0 mg/dL    AST (SGOT) 20 10 - 38 U/L    ALT (SGPT) 23 13 - 56 U/L    Alk.  phosphatase 162 (H) 45 - 117 U/L    Protein, total 7.2 6.4 - 8.2 g/dL    Albumin 3.7 3.4 - 5.0 g/dL    Globulin 3.5 2.0 - 4.0 g/dL    A-G Ratio 1.1 0.8 - 1.7     NT-PRO BNP    Collection Time: 08/27/22 12:58 AM   Result Value Ref Range    NT pro-BNP 79 0 - 900 pg/mL   TROPONIN-HIGH SENSITIVITY    Collection Time: 08/27/22 12:58 AM   Result Value Ref Range    Troponin-High Sensitivity 7 0 - 54 ng/L   MAGNESIUM    Collection Time: 08/27/22 12:58 AM   Result Value Ref Range    Magnesium 2.1 1.6 - 2.6 mg/dL       Radiologic Studies -   @lastxrresult@  CT Results  (Last 48 hours)      None          CXR Results (Last 48 hours)      None              Medical Decision Making   - I am the first provider for this patient. - I reviewed the vital signs, available nursing notes, past medical history, past surgical history, family history and social history. - Initial assessment performed. The patients presenting problems have been discussed, and they are in agreement with the care plan formulated and outlined with them. I have encouraged them to ask questions as they arise throughout their visit. Vital Signs-Reviewed the patient's vital signs. Patient Vitals for the past 12 hrs:   Temp Pulse Resp BP SpO2   08/27/22 0319 -- (!) 115 -- 132/85 --   08/27/22 0205 -- (!) 126 20 136/77 98 %   08/27/22 0059 97.8 °F (36.6 °C) (!) 155 24 (!) 148/103 97 %       Records Reviewed: Nursing Notes, Old Medical Records, and Previous electrocardiograms    The patient presents with rapid heart rate, palpitations with a differential diagnosis of tachycardia, atrial fibrillation with rapid ventricular response, SVT, arrhythmia, ACS, STEMI, anxiety, panic attack      ED Course:     ED Course as of 08/27/22 0528   Sat Aug 27, 2022   0107 Pleasant 25-year-old female with a history of recently diagnosed atrial fibrillation presents to ER with A. fib with RVR. [OM]   0107 TODAY I, Patrizia Díaz D.O., PERSONALLY VISUALIZED THE PATIENT'S EKG TRACING. I AM THE PRIMARY .  52, , QTc 455. Atrial fibrillation with RVR.  [OM]   0145 WBC: 10.7 [OM]   0145 RBC: 4.57 [OM]   0145 HGB: 13.0 [OM]   0145 HCT: 40.3 [OM]   0207 Troponin-High Sensitivity: 7 [OM]   0207 NT pro-BNP: 79 [OM]   0207 Sodium: 142 [OM]   0207 Potassium: 3.7 [OM]   0207 Chloride: 103 [OM]   0207 CO2: 27 [OM]   0207 Anion gap: 12 [OM]   0207 BUN(!): 25 [OM]   0207 Creatinine: 0.97 [OM]   0207 BUN/Creatinine ratio(!): 26 [OM]   0212 Prothrombin time: 12.6 [OM]   0212 INR: 0.9 [OM]   0212 Magnesium: 2.1 [OM]   0213 Troponin-High Sensitivity: 7 [OM]   0213 NT pro-BNP: 79 [OM]      ED Course User Index  [OM] Mary Lou Townsend DO       Provider Notes (Medical Decision Making):   Patient with recently diagnosed atrial fibrillation presents to ER in A. fib with RVR. She has been having some increased reflux but has not missed any of her antiarrhythmic or anticoagulant. She is worked up in the ER and monitored on cardiac monitor. She is given to 10 mg doses of IV Cardizem which brings her out of RVR and prior to discharge 1 2.5 mg dose of Lopressor. She feels much better and at time of discharge, her heart rate is 90 and she denies any palpitations. She has a follow-up appointment with her cardiologist and will return to the ER if she continues to have any symptoms. 5:27 AM  I have spent 30 minutes of critical care time involved in lab review, consultations with specialist, family decision-making, and documentation. During this entire length of time I was immediately available to the patient. Critical Care: The reason for providing this level of medical care for this critically ill patient was due a critical illness that impaired one or more vital organ systems such that there was a high probability of imminent or life threatening deterioration in the patients condition. This care involved high complexity decision making to assess, manipulate, and support vital system functions, to treat this degreee vital organ system failure and to prevent further life threatening deterioration of the patients condition. MDM         Procedures   Medical Decision Makingedical Decision Making  Performed by: Alissa Espinoza DO  PROCEDURES:  Procedures       Disposition   Disposition: Condition stable  DC- Adult Discharges: All of the diagnostic tests were reviewed and questions answered. Diagnosis, care plan and treatment options were discussed. The patient understands the instructions and will follow up as directed. The patients results have been reviewed with them.   They have been counseled regarding their diagnosis. The patient verbally convey understanding and agreement of the signs, symptoms, diagnosis, treatment and prognosis and additionally agrees to follow up as recommended with their PCP in 24 - 48 hours. They also agree with the care-plan and convey that all of their questions have been answered. I have also put together some discharge instructions for them that include: 1) educational information regarding their diagnosis, 2) how to care for their diagnosis at home, as well a 3) list of reasons why they would want to return to the ED prior to their follow-up appointment, should their condition change. Discharged    DISCHARGE PLAN:  1. Current Discharge Medication List        CONTINUE these medications which have NOT CHANGED    Details   apixaban (ELIQUIS) 5 mg tablet Take 1 Tablet by mouth every twelve (12) hours. Qty: 60 Tablet, Refills: 0      dilTIAZem ER (Cardizem CD) 120 mg capsule Take 1 Capsule by mouth daily. Qty: 30 Capsule, Refills: 0      lisinopriL (PRINIVIL, ZESTRIL) 10 mg tablet Take 1 Tablet by mouth daily. Qty: 30 Tablet, Refills: 0      doxepin (SINEquan) 25 mg capsule Take 2 Capsules by mouth nightly. Qty: 90 Capsule, Refills: 1    Associated Diagnoses: Positive depression screening; Primary insomnia; Adjustment disorder, unspecified type           2. Follow-up Information       Follow up With Specialties Details [facility]  Schedule an appointment as soon as possible for a visit   [address] [place] [address]  [phone]          3. Return to ED if worse   4. Discharge Medication List as of 8/27/2022  3:31 AM            Diagnosis     Clinical Impression:   1. Atrial fibrillation with rapid ventricular response (HCC)        Attestations:    Ruther Skiff, DO    Please note that this dictation was completed with [employer], the computer voice recognition software.   Quite often unanticipated grammatical, syntax, homophones, and other interpretive errors are inadvertently transcribed by the computer software. Please disregard these errors. Please excuse any errors that have escaped final proofreading. Thank you.

## 2022-08-27 NOTE — ED TRIAGE NOTES
Pt stated that about an hour ago she started having a rapid heart rate and heaviness in her chest. Pt states she is on eliquis and has a history of afib.

## 2022-08-28 LAB
ATRIAL RATE: 203 BPM
CALCULATED R AXIS, ECG10: 7 DEGREES
CALCULATED T AXIS, ECG11: 22 DEGREES
DIAGNOSIS, 93000: NORMAL
P-R INTERVAL, ECG05: 114 MS
Q-T INTERVAL, ECG07: 286 MS
QRS DURATION, ECG06: 99 MS
QTC CALCULATION (BEZET), ECG08: 455 MS
VENTRICULAR RATE, ECG03: 152 BPM

## 2022-09-07 ENCOUNTER — TRANSCRIBE ORDER (OUTPATIENT)
Dept: SCHEDULING | Age: 62
End: 2022-09-07

## 2022-09-07 DIAGNOSIS — I48.91 ATRIAL FIBRILLATION (HCC): Primary | ICD-10-CM

## 2022-09-09 ENCOUNTER — HOSPITAL ENCOUNTER (OUTPATIENT)
Dept: CT IMAGING | Age: 62
Discharge: HOME | End: 2022-09-09
Attending: INTERNAL MEDICINE
Payer: COMMERCIAL

## 2022-09-09 ENCOUNTER — TRANSCRIBE ORDER (OUTPATIENT)
Dept: REGISTRATION | Age: 62
End: 2022-09-09

## 2022-09-09 ENCOUNTER — HOSPITAL ENCOUNTER (OUTPATIENT)
Dept: NON INVASIVE DIAGNOSTICS | Age: 62
Discharge: HOME | End: 2022-09-09
Attending: INTERNAL MEDICINE
Payer: COMMERCIAL

## 2022-09-09 ENCOUNTER — PATIENT OUTREACH (OUTPATIENT)
Dept: CASE MANAGEMENT | Age: 62
End: 2022-09-09

## 2022-09-09 ENCOUNTER — HOSPITAL ENCOUNTER (OUTPATIENT)
Dept: LAB | Age: 62
Discharge: HOME | End: 2022-09-09
Attending: INTERNAL MEDICINE
Payer: COMMERCIAL

## 2022-09-09 DIAGNOSIS — I48.91 ATRIAL FIBRILLATION (HCC): ICD-10-CM

## 2022-09-09 DIAGNOSIS — I48.91 A-FIB (HCC): Primary | ICD-10-CM

## 2022-09-09 DIAGNOSIS — I48.91 A-FIB (HCC): ICD-10-CM

## 2022-09-09 LAB
ALBUMIN SERPL-MCNC: 3.5 G/DL (ref 3.4–5)
ALBUMIN/GLOB SERPL: 0.9 {RATIO} (ref 0.8–1.7)
ALP SERPL-CCNC: 131 U/L (ref 45–117)
ALT SERPL-CCNC: 21 U/L (ref 13–56)
ANION GAP SERPL CALC-SCNC: 8 MMOL/L (ref 3–18)
APTT PPP: 33 SEC (ref 23–36.4)
AST SERPL W P-5'-P-CCNC: 17 U/L (ref 10–38)
BILIRUB SERPL-MCNC: 0.4 MG/DL (ref 0.2–1)
BUN SERPL-MCNC: 25 MG/DL (ref 7–18)
BUN/CREAT SERPL: 25 (ref 12–20)
CA-I BLD-MCNC: 9.5 MG/DL (ref 8.5–10.1)
CHLORIDE SERPL-SCNC: 100 MMOL/L (ref 100–111)
CO2 SERPL-SCNC: 30 MMOL/L (ref 21–32)
CREAT SERPL-MCNC: 1 MG/DL (ref 0.6–1.3)
GLOBULIN SER CALC-MCNC: 3.8 G/DL (ref 2–4)
GLUCOSE SERPL-MCNC: 88 MG/DL (ref 74–99)
HCT VFR BLD AUTO: 40.6 % (ref 35–45)
INR PPP: 1 (ref 0.8–1.2)
MCH RBC QN AUTO: 28.6 PG (ref 24–34)
MCHC RBC AUTO-ENTMCNC: 32 G/DL (ref 31–37)
POTASSIUM SERPL-SCNC: 3.8 MMOL/L (ref 3.5–5.5)
PROT SERPL-MCNC: 7.3 G/DL (ref 6.4–8.2)
PROTHROMBIN TIME: 13.7 SEC (ref 11.5–15.2)
SODIUM SERPL-SCNC: 138 MMOL/L (ref 136–145)
THERAPEUTIC RANGE,PTTT: NORMAL SEC (ref 82–109)

## 2022-09-09 PROCEDURE — 71275 CT ANGIOGRAPHY CHEST: CPT

## 2022-09-09 PROCEDURE — 85610 PROTHROMBIN TIME: CPT

## 2022-09-09 PROCEDURE — 93005 ELECTROCARDIOGRAM TRACING: CPT

## 2022-09-09 PROCEDURE — 36415 COLL VENOUS BLD VENIPUNCTURE: CPT

## 2022-09-09 PROCEDURE — 74011000636 HC RX REV CODE- 636: Performed by: INTERNAL MEDICINE

## 2022-09-09 PROCEDURE — 80053 COMPREHEN METABOLIC PANEL: CPT

## 2022-09-09 PROCEDURE — 85730 THROMBOPLASTIN TIME PARTIAL: CPT

## 2022-09-09 PROCEDURE — 85014 HEMATOCRIT: CPT

## 2022-09-09 RX ORDER — HYDROCHLOROTHIAZIDE 12.5 MG/1
12.5 TABLET ORAL DAILY
COMMUNITY
Start: 2022-07-15

## 2022-09-09 RX ORDER — METOPROLOL TARTRATE 25 MG/1
25 TABLET, FILM COATED ORAL
COMMUNITY
Start: 2022-09-06

## 2022-09-09 RX ORDER — FUROSEMIDE 20 MG/1
20 TABLET ORAL DAILY
COMMUNITY
Start: 2022-09-06

## 2022-09-09 RX ORDER — PANTOPRAZOLE SODIUM 20 MG/1
20 TABLET, DELAYED RELEASE ORAL DAILY
COMMUNITY
Start: 2022-09-08

## 2022-09-09 RX ADMIN — IOPAMIDOL 95 ML: 755 INJECTION, SOLUTION INTRAVENOUS at 10:52

## 2022-09-09 NOTE — PROGRESS NOTES
Date/Time:  2022 4:29 PM    Method of communication with patient:phone    [address] [place] [address] ([facility]) contacted the patient by telephone to perform Ambulatory Care Coordination. Verified name and  (PHI) with patient as identifiers. Provided introduction to self, and explanation of the Ambulatory Care Manager's role. Encounter Note:   Patient enrolled in Complex Case Management effective 2022 and will be followed per [facility] protocol. Initial questions answered with subsequent encounters planned. Reviewed upcoming appointments - verified that patient could attend appointments  Further questions answered as needed, patient has [facility] contact information  Preliminary review of medications done during this encounter - will do full med rec on next encounter  Depression screen done - PHQ2 score -  0    Reviewed most recent clinic visit w/ patient who verbalized understanding. Patient given an opportunity to ask questions. Notes / Challenges    NA       The patient agrees to contact the PCP office or the [address] [place] [address] for questions related to their healthcare. Provided contact information for future reference. Disease Specific:   N/A    Home Health Active: No    DME Active: No    Barriers to care? lack of knowledge about disease    ACP Decision maker    Advance Care Planning 2022   Confirm Advance Directive None   Patient Would Like to Complete Advance Directive No         Medication(s):   Medication reconciliation was not performed with patient, who verbalizes understanding of administration of home medications. There were no barriers to obtaining medications identified at this time. Current Outpatient Medications   Medication Sig    pantoprazole (PROTONIX) 20 mg tablet Take 20 mg by mouth daily. hydroCHLOROthiazide (HYDRODIURIL) 12.5 mg tablet Take 12.5 mg by mouth daily. in the morning    furosemide (LASIX) 20 mg tablet Take 20 mg by mouth daily.  1/2 tab    metoprolol tartrate (LOPRESSOR) 25 mg tablet Take 25 mg by mouth daily as needed. Patient stated for edema    apixaban (ELIQUIS) 5 mg tablet Take 1 Tablet by mouth every twelve (12) hours. dilTIAZem ER (Cardizem CD) 120 mg capsule Take 1 Capsule by mouth daily. doxepin (SINEquan) 25 mg capsule Take 2 Capsules by mouth nightly. No current facility-administered medications for this visit.        [facility] follow up appointment(s):   Future Appointments   Date Time Provider Tanya Perez   10/14/2022  4:30 PM Jamila Balderrama NP [facility]           Goals Addressed                   This Visit's Progress     Attend follow up appointments on schedule   On track     Prepare patients and caregivers for end of life decisions (ie. need for hospice, pain management, symptom relief, advance directives etc.)   On track     Take all medications as ordered   On track

## 2022-09-11 LAB
ATRIAL RATE: 66 BPM
CALCULATED P AXIS, ECG09: 71 DEGREES
CALCULATED R AXIS, ECG10: 42 DEGREES
CALCULATED T AXIS, ECG11: 24 DEGREES
DIAGNOSIS, 93000: NORMAL
P-R INTERVAL, ECG05: 174 MS
Q-T INTERVAL, ECG07: 408 MS
QRS DURATION, ECG06: 90 MS
QTC CALCULATION (BEZET), ECG08: 427 MS
VENTRICULAR RATE, ECG03: 66 BPM

## 2022-09-13 ENCOUNTER — ANESTHESIA EVENT (OUTPATIENT)
Dept: NON INVASIVE DIAGNOSTICS | Age: 62
End: 2022-09-13
Payer: COMMERCIAL

## 2022-09-14 ENCOUNTER — HOSPITAL ENCOUNTER (OUTPATIENT)
Age: 62
Discharge: HOME | End: 2022-09-14
Attending: INTERNAL MEDICINE | Admitting: INTERNAL MEDICINE
Payer: COMMERCIAL

## 2022-09-14 ENCOUNTER — ANESTHESIA (OUTPATIENT)
Dept: NON INVASIVE DIAGNOSTICS | Age: 62
End: 2022-09-14
Payer: COMMERCIAL

## 2022-09-14 VITALS
OXYGEN SATURATION: 97 % | TEMPERATURE: 97.8 F | DIASTOLIC BLOOD PRESSURE: 74 MMHG | HEIGHT: 63 IN | BODY MASS INDEX: 40.4 KG/M2 | RESPIRATION RATE: 18 BRPM | WEIGHT: 228 LBS | SYSTOLIC BLOOD PRESSURE: 144 MMHG | HEART RATE: 85 BPM

## 2022-09-14 DIAGNOSIS — I48.0 PAROXYSMAL ATRIAL FIBRILLATION (HCC): ICD-10-CM

## 2022-09-14 PROBLEM — I48.91 A-FIB (HCC): Status: ACTIVE | Noted: 2022-09-14

## 2022-09-14 LAB
ACT BLD: 288 SEC (ref 74–125)
ACT BLD: 300 SEC (ref 74–125)
ACT BLD: 306 SEC (ref 74–125)
ACT BLD: 312 SEC (ref 74–125)
ACT BLD: 323 SEC (ref 74–125)
ACT BLD: 323 SEC (ref 74–125)
ACT BLD: 329 SEC (ref 74–125)
ANION GAP BLD CALC-SCNC: 12 MMOL/L
CA-I BLD-MCNC: 1.18 MMOL/L (ref 1.12–1.32)
CHLORIDE BLD-SCNC: 105 MMOL/L (ref 98–107)
CO2 BLD-SCNC: 26 MMOL/L
CREAT UR-MCNC: 0.98 MG/DL (ref 0.6–1.3)
GLUCOSE BLD STRIP.AUTO-MCNC: 109 MG/DL (ref 65–100)
PERFORMED BY, TECHID: ABNORMAL
POTASSIUM BLD-SCNC: 3.6 MMOL/L (ref 3.5–5.5)
SODIUM BLD-SCNC: 142 MMOL/L (ref 136–145)

## 2022-09-14 PROCEDURE — C1732 CATH, EP, DIAG/ABL, 3D/VECT: HCPCS | Performed by: INTERNAL MEDICINE

## 2022-09-14 PROCEDURE — 77030035291 HC TBNG PMP SMARTABLATE J&J -B: Performed by: INTERNAL MEDICINE

## 2022-09-14 PROCEDURE — 77030013079 HC BLNKT BAIR HGGR 3M -A: Performed by: INTERNAL MEDICINE

## 2022-09-14 PROCEDURE — 93656 COMPRE EP EVAL ABLTJ ATR FIB: CPT | Performed by: INTERNAL MEDICINE

## 2022-09-14 PROCEDURE — 80047 BASIC METABLC PNL IONIZED CA: CPT

## 2022-09-14 PROCEDURE — 76210000023 HC REC RM PH II 2 TO 2.5 HR: Performed by: INTERNAL MEDICINE

## 2022-09-14 PROCEDURE — 76060000041 HC ANESTHESIA 5 TO 5.5 HR: Performed by: INTERNAL MEDICINE

## 2022-09-14 PROCEDURE — 93657 TX L/R ATRIAL FIB ADDL: CPT | Performed by: INTERNAL MEDICINE

## 2022-09-14 PROCEDURE — 74011000250 HC RX REV CODE- 250: Performed by: ANESTHESIOLOGY

## 2022-09-14 PROCEDURE — 74011250636 HC RX REV CODE- 250/636: Performed by: INTERNAL MEDICINE

## 2022-09-14 PROCEDURE — 2709999900 HC NON-CHARGEABLE SUPPLY: Performed by: INTERNAL MEDICINE

## 2022-09-14 PROCEDURE — 77030018729 HC ELECTRD DEFIB PAD CARD -B: Performed by: INTERNAL MEDICINE

## 2022-09-14 PROCEDURE — C1894 INTRO/SHEATH, NON-LASER: HCPCS | Performed by: INTERNAL MEDICINE

## 2022-09-14 PROCEDURE — C1893 INTRO/SHEATH, FIXED,NON-PEEL: HCPCS | Performed by: INTERNAL MEDICINE

## 2022-09-14 PROCEDURE — 93623 PRGRMD STIMJ&PACG IV RX NFS: CPT | Performed by: INTERNAL MEDICINE

## 2022-09-14 PROCEDURE — 77030027107 HC PTCH EXT REF CARTO3 J&J -F: Performed by: INTERNAL MEDICINE

## 2022-09-14 PROCEDURE — 93613 INTRACARDIAC EPHYS 3D MAPG: CPT | Performed by: INTERNAL MEDICINE

## 2022-09-14 PROCEDURE — C1760 CLOSURE DEV, VASC: HCPCS | Performed by: INTERNAL MEDICINE

## 2022-09-14 PROCEDURE — 77030013797 HC KT TRNSDUC PRSSR EDWD -A: Performed by: INTERNAL MEDICINE

## 2022-09-14 PROCEDURE — C1769 GUIDE WIRE: HCPCS | Performed by: INTERNAL MEDICINE

## 2022-09-14 PROCEDURE — 85347 COAGULATION TIME ACTIVATED: CPT

## 2022-09-14 PROCEDURE — C1733 CATH, EP, OTHR THAN COOL-TIP: HCPCS | Performed by: INTERNAL MEDICINE

## 2022-09-14 PROCEDURE — 77030026818 HC NDL TRNSPTL BRK STJU -C: Performed by: INTERNAL MEDICINE

## 2022-09-14 PROCEDURE — 93662 INTRACARDIAC ECG (ICE): CPT | Performed by: INTERNAL MEDICINE

## 2022-09-14 PROCEDURE — 74011250636 HC RX REV CODE- 250/636: Performed by: ANESTHESIOLOGY

## 2022-09-14 PROCEDURE — 76937 US GUIDE VASCULAR ACCESS: CPT | Performed by: INTERNAL MEDICINE

## 2022-09-14 PROCEDURE — 76210000006 HC OR PH I REC 0.5 TO 1 HR: Performed by: INTERNAL MEDICINE

## 2022-09-14 PROCEDURE — 93005 ELECTROCARDIOGRAM TRACING: CPT

## 2022-09-14 RX ORDER — ADENOSINE 3 MG/ML
INJECTION, SOLUTION INTRAVENOUS AS NEEDED
Status: DISCONTINUED | OUTPATIENT
Start: 2022-09-14 | End: 2022-09-14 | Stop reason: HOSPADM

## 2022-09-14 RX ORDER — LIDOCAINE HYDROCHLORIDE 10 MG/ML
0.1 INJECTION, SOLUTION EPIDURAL; INFILTRATION; INTRACAUDAL; PERINEURAL AS NEEDED
Status: CANCELLED | OUTPATIENT
Start: 2022-09-14

## 2022-09-14 RX ORDER — MORPHINE SULFATE 10 MG/ML
INJECTION, SOLUTION INTRAMUSCULAR; INTRAVENOUS AS NEEDED
Status: DISCONTINUED | OUTPATIENT
Start: 2022-09-14 | End: 2022-09-14 | Stop reason: HOSPADM

## 2022-09-14 RX ORDER — SODIUM CHLORIDE 0.9 % (FLUSH) 0.9 %
5-40 SYRINGE (ML) INJECTION AS NEEDED
Status: CANCELLED | OUTPATIENT
Start: 2022-09-14

## 2022-09-14 RX ORDER — ONDANSETRON 2 MG/ML
INJECTION INTRAMUSCULAR; INTRAVENOUS AS NEEDED
Status: DISCONTINUED | OUTPATIENT
Start: 2022-09-14 | End: 2022-09-14 | Stop reason: HOSPADM

## 2022-09-14 RX ORDER — PHENYLEPHRINE HCL IN 0.9% NACL 0.4MG/10ML
SYRINGE (ML) INTRAVENOUS AS NEEDED
Status: DISCONTINUED | OUTPATIENT
Start: 2022-09-14 | End: 2022-09-14 | Stop reason: HOSPADM

## 2022-09-14 RX ORDER — LIDOCAINE HYDROCHLORIDE 20 MG/ML
INJECTION, SOLUTION EPIDURAL; INFILTRATION; INTRACAUDAL; PERINEURAL AS NEEDED
Status: DISCONTINUED | OUTPATIENT
Start: 2022-09-14 | End: 2022-09-14 | Stop reason: HOSPADM

## 2022-09-14 RX ORDER — SODIUM CHLORIDE 0.9 % (FLUSH) 0.9 %
5-40 SYRINGE (ML) INJECTION AS NEEDED
Status: DISCONTINUED | OUTPATIENT
Start: 2022-09-14 | End: 2022-09-14 | Stop reason: HOSPADM

## 2022-09-14 RX ORDER — OXYCODONE AND ACETAMINOPHEN 5; 325 MG/1; MG/1
1 TABLET ORAL AS NEEDED
Status: CANCELLED | OUTPATIENT
Start: 2022-09-14

## 2022-09-14 RX ORDER — ONDANSETRON 2 MG/ML
4 INJECTION INTRAMUSCULAR; INTRAVENOUS AS NEEDED
Status: CANCELLED | OUTPATIENT
Start: 2022-09-14

## 2022-09-14 RX ORDER — MIDAZOLAM HYDROCHLORIDE 1 MG/ML
INJECTION, SOLUTION INTRAMUSCULAR; INTRAVENOUS AS NEEDED
Status: DISCONTINUED | OUTPATIENT
Start: 2022-09-14 | End: 2022-09-14 | Stop reason: HOSPADM

## 2022-09-14 RX ORDER — EPHEDRINE SULFATE/0.9% NACL/PF 50 MG/5 ML
5 SYRINGE (ML) INTRAVENOUS AS NEEDED
Status: CANCELLED | OUTPATIENT
Start: 2022-09-14

## 2022-09-14 RX ORDER — HEPARIN SODIUM 200 [USP'U]/100ML
INJECTION, SOLUTION INTRAVENOUS
Status: COMPLETED | OUTPATIENT
Start: 2022-09-14 | End: 2022-09-14

## 2022-09-14 RX ORDER — SODIUM CHLORIDE, SODIUM LACTATE, POTASSIUM CHLORIDE, CALCIUM CHLORIDE 600; 310; 30; 20 MG/100ML; MG/100ML; MG/100ML; MG/100ML
INJECTION, SOLUTION INTRAVENOUS
Status: DISCONTINUED | OUTPATIENT
Start: 2022-09-14 | End: 2022-09-14

## 2022-09-14 RX ORDER — SODIUM CHLORIDE 9 MG/ML
50 INJECTION, SOLUTION INTRAVENOUS CONTINUOUS
Status: DISCONTINUED | OUTPATIENT
Start: 2022-09-14 | End: 2022-09-14 | Stop reason: HOSPADM

## 2022-09-14 RX ORDER — SODIUM CHLORIDE 0.9 % (FLUSH) 0.9 %
5-40 SYRINGE (ML) INJECTION EVERY 8 HOURS
Status: CANCELLED | OUTPATIENT
Start: 2022-09-14

## 2022-09-14 RX ORDER — HYDROMORPHONE HYDROCHLORIDE 1 MG/ML
0.5 INJECTION, SOLUTION INTRAMUSCULAR; INTRAVENOUS; SUBCUTANEOUS
Status: CANCELLED | OUTPATIENT
Start: 2022-09-14

## 2022-09-14 RX ORDER — PROTAMINE SULFATE 10 MG/ML
INJECTION, SOLUTION INTRAVENOUS AS NEEDED
Status: DISCONTINUED | OUTPATIENT
Start: 2022-09-14 | End: 2022-09-14 | Stop reason: HOSPADM

## 2022-09-14 RX ORDER — MIDAZOLAM HYDROCHLORIDE 1 MG/ML
1 INJECTION, SOLUTION INTRAMUSCULAR; INTRAVENOUS AS NEEDED
Status: CANCELLED | OUTPATIENT
Start: 2022-09-14

## 2022-09-14 RX ORDER — ACETAMINOPHEN 325 MG/1
650 TABLET ORAL
Status: DISCONTINUED | OUTPATIENT
Start: 2022-09-14 | End: 2022-09-14 | Stop reason: HOSPADM

## 2022-09-14 RX ORDER — ROCURONIUM BROMIDE 10 MG/ML
INJECTION, SOLUTION INTRAVENOUS AS NEEDED
Status: DISCONTINUED | OUTPATIENT
Start: 2022-09-14 | End: 2022-09-14 | Stop reason: HOSPADM

## 2022-09-14 RX ORDER — FENTANYL CITRATE 50 UG/ML
50 INJECTION, SOLUTION INTRAMUSCULAR; INTRAVENOUS
Status: CANCELLED | OUTPATIENT
Start: 2022-09-14

## 2022-09-14 RX ORDER — FENTANYL CITRATE 50 UG/ML
50 INJECTION, SOLUTION INTRAMUSCULAR; INTRAVENOUS AS NEEDED
Status: CANCELLED | OUTPATIENT
Start: 2022-09-14

## 2022-09-14 RX ORDER — MORPHINE SULFATE 2 MG/ML
2 INJECTION, SOLUTION INTRAMUSCULAR; INTRAVENOUS
Status: CANCELLED | OUTPATIENT
Start: 2022-09-14

## 2022-09-14 RX ORDER — SUCCINYLCHOLINE CHLORIDE 20 MG/ML INJECTION SOLUTION
SOLUTION AS NEEDED
Status: DISCONTINUED | OUTPATIENT
Start: 2022-09-14 | End: 2022-09-14 | Stop reason: HOSPADM

## 2022-09-14 RX ORDER — MIDAZOLAM HYDROCHLORIDE 1 MG/ML
0.5 INJECTION, SOLUTION INTRAMUSCULAR; INTRAVENOUS
Status: CANCELLED | OUTPATIENT
Start: 2022-09-14

## 2022-09-14 RX ORDER — HEPARIN SODIUM 1000 [USP'U]/ML
INJECTION, SOLUTION INTRAVENOUS; SUBCUTANEOUS AS NEEDED
Status: DISCONTINUED | OUTPATIENT
Start: 2022-09-14 | End: 2022-09-14 | Stop reason: HOSPADM

## 2022-09-14 RX ORDER — DEXAMETHASONE SODIUM PHOSPHATE 4 MG/ML
INJECTION, SOLUTION INTRA-ARTICULAR; INTRALESIONAL; INTRAMUSCULAR; INTRAVENOUS; SOFT TISSUE AS NEEDED
Status: DISCONTINUED | OUTPATIENT
Start: 2022-09-14 | End: 2022-09-14 | Stop reason: HOSPADM

## 2022-09-14 RX ORDER — SODIUM CHLORIDE 0.9 % (FLUSH) 0.9 %
5-40 SYRINGE (ML) INJECTION EVERY 8 HOURS
Status: DISCONTINUED | OUTPATIENT
Start: 2022-09-14 | End: 2022-09-14 | Stop reason: HOSPADM

## 2022-09-14 RX ORDER — FENTANYL CITRATE 50 UG/ML
INJECTION, SOLUTION INTRAMUSCULAR; INTRAVENOUS AS NEEDED
Status: DISCONTINUED | OUTPATIENT
Start: 2022-09-14 | End: 2022-09-14 | Stop reason: HOSPADM

## 2022-09-14 RX ORDER — DIPHENHYDRAMINE HYDROCHLORIDE 50 MG/ML
12.5 INJECTION, SOLUTION INTRAMUSCULAR; INTRAVENOUS AS NEEDED
Status: CANCELLED | OUTPATIENT
Start: 2022-09-14 | End: 2022-09-14

## 2022-09-14 RX ORDER — HEPARIN SODIUM 1000 [USP'U]/ML
INJECTION, SOLUTION INTRAVENOUS; SUBCUTANEOUS
Status: DISCONTINUED | OUTPATIENT
Start: 2022-09-14 | End: 2022-09-14 | Stop reason: HOSPADM

## 2022-09-14 RX ORDER — PROPOFOL 10 MG/ML
INJECTION, EMULSION INTRAVENOUS AS NEEDED
Status: DISCONTINUED | OUTPATIENT
Start: 2022-09-14 | End: 2022-09-14 | Stop reason: HOSPADM

## 2022-09-14 RX ADMIN — SODIUM CHLORIDE 50 ML/HR: 9 INJECTION, SOLUTION INTRAVENOUS at 06:24

## 2022-09-14 RX ADMIN — HEPARIN SODIUM 1800 UNITS/HR: 1000 INJECTION, SOLUTION INTRAVENOUS; SUBCUTANEOUS at 09:08

## 2022-09-14 RX ADMIN — Medication 160 MG: at 08:10

## 2022-09-14 RX ADMIN — Medication 40 MCG: at 08:58

## 2022-09-14 RX ADMIN — HEPARIN SODIUM 3000 UNITS: 1000 INJECTION, SOLUTION INTRAVENOUS; SUBCUTANEOUS at 09:09

## 2022-09-14 RX ADMIN — HEPARIN SODIUM 12000 UNITS: 1000 INJECTION, SOLUTION INTRAVENOUS; SUBCUTANEOUS at 08:52

## 2022-09-14 RX ADMIN — Medication 40 MCG: at 12:01

## 2022-09-14 RX ADMIN — FENTANYL CITRATE 50 MCG: 50 INJECTION, SOLUTION INTRAMUSCULAR; INTRAVENOUS at 08:29

## 2022-09-14 RX ADMIN — PROTAMINE SULFATE 20 MG: 10 INJECTION, SOLUTION INTRAVENOUS at 12:22

## 2022-09-14 RX ADMIN — ROCURONIUM BROMIDE 20 MG: 10 INJECTION, SOLUTION INTRAVENOUS at 08:15

## 2022-09-14 RX ADMIN — MORPHINE SULFATE 2 MG: 10 INJECTION, SOLUTION INTRAMUSCULAR; INTRAVENOUS at 09:15

## 2022-09-14 RX ADMIN — LIDOCAINE HYDROCHLORIDE 100 MG: 20 INJECTION, SOLUTION EPIDURAL; INFILTRATION; INTRACAUDAL; PERINEURAL at 08:10

## 2022-09-14 RX ADMIN — Medication 40 MCG: at 08:32

## 2022-09-14 RX ADMIN — Medication 40 MCG: at 08:38

## 2022-09-14 RX ADMIN — DEXAMETHASONE SODIUM PHOSPHATE 8 MG: 4 INJECTION, SOLUTION INTRA-ARTICULAR; INTRALESIONAL; INTRAMUSCULAR; INTRAVENOUS; SOFT TISSUE at 08:32

## 2022-09-14 RX ADMIN — HEPARIN SODIUM 3000 UNITS: 1000 INJECTION, SOLUTION INTRAVENOUS; SUBCUTANEOUS at 09:57

## 2022-09-14 RX ADMIN — FENTANYL CITRATE 50 MCG: 50 INJECTION, SOLUTION INTRAMUSCULAR; INTRAVENOUS at 08:10

## 2022-09-14 RX ADMIN — ONDANSETRON 4 MG: 2 INJECTION INTRAMUSCULAR; INTRAVENOUS at 11:44

## 2022-09-14 RX ADMIN — MIDAZOLAM HYDROCHLORIDE 2 MG: 2 INJECTION, SOLUTION INTRAMUSCULAR; INTRAVENOUS at 08:05

## 2022-09-14 RX ADMIN — Medication 40 MCG: at 12:09

## 2022-09-14 RX ADMIN — PROPOFOL 200 MG: 10 INJECTION, EMULSION INTRAVENOUS at 08:10

## 2022-09-14 NOTE — DISCHARGE INSTRUCTIONS
Procedure name: You had an atrial fibrillation and atrial flutter ablation with Dr. Liat Wong on 09/14/22. Activity restrictions for the next 5 days:    - No lifting greater than 10 pounds  - Do no strain or participate in vigorous activity  - Do not sit in a bathtub, hot tub, or go into a swimming pool. Driving Instructions:    - No driving for 24 hours after your procedure    Symptom management - What to expect:    - Soreness or tenderness at the site that may last for several weeks. - Bruising at the site that may take 2-3 weeks to go away. - A small lump or bump (dime to quarter size), which may last up to 6 weeks. Please let us know if you have new or increasing pain at the groin site. - For MINOR pain: You may take acetaminophen (Tylenol®) 325 mg tablets every 4-6 hours. You may place an ice pack or warm pack over the site for 20 minutes every 2 hours. Gently wipe the site after you remove the pack if it is wet. - If you are prescribed colchicine (an anti-inflammatory) after your ablation, please be aware this could cause loose stools. If you develop loose stools, please decrease your dose to 1 tablet daily. If loose stools continue after you reduce your dose, then you may discontinue this medication. What you may feel after the ablation:    - In the first 3-6 months after ablation it is not uncommon to experience transient recurrence of atrial fibrillation. [facility] if these episodes last longer than 24 hours or if they are causing bothersome symptoms.  - For several days to weeks following the ablation it is not uncommon for patients to feel new palpitations, sense of heart beat irregularly, and/or as though the arrhythmia is \"trying\" to recur. This usually resolves with time, as the inflammation and irritation improve from procedure. - Please let us know if your symptoms are increasingly bothersome or persistent.     Wound care:    - You may shower 24 hours after the procedure. - Remove the Band-Aid, or dressing, over the site before taking a shower. - For 3 days, gently clean the site with soap and water, pat dry, and leave open to air.  - Keep the site dry. - Inspect the site daily for redness, swelling, or drainage. When to call your Doctor:    - If you had anesthesia and have new difficulty swallowing, pain while swallowing, coughing up or vomiting blood, fever for unknown reasons, or mental status changes, which may occur 1-4 weeks post procedure, seek medical care immediately. - If you had a boyle catheter and you develop urinary retention, please contact our office. - If your chest discomfort becomes worse and is not relieved by the medications prescribed, call the [facility] Cardiology. - If bleeding or sudden swelling should occur at the site, apply direct pressure. If the bleeding does not stop after 10 minutes of placing constant pressure on the site, call 911 for emergency help. Keep pressure on the site until help arrives. - If your extremity becomes very swollen, cold, turns blue or you develop new numbness or weakness, call 911 for emergency help. - If you have severe pain, chest pain, new back pain, increased shortness of breath, notice any signs of infection including: redness, swelling, or drainage at the site/prolonged pain/fever over 101.0°F for two readings taken a few hours apart or have any other questions/concerns, [facility] Cardiology clinic at [phone] (Monday - Friday, 8am - 5pm). After hours, nights, weekends, and holidays, this same number is answered by the message center. Ask for the Cardiology doctor on-call. Give the  your full name and phone number with the area code. The doctor will call you back. Diet:    - Resume previous diet: Heart Healthy Diet     Medication instructions:    1. Please take Eliquis (anticoagulation/blood thinner) at the regular dose and timing and do not miss any doses for the next 4 weeks. Please contact [facility] Cardiology if questions or concerns arise about interrupting or stopping your blood thinner. 2. Resume all other medications.     Follow-up:  According to previously scheduled appointment with Dr Isadora Tovar

## 2022-09-14 NOTE — PERIOP NOTES
Dr. Chele Hendrix inserted an arterial line catheter in the left radio artery. The patient was identified correctly, the site was marked, the procedure was explained, surgical consent was signed, the patient was remained monitored throughout the anesthesia act, and all vital signs were recorded in the electronic system. Procedure was performed without immediate complications.   Time out: [facility]  Start time in:0708  End Dee Dee Berry RN

## 2022-09-14 NOTE — PROGRESS NOTES
Called Dr. Low Abraham to clarify about post opt orders, he confirmed pt to lay flat for 2 hours and lay at 38 degrees for 1 hours, then may be discharge. Informed starr hernandez on report.

## 2022-09-14 NOTE — PERIOP NOTES
Patient alert and oriented x4, VS stable, no complaints of pain at this time. Sister at bedside. Discharge instructions/education provided to sister, Sveta Kaplan, she verbalized understanding and had no questions. Patient discharged in wheelchair to home with sister at main entrance of hospital via private vehicle.

## 2022-09-14 NOTE — PERIOP NOTES
Patient sitting up at 38 degrees since 1415. Bilateral groin dressing sites clean dry and intact. No hematoma present. Bilateral pedal pulses +2, palpable and present.

## 2022-09-14 NOTE — ANESTHESIA POSTPROCEDURE EVALUATION
Procedure(s):  ABLATION A-FIB  W COMPLETE EP STUDY  EP 3D MAPPING  INTRACARDIAC ECHOCARDIOGRAM  ULTRASOUND GUIDED VASCULAR ACCESS  DRUG STIMULATION  ABLATION FOLLOWING A-FIB  ADDL.     general    Anesthesia Post Evaluation      Multimodal analgesia: multimodal analgesia used between 6 hours prior to anesthesia start to PACU discharge  Patient location during evaluation: PACU  Patient participation: complete - patient participated  Level of consciousness: awake  Pain management: adequate  Airway patency: patent  Cardiovascular status: acceptable  Respiratory status: acceptable  Hydration status: acceptable  Post anesthesia nausea and vomiting:  none  Final Post Anesthesia Temperature Assessment:  Normothermia (36.0-37.5 degrees C)      INITIAL Post-op Vital signs:   Vitals Value Taken Time   /68 09/14/22 1252   Temp     Pulse 90 09/14/22 1252   Resp 18 09/14/22 1252   SpO2

## 2022-09-15 LAB
ATRIAL RATE: 87 BPM
CALCULATED P AXIS, ECG09: 53 DEGREES
CALCULATED R AXIS, ECG10: 15 DEGREES
CALCULATED T AXIS, ECG11: 46 DEGREES
DIAGNOSIS, 93000: NORMAL
P-R INTERVAL, ECG05: 178 MS
Q-T INTERVAL, ECG07: 394 MS
QRS DURATION, ECG06: 88 MS
QTC CALCULATION (BEZET), ECG08: 474 MS
VENTRICULAR RATE, ECG03: 87 BPM

## 2022-09-16 ENCOUNTER — PATIENT OUTREACH (OUTPATIENT)
Dept: CASE MANAGEMENT | Age: 62
End: 2022-09-16

## 2022-09-16 NOTE — PROGRESS NOTES
Date/Time:  2022 4:29 PM    Method of communication with patient:phone    [facility] ([facility]) contacted the patient by telephone to perform Ambulatory Care Coordination. Verified name and  (PHI) with patient as identifiers. Provided introduction to self, and explanation of the Ambulatory Care Manager's role. Encounter Note:   Spoke with patient - Patient states doing well at present    Reviewed upcoming appointments - verified that patient could attend appointments  Further questions answered as needed, patient has [facility] contact information  EP study and ablation performed 22. See cardiology notes. Patient reported brief period of flutter in chest last evening - mild and transient. Shows understanding of medication therapy and importance of following therapy as prescribed. Reviewed status of supply and any refills needed. Questions answered as needed. Discussed ways to assure meds are taken on time each day. Use of labeled dispensers, etc.   No changes in med therapy noted - no complications or side effects reported. Depression screen done - PHQ2 score -  0  Appointment with cardiology 22  Reviewed most recent clinic visit w/ patient who verbalized understanding. Patient given an opportunity to ask questions. Notes / Challenges    NA       The patient agrees to contact the PCP office or the [facility] for questions related to their healthcare. Provided contact information for future reference. Disease Specific:   N/A    Home Health Active: No    DME Active: No    Barriers to care? lack of knowledge about disease    ACP Decision maker    Advance Care Planning 2022   Confirm Advance Directive None   Patient Would Like to Complete Advance Directive No         Medication(s):   Medication reconciliation was not performed with patient, who verbalizes understanding of administration of home medications.   There were no barriers to obtaining medications identified at this time. Current Outpatient Medications   Medication Sig    pantoprazole (PROTONIX) 20 mg tablet Take 20 mg by mouth daily. hydroCHLOROthiazide (HYDRODIURIL) 12.5 mg tablet Take 12.5 mg by mouth daily. in the morning    furosemide (LASIX) 20 mg tablet Take 20 mg by mouth daily. 1/2 tab    metoprolol tartrate (LOPRESSOR) 25 mg tablet Take 25 mg by mouth daily as needed. Patient stated for edema    apixaban (ELIQUIS) 5 mg tablet Take 1 Tablet by mouth every twelve (12) hours. dilTIAZem ER (Cardizem CD) 120 mg capsule Take 1 Capsule by mouth daily. doxepin (SINEquan) 25 mg capsule Take 2 Capsules by mouth nightly. No current facility-administered medications for this visit.        [facility] follow up appointment(s):   Future Appointments   Date Time Provider Tanya Perez   10/14/2022  4:30 PM Pedro Rodriguez NP [facility]           Goals Addressed                   This Visit's Progress     Attend follow up appointments on schedule   On track     Prepare patients and caregivers for end of life decisions (ie. need for hospice, pain management, symptom relief, advance directives etc.)   On track     Take all medications as ordered   On track

## 2022-09-20 ENCOUNTER — VIRTUAL VISIT (OUTPATIENT)
Dept: FAMILY MEDICINE CLINIC | Age: 62
End: 2022-09-20
Payer: COMMERCIAL

## 2022-09-20 DIAGNOSIS — F43.20 ADJUSTMENT DISORDER, UNSPECIFIED TYPE: ICD-10-CM

## 2022-09-20 DIAGNOSIS — F41.9 ANXIETY: Primary | ICD-10-CM

## 2022-09-20 DIAGNOSIS — I48.91 ATRIAL FIBRILLATION, UNSPECIFIED TYPE (HCC): ICD-10-CM

## 2022-09-20 DIAGNOSIS — E66.01 OBESITY, CLASS III, BMI 40-49.9 (MORBID OBESITY) (HCC): ICD-10-CM

## 2022-09-20 DIAGNOSIS — Z98.890 HISTORY OF CARDIAC RADIOFREQUENCY ABLATION: ICD-10-CM

## 2022-09-20 PROCEDURE — 99442 PR PHYS/QHP TELEPHONE EVALUATION 11-20 MIN: CPT | Performed by: NURSE PRACTITIONER

## 2022-09-20 RX ORDER — LORAZEPAM 0.5 MG/1
0.5 TABLET ORAL
Qty: 28 TABLET | Refills: 0 | Status: SHIPPED | OUTPATIENT
Start: 2022-09-20 | End: 2022-10-16 | Stop reason: SDUPTHER

## 2022-09-20 NOTE — PROGRESS NOTES
Pt. Would like to discuss a change in her meds. Pt states shes been feeling depressed within last 6 mos. Pt states she had a ablation 9/14/2022.

## 2022-09-20 NOTE — PROGRESS NOTES
Parish Mata is a 57 y.o. female, evaluated via audio-only technology on 9/20/2022 for Anxiety (Pt. States having heart ablasion last wednesday )  . Assessment & Plan:   Adjustment disorder. Prescribed Ativan 0.5 mg tablet twice daily as needed for management of adjustment disorder. Atrial fibrillation. Patient will follow up with cardiology as directed for management and treatment of atrial fibrillation. 12  Subjective:   Patient reports recent coronary ablation on 9/14/2022. Patient reports periods of increased anxiety since procedure. Patient expresses feelings of emotional regression since cardiac ablation procedure. She reports anxiety associated with cardiac ablation procedure and atrial fibrillation diagnosis has seemed to manifest feelings of increased sadness associated with death of her  in early December 2021. We will prescribe Ativan 0.5 mg tablet twice daily as needed at this visit. Patient reports she will follow up with cardiology on 9/29/2022. Prior to Admission medications    Medication Sig Start Date End Date Taking? Authorizing Provider   LORazepam (ATIVAN) 0.5 mg tablet Take 1 Tablet by mouth two (2) times daily as needed for Anxiety. Max Daily Amount: 1 mg. 9/20/22  Yes Heron Reynodls, NP   pantoprazole (PROTONIX) 20 mg tablet Take 20 mg by mouth daily. 9/8/22  Yes Provider, Historical   hydroCHLOROthiazide (HYDRODIURIL) 12.5 mg tablet Take 12.5 mg by mouth daily. in the morning 7/15/22  Yes Provider, Historical   furosemide (LASIX) 20 mg tablet Take 20 mg by mouth daily. 1/2 tab 9/6/22  Yes Provider, Historical   apixaban (ELIQUIS) 5 mg tablet Take 1 Tablet by mouth every twelve (12) hours. 7/2/22  Yes Jocelin Ortiz MD   dilTIAZem ER (Cardizem CD) 120 mg capsule Take 1 Capsule by mouth daily. 7/2/22  Yes Jocelin Ortiz MD   doxepin (SINEquan) 25 mg capsule Take 2 Capsules by mouth nightly.  5/5/22  Yes Cristóbal GARCIA NP   metoprolol tartrate (LOPRESSOR) 25 mg tablet Take 25 mg by mouth daily as needed. Patient stated for edema  Patient not taking: Reported on 9/20/2022 9/6/22   Provider, Brooke     Thelma King was evaluated through a patient-initiated, synchronous (real-time) audio only encounter. She (or guardian if applicable) is aware that it is a billable service, which includes applicable co-pays, with coverage as determined by her insurance carrier. This visit was conducted with the patient's (and/or [employer] guardian's) verbal consent. She has not had a related appointment within my department in the past 7 days or scheduled within the next 24 hours. The patient was located in a state where the provider was licensed to provide care. The patient was located at: Home: [address] [place] [address]  [id number]  The provider was located at:  Facility (Appt Department): [facility]  [phone]    Total Time: minutes: 11-20 minutes    Janny Cannon NP

## 2022-09-23 ENCOUNTER — PATIENT OUTREACH (OUTPATIENT)
Dept: CASE MANAGEMENT | Age: 62
End: 2022-09-23

## 2022-09-30 ENCOUNTER — PATIENT OUTREACH (OUTPATIENT)
Dept: CASE MANAGEMENT | Age: 62
End: 2022-09-30

## 2022-10-07 ENCOUNTER — PATIENT OUTREACH (OUTPATIENT)
Dept: CASE MANAGEMENT | Age: 62
End: 2022-10-07

## 2022-10-07 NOTE — PROGRESS NOTES
Date/Time:  10/7/2022 4:29 PM    Method of communication with patient:phone    [address] [place] [address] ([facility]) contacted the patient by telephone to perform Ambulatory Care Coordination. Verified name and  (PHI) with patient as identifiers. Provided introduction to self, and explanation of the Ambulatory Care Manager's role. Encounter Note:   Spoke with patient - Patient states doing well at present    Reviewed upcoming appointments - verified that patient could attend appointments  Further questions answered as needed, patient has [facility] contact information  Patient reported very brief period of flutter in chest l- mild and transient with no side effects   Any changes in med therapy noted in med list - no complications or side effects    Depression screen done - PHQ2 score -  0      Reviewed most recent clinic visit w/ patient who verbalized understanding. Patient given an opportunity to ask questions. Notes / Challenges    NA       The patient agrees to contact the PCP office or the [address] [place] [address] for questions related to their healthcare. Provided contact information for future reference. Disease Specific:   N/A    Home Health Active: No    DME Active: No    Barriers to care? lack of knowledge about disease    ACP Decision maker    Advance Care Planning 2022   Confirm Advance Directive None   Patient Would Like to Complete Advance Directive No         Medication(s):   Medication reconciliation was performed with patient, who verbalizes understanding of administration of home medications. There were no barriers to obtaining medications identified at this time. Current Outpatient Medications   Medication Sig    LORazepam (ATIVAN) 0.5 mg tablet Take 1 Tablet by mouth two (2) times daily as needed for Anxiety. Max Daily Amount: 1 mg.    pantoprazole (PROTONIX) 20 mg tablet Take 20 mg by mouth daily. hydroCHLOROthiazide (HYDRODIURIL) 12.5 mg tablet Take 12.5 mg by mouth daily.  in the morning    furosemide (LASIX) 20 mg tablet Take 20 mg by mouth daily. 1/2 tab    metoprolol tartrate (LOPRESSOR) 25 mg tablet Take 25 mg by mouth daily as needed. Patient stated for edema (Patient not taking: Reported on 9/20/2022)    apixaban (ELIQUIS) 5 mg tablet Take 1 Tablet by mouth every twelve (12) hours. dilTIAZem ER (Cardizem CD) 120 mg capsule Take 1 Capsule by mouth daily. doxepin (SINEquan) 25 mg capsule Take 2 Capsules by mouth nightly. No current facility-administered medications for this visit.        [facility] follow up appointment(s):   Future Appointments   Date Time Provider Tanya Perez   11/23/2022  2:30 PM Phi Weaver NP [facility] AM[facility]           Goals Addressed                   This Visit's Progress     Attend follow up appointments on schedule   On track     Prepare patients and caregivers for end of life decisions (ie. need for hospice, pain management, symptom relief, advance directives etc.)   On track     Take all medications as ordered   On track

## 2022-10-13 ENCOUNTER — TELEPHONE (OUTPATIENT)
Dept: FAMILY MEDICINE CLINIC | Age: 62
End: 2022-10-13

## 2022-10-13 NOTE — TELEPHONE ENCOUNTER
----- Message from Jocelyn Garcia sent at 10/13/2022  4:28 PM EDT -----  Subject: Refill Request    QUESTIONS  Name of Medication? LORazepam (ATIVAN) 0.5 mg tablet  Patient-reported dosage and instructions? once a day   How many days do you have left? 3  [facility]  Pharmacy phone number (if available)? [phone]  ---------------------------------------------------------------------------  --------------  CALL BACK INFO  What is the best way for the office to contact you? OK to leave message on   voicemail  Preferred Call Back Phone Number? [phone]  ---------------------------------------------------------------------------  --------------  SCRIPT ANSWERS  Relationship to Patient?  Self

## 2022-10-21 ENCOUNTER — PATIENT OUTREACH (OUTPATIENT)
Dept: CASE MANAGEMENT | Age: 62
End: 2022-10-21

## 2022-10-21 NOTE — PROGRESS NOTES
Date/Time:  10/21/2022 4:29 PM    Method of communication with patient:phone    [address] [place] [address] ([facility]) contacted the patient by telephone to perform Ambulatory Care Coordination. Verified name and  (PHI) with patient as identifiers. Provided introduction to self, and explanation of the Ambulatory Care Manager's role. Encounter Note:   Spoke with patient - Patient states doing well at present    Reviewed upcoming appointments - verified that patient could attend appointments  Further questions answered as needed, patient has [facility] contact information  Patient reported no periods of flutter in chest    Any changes in med therapy noted in med list - no complications or side effects    Depression screen done - PHQ2 score -  0      Reviewed most recent clinic visit w/ patient who verbalized understanding. Patient given an opportunity to ask questions. Notes / Challenges    NA       The patient agrees to contact the PCP office or the [address] [place] [address] for questions related to their healthcare. Provided contact information for future reference. Disease Specific:   N/A    Home Health Active: No    DME Active: No    Barriers to care? lack of knowledge about disease    ACP Decision maker    Advance Care Planning 2022   Confirm Advance Directive None   Patient Would Like to Complete Advance Directive No         Medication(s):   Medication reconciliation was performed with patient, who verbalizes understanding of administration of home medications. There were no barriers to obtaining medications identified at this time. Current Outpatient Medications   Medication Sig    LORazepam (ATIVAN) 0.5 mg tablet Take 1 Tablet by mouth daily as needed for Anxiety. pantoprazole (PROTONIX) 20 mg tablet Take 20 mg by mouth daily. hydroCHLOROthiazide (HYDRODIURIL) 12.5 mg tablet Take 12.5 mg by mouth daily. in the morning    furosemide (LASIX) 20 mg tablet Take 20 mg by mouth daily.  1/2 tab metoprolol tartrate (LOPRESSOR) 25 mg tablet Take 25 mg by mouth daily as needed. Patient stated for edema (Patient not taking: Reported on 9/20/2022)    apixaban (ELIQUIS) 5 mg tablet Take 1 Tablet by mouth every twelve (12) hours. dilTIAZem ER (Cardizem CD) 120 mg capsule Take 1 Capsule by mouth daily. doxepin (SINEquan) 25 mg capsule Take 2 Capsules by mouth nightly. No current facility-administered medications for this visit.        [facility] follow up appointment(s):   Future Appointments   Date Time Provider Tanya Perez   11/23/2022  2:30 PM Nayla Brush NP [facility] AMB           Goals Addressed                   This Visit's Progress     Attend follow up appointments on schedule   On track     Prepare patients and caregivers for end of life decisions (ie. need for hospice, pain management, symptom relief, advance directives etc.)   On track     Take all medications as ordered   On track

## 2022-10-28 ENCOUNTER — TELEPHONE (OUTPATIENT)
Dept: FAMILY MEDICINE CLINIC | Age: 62
End: 2022-10-28

## 2022-10-28 DIAGNOSIS — F51.01 PRIMARY INSOMNIA: ICD-10-CM

## 2022-10-28 DIAGNOSIS — Z13.31 POSITIVE DEPRESSION SCREENING: ICD-10-CM

## 2022-10-28 DIAGNOSIS — F43.20 ADJUSTMENT DISORDER, UNSPECIFIED TYPE: ICD-10-CM

## 2022-10-28 DIAGNOSIS — F41.9 ANXIETY: ICD-10-CM

## 2022-10-28 RX ORDER — DOXEPIN HYDROCHLORIDE 50 MG/1
50 CAPSULE ORAL
Qty: 60 CAPSULE | Refills: 0 | Status: SHIPPED | OUTPATIENT
Start: 2022-10-28 | End: 2022-11-07 | Stop reason: DRUGHIGH

## 2022-10-28 RX ORDER — LORAZEPAM 0.5 MG/1
0.5 TABLET ORAL
Qty: 14 TABLET | Refills: 0 | OUTPATIENT
Start: 2022-10-28

## 2022-10-28 NOTE — TELEPHONE ENCOUNTER
Patient called in requesting a refill of Ativan. She stated she knows you want to take her off of it but she only takes it once daily at night. She stated she will run out this weekend. Has an appt scheduled with you on 11/23/22 but is open to scheduling one sooner if needed.

## 2022-10-28 NOTE — TELEPHONE ENCOUNTER
Spoke with patient via phone call on 10/28/2022 at 1 PM.  Advised patient to discontinue use of her prescribed Lorazepam.  Patient reports she has not been taking her prescribed Doxepin 25 mg capsule nightly since initiation of oral Lorazepam.  She reports she did not increase her prescribed Doxepin 25 mg capsule to 2 capsules per night as directed. I have advised patient she can resume her Doxepin and take 225 mg tablets at bedtime. Patient reports her cardiac ablation procedure was successful. Prescribed Doxepin 50 mg capsule nightly and patient will follow up with this provider on 11/23/2022.

## 2022-11-04 ENCOUNTER — PATIENT OUTREACH (OUTPATIENT)
Dept: CASE MANAGEMENT | Age: 62
End: 2022-11-04

## 2022-11-04 NOTE — PROGRESS NOTES
Date/Time:  2022 4:29 PM    Method of communication with patient:phone    [facility] ([facility]) contacted the patient by telephone to perform Ambulatory Care Coordination. Verified name and  (PHI) with patient as identifiers. Provided introduction to self, and explanation of the Ambulatory Care Manager's role. Encounter Note:   Spoke with patient - Patient states doing well at present    Reviewed upcoming appointments - verified that patient could attend appointments  Further questions answered as needed, patient has [facility] contact information  Any changes in med therapy noted in med list - no complications or side effects    Depression screen done - PHQ2 score -  0  No issues following ablation - no issues regarding HTN      Reviewed most recent clinic visit w/ patient who verbalized understanding. Patient given an opportunity to ask questions. Notes / Challenges    NA       The patient agrees to contact the PCP office or the [address] [place] [address] for questions related to their healthcare. Provided contact information for future reference. Disease Specific:   N/A    Home Health Active: No    DME Active: No    Barriers to care? lack of knowledge about disease    ACP Decision maker    Advance Care Planning 2022   Confirm Advance Directive None   Patient Would Like to Complete Advance Directive No         Medication(s):   Medication reconciliation review was performed with patient, who verbalizes understanding of administration of home medications. There were no barriers to obtaining medications identified at this time. Current Outpatient Medications   Medication Sig    doxepin (SINEquan) 50 mg capsule Take 1 Capsule by mouth nightly. LORazepam (ATIVAN) 0.5 mg tablet Take 1 Tablet by mouth daily as needed for Anxiety. pantoprazole (PROTONIX) 20 mg tablet Take 20 mg by mouth daily. hydroCHLOROthiazide (HYDRODIURIL) 12.5 mg tablet Take 12.5 mg by mouth daily.  in the morning furosemide (LASIX) 20 mg tablet Take 20 mg by mouth daily. 1/2 tab    metoprolol tartrate (LOPRESSOR) 25 mg tablet Take 25 mg by mouth daily as needed. Patient stated for edema (Patient not taking: Reported on 9/20/2022)    apixaban (ELIQUIS) 5 mg tablet Take 1 Tablet by mouth every twelve (12) hours. dilTIAZem ER (Cardizem CD) 120 mg capsule Take 1 Capsule by mouth daily. No current facility-administered medications for this visit.        [facility] follow up appointment(s):   Future Appointments   Date Time Provider Tanya Perez   11/23/2022  2:30 PM Bladimir Parents, NP [facility] AM[facility]           Goals Addressed                   This Visit's Progress     Attend follow up appointments on schedule   On track     Prepare patients and caregivers for end of life decisions (ie. need for hospice, pain management, symptom relief, advance directives etc.)   On track     Take all medications as ordered   On track

## 2022-11-07 RX ORDER — DOXEPIN HYDROCHLORIDE 25 MG/1
25 CAPSULE ORAL
Qty: 90 CAPSULE | Refills: 1 | Status: SHIPPED | OUTPATIENT
Start: 2022-11-07

## 2022-11-07 RX ORDER — DOXEPIN HYDROCHLORIDE 25 MG/1
25 CAPSULE ORAL
COMMUNITY
End: 2022-11-07 | Stop reason: SDUPTHER

## 2022-11-07 NOTE — TELEPHONE ENCOUNTER
Patient calls to say that she can't tolerate the 50mg of doxepin and needs to have a rx for the 25 sent to her pharmacy

## 2022-11-21 ENCOUNTER — PATIENT OUTREACH (OUTPATIENT)
Dept: CASE MANAGEMENT | Age: 62
End: 2022-11-21

## 2022-11-21 NOTE — PROGRESS NOTES
Date/Time:  2022 4:29 PM    Method of communication with patient:phone    [facility] ([facility]) contacted the patient by telephone to perform Ambulatory Care Coordination. Verified name and  (PHI) with patient as identifiers. Provided introduction to self, and explanation of the Ambulatory Care Manager's role. Encounter Note:   Spoke with patient - Patient states doing well at present    Reviewed upcoming appointments - verified that patient could attend appointments  Further questions answered as needed, patient has [facility] contact information  Any changes in med therapy noted in med list - no complications or side effects    Depression screen done - PHQ2 score -  0  Patient remains very stable following ablation - no issues regarding HTN      Reviewed most recent clinic visit w/ patient who verbalized understanding. Patient given an opportunity to ask questions. Notes / Challenges    NA       The patient agrees to contact the PCP office or the [address] [place] [address] for questions related to their healthcare. Provided contact information for future reference. Disease Specific:   N/A    Home Health Active: No    DME Active: No    Barriers to care? lack of knowledge about disease    ACP Decision maker    Advance Care Planning 2022   Confirm Advance Directive None   Patient Would Like to Complete Advance Directive No         Medication(s):   Medication reconciliation review was performed with patient, who verbalizes understanding of administration of home medications. There were no barriers to obtaining medications identified at this time. Current Outpatient Medications   Medication Sig    doxepin (SINEquan) 25 mg capsule Take 1 Capsule by mouth nightly. LORazepam (ATIVAN) 0.5 mg tablet Take 1 Tablet by mouth daily as needed for Anxiety. pantoprazole (PROTONIX) 20 mg tablet Take 20 mg by mouth daily.     hydroCHLOROthiazide (HYDRODIURIL) 12.5 mg tablet Take 12.5 mg by mouth daily. in the morning    furosemide (LASIX) 20 mg tablet Take 20 mg by mouth daily. 1/2 tab    metoprolol tartrate (LOPRESSOR) 25 mg tablet Take 25 mg by mouth daily as needed. Patient stated for edema (Patient not taking: Reported on 9/20/2022)    apixaban (ELIQUIS) 5 mg tablet Take 1 Tablet by mouth every twelve (12) hours. dilTIAZem ER (Cardizem CD) 120 mg capsule Take 1 Capsule by mouth daily. No current facility-administered medications for this visit.        [facility] follow up appointment(s):   Future Appointments   Date Time Provider Tanya Perez   11/23/2022  2:30 PM Thelma Roblero NP [facility] BS AMB           Goals Addressed                   This Visit's Progress     Prepare patients and caregivers for end of life decisions (ie. need for hospice, pain management, symptom relief, advance directives etc.)   On track     Take all medications as ordered   On track

## 2022-11-23 ENCOUNTER — OFFICE VISIT (OUTPATIENT)
Dept: FAMILY MEDICINE CLINIC | Age: 62
End: 2022-11-23
Payer: COMMERCIAL

## 2022-11-23 VITALS
HEART RATE: 91 BPM | WEIGHT: 214.6 LBS | SYSTOLIC BLOOD PRESSURE: 137 MMHG | OXYGEN SATURATION: 98 % | DIASTOLIC BLOOD PRESSURE: 80 MMHG | TEMPERATURE: 98.1 F | HEIGHT: 63 IN | BODY MASS INDEX: 38.02 KG/M2

## 2022-11-23 DIAGNOSIS — Z13.220 SCREENING CHOLESTEROL LEVEL: ICD-10-CM

## 2022-11-23 DIAGNOSIS — K11.7 DRUG-INDUCED XEROSTOMIA: Primary | ICD-10-CM

## 2022-11-23 DIAGNOSIS — T50.905A DRUG-INDUCED XEROSTOMIA: Primary | ICD-10-CM

## 2022-11-23 DIAGNOSIS — F51.01 PRIMARY INSOMNIA: ICD-10-CM

## 2022-11-23 DIAGNOSIS — B35.3 TINEA PEDIS OF BOTH FEET: ICD-10-CM

## 2022-11-23 PROBLEM — D69.2 PURPURA (HCC): Status: RESOLVED | Noted: 2020-11-24 | Resolved: 2022-11-23

## 2022-11-23 PROCEDURE — 3074F SYST BP LT 130 MM HG: CPT | Performed by: NURSE PRACTITIONER

## 2022-11-23 PROCEDURE — 99214 OFFICE O/P EST MOD 30 MIN: CPT | Performed by: NURSE PRACTITIONER

## 2022-11-23 PROCEDURE — 3078F DIAST BP <80 MM HG: CPT | Performed by: NURSE PRACTITIONER

## 2022-11-23 RX ORDER — SALIVA STIMULANT COMB. NO.3
1 SPRAY, NON-AEROSOL (ML) MUCOUS MEMBRANE
Qty: 2 EACH | Refills: 2 | Status: SHIPPED | OUTPATIENT
Start: 2022-11-23

## 2022-11-23 RX ORDER — PRENATAL VIT 91/IRON/FOLIC/DHA 28-975-200
1 COMBINATION PACKAGE (EA) ORAL 2 TIMES DAILY
Qty: 30 G | Refills: 3 | Status: SHIPPED | OUTPATIENT
Start: 2022-11-23

## 2022-11-23 RX ORDER — SUCRALFATE 1 G/10ML
SUSPENSION ORAL
COMMUNITY
Start: 2022-11-07

## 2022-11-23 NOTE — PROGRESS NOTES
Yesenia Diaz presents today for   Chief Complaint   Patient presents with    Follow-up     Blood pressure         Is someone accompanying this pt? No     Is the patient using any DME equipment during OV? No     Depression Screening:  3 most recent PHQ Screens 11/23/2022   Little interest or pleasure in doing things Not at all   Feeling down, depressed, irritable, or hopeless Several days   Total Score PHQ 2 1   Trouble falling or staying asleep, or sleeping too much -   Feeling tired or having little energy -   Poor appetite, weight loss, or overeating -   Feeling bad about yourself - or that you are a failure or have let yourself or your family down -   Trouble concentrating on things such as school, work, reading, or watching TV -   Moving or speaking so slowly that other people could have noticed; or the opposite being so fidgety that others notice -   Thoughts of being better off dead, or hurting yourself in some way -   PHQ 9 Score -   How difficult have these problems made it for you to do your work, take care of your home and get along with others -       Learning Assessment:  Learning Assessment 9/8/2020   PRIMARY LEARNER Patient   HIGHEST LEVEL OF EDUCATION - PRIMARY LEARNER  SOME COLLEGE   BARRIERS PRIMARY LEARNER NONE   PRIMARY LANGUAGE ENGLISH   LEARNER PREFERENCE PRIMARY LISTENING   ANSWERED BY patient   RELATIONSHIP SELF       Fall Risk  Fall Risk Assessment, last 12 mths 11/24/2020   Able to walk? Yes   Fall in past 12 months?  No       ADL  ADL Assessment 9/20/2022   Feeding yourself No Help Needed   Getting from bed to chair No Help Needed   Getting dressed No Help Needed   Bathing or showering No Help Needed   Walk across the room (includes cane/walker) No Help Needed   Using the telphone No Help Needed   Taking your medications No Help Needed   Preparing meals No Help Needed   Managing money (expenses/bills) No Help Needed   Moderately strenuous housework (laundry) No Help Needed   Shopping for personal items (toiletries/medicines) No Help Needed   Shopping for groceries No Help Needed   Driving No Help Needed   Climbing a flight of stairs No Help Needed   Getting to places beyond walking distances No Help Needed       Health Maintenance reviewed and discussed and ordered per Provider. Health Maintenance Due   Topic Date Due    Hepatitis C Screening  Never done    DTaP/Tdap/Td series (1 - Tdap) Never done    Cervical cancer screen  Never done    Colorectal Cancer Screening Combo  Never done    Shingrix Vaccine Age 50> (1 of 2) Never done    COVID-19 Vaccine (4 - Booster for Moderna series) 02/09/2022    Flu Vaccine (1) 08/01/2022   . Coordination of Care:  1. \"Have you been to the ER, urgent care clinic since your last visit? Hospitalized since your last visit? \" No    2. \"Have you seen or consulted any other health care providers outside of the [address] [place] [address] [place] since your last visit? \" Yes Where: [facility]       3. For patients aged 39-70: Has the patient had a colonoscopy? Yes - Care Gap present. Rooming MA/LPN to request most recent results Cologuard    If the patient is female:    4. For patients aged 41-77: Has the patient had a mammogram within the past 2 years? Yes - no Care Gap present    5. For patients aged 21-65: Has the patient had a pap smear? Yes - Care Gap present.  Rooming MA/LPN to request most recent results 10 years ago [facility]

## 2022-11-23 NOTE — PROGRESS NOTES
History of Present Illness  Adelso Galan is a 57 y.o. female who presents today for:    Chief Complaint   Patient presents with    Follow-up     Blood pressure         Past Medical History  Past Medical History:   Diagnosis Date    Arrhythmia     A fib    Hypertension         Surgical History  Past Surgical History:   Procedure Laterality Date    HX KNEE REPLACEMENT Right 03/01/2020    HX ORTHOPAEDIC Bilateral     carpel tunnel    HX OTHER SURGICAL      lap band    HX TUBAL LIGATION          Current Medications  Current Outpatient Medications   Medication Sig    sucralfate (CARAFATE) 100 mg/mL suspension TAKE 10 ML BY MOUTH 4 TIMES DAILY    saliva stimulant (Biotene Moisturizing Mouth) spry Take 1 Spray by mouth every four (4) hours as needed for PRN Reason (Other) (Excessive dry mouth). terbinafine HCL (LAMISIL) 1 % topical cream Apply 1 g to affected area two (2) times a day. doxepin (SINEquan) 25 mg capsule Take 1 Capsule by mouth nightly. pantoprazole (PROTONIX) 20 mg tablet Take 20 mg by mouth daily. hydroCHLOROthiazide (HYDRODIURIL) 12.5 mg tablet Take 12.5 mg by mouth daily. in the morning    furosemide (LASIX) 20 mg tablet Take 20 mg by mouth daily. 1/2 tab    apixaban (ELIQUIS) 5 mg tablet Take 1 Tablet by mouth every twelve (12) hours. dilTIAZem ER (Cardizem CD) 120 mg capsule Take 1 Capsule by mouth daily. No current facility-administered medications for this visit.        Allergies/Drug Reactions  No Known Allergies     Family History  Family History   Problem Relation Age of Onset    No Known Problems Mother     Heart Disease Father         Social History  Social History     Tobacco Use    Smoking status: Never    Smokeless tobacco: Never   Vaping Use    Vaping Use: Never used   Substance Use Topics    Alcohol use: Not Currently    Drug use: Never        Health Maintenance   Topic Date Due    Hepatitis C Screening  Never done    DTaP/Tdap/Td series (1 - Tdap) Never done Cervical cancer screen  Never done    Colorectal Cancer Screening Combo  Never done    Shingrix Vaccine Age 50> (1 of 2) Never done    COVID-19 Vaccine (4 - Booster for Moderna series) 02/09/2022    Flu Vaccine (1) 08/01/2022    Depression Monitoring  11/23/2023    Breast Cancer Screen Mammogram  06/16/2024    Lipid Screen  10/15/2026    Pneumococcal 0-64 years  Aged [place] History   Administered Date(s) Administered    COVID-19, MODERNA BLUE border, Primary or Immunocompromised, (age 18y+), IM, 100 mcg/0.5mL 01/12/2021, 02/09/2021, 12/15/2021    Influenza Vaccine 11/10/2021     Physical Exam  Vital signs:   Vitals:    11/23/22 1459   BP: 137/80   Pulse: 91   Temp: 98.1 °F (36.7 °C)   TempSrc: Temporal   SpO2: 98%   Weight: 214 lb 9.6 oz (97.3 kg)   Height: 5' 3\" (1.6 m)     Laboratory/Tests:    Spoke with patient via phone call on 10/28/2022 at 1 PM.  Advised patient to discontinue use of her prescribed Lorazepam.  Patient reports she has not been taking her prescribed Doxepin 25 mg capsule nightly since initiation of oral Lorazepam.  She reports she did not increase her prescribed Doxepin 25 mg capsule to 2 capsules per night as directed. I have advised patient she can resume her Doxepin and take 25 mg tablets at bedtime. Patient reports her cardiac ablation procedure was successful. Advised her to take Doxepin 25 mg, 2 capsules nightly and patient will follow up with this provider on 11/23/2022. She reports she used Doxepin 25 mg, 2 tablets and felt daytime somnolence. Will continue Doxepin 25 mg QHS at this visit    Patient reports there were polyps discovered during upper GI procedure. She will follow up with Dr. Vira Wright, gastroenterologist, in February 2023. Patient reports excessive dry mouth with increase in prescribed Doxepin. Will order Biotene at this visit. Patient has bilateral heel area tinea pedis and will prescribe Terbinafine 1% topical cream at this visit.      Patient reports she is still taking Diltiazem 120 mg capsule daily and she will follow up with cardiology on 12/22/2022. Assessment/Plan:    Drug-induced xerostomia. Prescribed Biotene moisturizing mouth spray, instill 1 spray every 4 hours as needed for management of drug-induced xerostomia. Tinea pedis. Prescribed Terbinafine 1% topical cream, apply 1 g twice daily to affected area for management of tinea pedis. Primary insomnia. Continue Doxepin 25 mg capsule nightly for management of primary insomnia. I have discussed the diagnosis with the patient and the intended plan as seen in the above orders. The patient has received an after-visit summary and questions were answered concerning future plans. I have discussed medication side effects and warnings with the patient as well. I have reviewed the plan of care with the patient, accepted their input and they are in agreement with the treatment goals.        Savanna Rizo NP  November 29, 2022

## 2022-12-02 ENCOUNTER — PATIENT OUTREACH (OUTPATIENT)
Dept: CASE MANAGEMENT | Age: 62
End: 2022-12-02

## 2022-12-02 NOTE — PROGRESS NOTES
Date/Time:  2022 4:29 PM    Method of communication with patient:phone    [facility] ([facility]) contacted the patient by telephone to perform Ambulatory Care Coordination. Verified name and  (PHI) with patient as identifiers. Provided introduction to self, and explanation of the Ambulatory Care Manager's role. Encounter Note:   Spoke with patient - Patient states doing well at present    Reviewed upcoming appointments - verified that patient could attend appointments  Further questions answered as needed, patient has [facility] contact information  Any changes in med therapy noted in med list - no complications or side effects    Depression screen done - PHQ2 score -  0  Patient remains very stable following ablation - no report of chest pain or irregular heart beat. Remains on Eliquis and awaiting Cardiology advice as to when Eliquis may be stopped prior to dental procedure. Reviewed most recent clinic visit w/ patient who verbalized understanding. Patient given an opportunity to ask questions. Notes / Challenges    NA       The patient agrees to contact the PCP office or the [address] [place] [address] for questions related to their healthcare. Provided contact information for future reference. Disease Specific:   N/A    Home Health Active: No    DME Active: No    Barriers to care? lack of knowledge about disease    ACP Decision maker    Advance Care Planning 2022   Confirm Advance Directive None   Patient Would Like to Complete Advance Directive No         Medication(s):   Medication reconciliation review was performed with patient, who verbalizes understanding of administration of home medications. There were no barriers to obtaining medications identified at this time.          Current Outpatient Medications   Medication Sig    sucralfate (CARAFATE) 100 mg/mL suspension TAKE 10 ML BY MOUTH 4 TIMES DAILY    saliva stimulant (Biotene Moisturizing Mouth) spry Take 1 Spray by mouth every four (4) hours as needed for PRN Reason (Other) (Excessive dry mouth). terbinafine HCL (LAMISIL) 1 % topical cream Apply 1 g to affected area two (2) times a day. doxepin (SINEquan) 25 mg capsule Take 1 Capsule by mouth nightly. pantoprazole (PROTONIX) 20 mg tablet Take 20 mg by mouth daily. hydroCHLOROthiazide (HYDRODIURIL) 12.5 mg tablet Take 12.5 mg by mouth daily. in the morning    furosemide (LASIX) 20 mg tablet Take 20 mg by mouth daily. 1/2 tab    apixaban (ELIQUIS) 5 mg tablet Take 1 Tablet by mouth every twelve (12) hours. dilTIAZem ER (Cardizem CD) 120 mg capsule Take 1 Capsule by mouth daily. No current facility-administered medications for this visit.        [facility] follow up appointment(s):   Future Appointments   Date Time Provider Tanya Perez   5/26/2023  3:30 PM Jessy Fisher NP [facility] BS AMB           Goals Addressed                   This Visit's Progress     Attend follow up appointments on schedule   On track     Prepare patients and caregivers for end of life decisions (ie. need for hospice, pain management, symptom relief, advance directives etc.)   On track     Take all medications as ordered   On track

## 2022-12-08 ENCOUNTER — PATIENT OUTREACH (OUTPATIENT)
Dept: CASE MANAGEMENT | Age: 62
End: 2022-12-08

## 2022-12-08 NOTE — LETTER
12/8/2022 2:34 PM    [address] [id number]        Gumaro Olivas        Thank you for the opportunity to help you understand and manage of your overall health. I am committed to providing you excellent care with resources and education regarding your health and wellbeing. Please contact me at [phone] if you have further needs or if I can assist with community resources or other aspects of your care in the future. I appreciate the confidence you've shown by allowing me to assist with your healthcare needs.  At this point you have graduated from my routine calls and I wish you and your family the very best.     Sincerely,         Ras Stewart, [facility], [address] [facility]

## 2022-12-08 NOTE — PROGRESS NOTES
Patient has graduated from the Complex Case Management  program on 12/8/2022. Patient/family has the ability to self-manage at this time. Care management goals have been completed. No further Ambulatory Care Manager follow up scheduled. Goals Addressed                   This Visit's Progress     COMPLETED: Attend follow up appointments on schedule        COMPLETED: Prepare patients and caregivers for end of life decisions (ie. need for hospice, pain management, symptom relief, advance directives etc.)        COMPLETED: Take all medications as ordered               Patient has Ambulatory Care Manager's contact information for any further questions, concerns, or needs.   Patients upcoming visits:    Future Appointments   Date Time Provider Tanya Perez   5/26/2023  3:30 PM Lela Estevez NP [facility] BS AMB

## 2023-01-24 ENCOUNTER — OFFICE VISIT (OUTPATIENT)
Dept: ORTHOPEDIC SURGERY | Age: 63
End: 2023-01-24
Payer: COMMERCIAL

## 2023-01-24 DIAGNOSIS — M17.12 OSTEOARTHRITIS OF LEFT KNEE, UNSPECIFIED OSTEOARTHRITIS TYPE: ICD-10-CM

## 2023-01-24 DIAGNOSIS — M25.562 LEFT KNEE PAIN, UNSPECIFIED CHRONICITY: Primary | ICD-10-CM

## 2023-01-24 DIAGNOSIS — M67.431 GANGLION OF RIGHT WRIST: ICD-10-CM

## 2023-01-24 RX ORDER — LIDOCAINE HYDROCHLORIDE 10 MG/ML
9 INJECTION INFILTRATION; PERINEURAL ONCE
Status: COMPLETED | OUTPATIENT
Start: 2023-01-24 | End: 2023-01-24

## 2023-01-24 RX ORDER — PROPRANOLOL HYDROCHLORIDE 10 MG/1
10 TABLET ORAL DAILY
COMMUNITY
Start: 2023-01-19

## 2023-01-24 RX ORDER — TRIAMCINOLONE ACETONIDE 40 MG/ML
40 INJECTION, SUSPENSION INTRA-ARTICULAR; INTRAMUSCULAR ONCE
Status: COMPLETED | OUTPATIENT
Start: 2023-01-24 | End: 2023-01-24

## 2023-01-24 RX ORDER — OMEPRAZOLE 20 MG/1
TABLET, ORALLY DISINTEGRATING, DELAYED RELEASE ORAL
COMMUNITY

## 2023-01-24 RX ADMIN — LIDOCAINE HYDROCHLORIDE 9 ML: 10 INJECTION INFILTRATION; PERINEURAL at 09:00

## 2023-01-24 RX ADMIN — TRIAMCINOLONE ACETONIDE 40 MG: 40 INJECTION, SUSPENSION INTRA-ARTICULAR; INTRAMUSCULAR at 09:00

## 2023-01-24 NOTE — PROGRESS NOTES
Name: Stephanie Fountain    : 1960     Service Dept: [address] [place] [address] Sports Medicine    Chief Complaint   Patient presents with    Knee Pain        There were no vitals taken for this visit. No Known Allergies     Current Outpatient Medications   Medication Sig Dispense Refill    omeprazole 20 mg TbLD       propranoloL (INDERAL) 10 mg tablet Take 10 mg by mouth daily. sucralfate (CARAFATE) 100 mg/mL suspension TAKE 10 ML BY MOUTH 4 TIMES DAILY      saliva stimulant (Biotene Moisturizing Mouth) spry Take 1 Spray by mouth every four (4) hours as needed for PRN Reason (Other) (Excessive dry mouth). 2 Each 2    terbinafine HCL (LAMISIL) 1 % topical cream Apply 1 g to affected area two (2) times a day. 30 g 3    doxepin (SINEquan) 25 mg capsule Take 1 Capsule by mouth nightly. 90 Capsule 1    pantoprazole (PROTONIX) 20 mg tablet Take 20 mg by mouth daily. hydroCHLOROthiazide (HYDRODIURIL) 12.5 mg tablet Take 12.5 mg by mouth daily. in the morning      furosemide (LASIX) 20 mg tablet Take 20 mg by mouth daily. 1/2 tab      apixaban (ELIQUIS) 5 mg tablet Take 1 Tablet by mouth every twelve (12) hours. 60 Tablet 0    dilTIAZem ER (Cardizem CD) 120 mg capsule Take 1 Capsule by mouth daily.  30 Capsule 0     Current Facility-Administered Medications   Medication Dose Route Frequency Provider Last Rate Last Admin    [COMPLETED] lidocaine (XYLOCAINE) 10 mg/mL (1 %) injection 9 mL  9 mL Other ONCE Timothy Pimentel MD   9 mL at 23 09    [COMPLETED] triamcinolone acetonide (KENALOG-40) 40 mg/mL injection 40 mg  40 mg Intra artICUlar ONCE Timothy Pimentel MD   40 mg at 23 0900      Patient Active Problem List   Diagnosis Code    Essential hypertension I10    Panic attack F41.0    Bariatric surgery status Z98.84    Abnormal laboratory test R89.9    Elevated blood sugar R73.9    Fitting and adjustment of gastric lap band Z46.51    GERD (gastroesophageal reflux disease) K21.9    Obesity (BMI 30-39. 9) E66.9    Osteoarthrosis, unspecified whether generalized or localized, unspecified site M19.90    LAP-BAND surgery status Z98.84    TIA (transient ischemic attack) G45.9    Atrial fibrillation with RVR (HCC) I48.91    Atrial fibrillation (HCC) I48.91    History of cardiac radiofrequency ablation Z98.890      Family History   Problem Relation Age of Onset    No Known Problems Mother     Heart Disease Father       Social History     Socioeconomic History    Marital status:    Tobacco Use    Smoking status: Never    Smokeless tobacco: Never   Vaping Use    Vaping Use: Never used   Substance and Sexual Activity    Alcohol use: Not Currently    Drug use: Never      Past Surgical History:   Procedure Laterality Date    HX KNEE REPLACEMENT Right 03/01/2020    HX ORTHOPAEDIC Bilateral     carpel tunnel    HX OTHER SURGICAL      lap band    HX TUBAL LIGATION        Past Medical History:   Diagnosis Date    Arrhythmia     A fib    Hypertension         I have reviewed and agree with [address] [place] [address] Nw and ROS and intake form in chart and the record furthermore I have reviewed prior medical record(s) regarding this patients care during this appointment. Review of Systems:   Patient is a pleasant appearing individual, appropriately dressed, well hydrated, well nourished, who is alert, appropriately oriented for age, and in no acute distress with a normal gait and normal affect who does not appear to be in any significant pain. Physical Exam:  Right wrist- Grossly neurovascularly intact with good cap refill, full range of motion, normal strength, small palpable mass on volar aspect of wrist, no erythema, no echymosis, skin intact with no lesions noted. Left wrist - No point tenderness, Full range of motion, No instability, No Weakness, No, skin lesions, No swelling, No instability, Grossly neurovascularly intact.      Left Knee -Decrease range of motion with flexion, Knee arc of greater than 50 degrees, Some crepitation, Grossly neurovascularly intact, Good cap refill, No skin lesion, Moderate swelling, No gross instability, Some quadriceps weakness, greater than 50 degree arc    Right Knee - Full Range of Motion, No crepitation, Grossly neurovascularly intact, Good cap refill, No skin lesion, No swelling, No gross instability, No quadriceps weakness     Procedure Documentation:    I discussed in detail the risks, benefits and complications of an injection which included but are not limited to infection, skin reactions, hot swollen joint, and anaphylaxis with the patient. The patient verbalized understanding and gave informed consent for the injection. The patient's knee was flexed to 90° and the skin prepped using sterile alcohol solution. A sterile needle was inserted into the left knee and the mixture of 9 mL Lidocaine 1%, 1 mL Kenalog 40 mg was injected under sterile technique. The needle was withdrawn and the puncture site sealed with a Band-Aid. Technique: Under sterile conditions a [employer] ultrasound unit with a variable frequency (7.0-14.0 MHz) linear transducer was used to localize the placement of needle into the left knee joint. Findings: Successful needle placement for knee injection. Final images were taken and saved for permanent record. The patient tolerated the injection well. The patient was instructed to call the office immediately if there is any pain, redness, warmth, fever, or chills. Encounter Diagnoses     ICD-10-CM ICD-9-CM   1. Left knee pain, unspecified chronicity  M25.562 719.46   2. Osteoarthritis of left knee, unspecified osteoarthritis type  M17.12 715.96   3. Ganglion of right wrist  M67.431 727.41       HPI:  The patient is here with a chief complaint of left knee pain, right arm pain, throbbing, burning pain especially in the right wrist.    X-rays are unremarkable in the past.    Assessment/Plan:  1. Right ganglion cyst, knee problem.     Plan at this point, I did offer injection, but she wants to wait. For the left knee, moderate OA. Plan will be for cortisone injection. If it helps, that is all we need to do and we will see her back as needed. If she gets worse, she is to give me a call and we will go from there. As part of continued conservative pain management options the patient was advised to utilize Tylenol or OTC NSAIDS as long as it is not medically contraindicated. Return to Office: Follow-up and Dispositions    Return if symptoms worsen or fail to improve. Administrations This Visit       lidocaine (XYLOCAINE) 10 mg/mL (1 %) injection 9 mL       Admin Date  01/24/2023 Action  Given Dose  9 mL Route  Other Administered By  Maykel Beckham LPN              triamcinolone acetonide (KENALOG-40) 40 mg/mL injection 40 mg       Admin Date  01/24/2023 Action  Given Dose  40 mg Route  Intra artICUlar Administered By  Maykel Beckham LPN                   Scribed by Austin Smith LPN as dictated by [facility] NELL Murcia MD.  Documentation, performed by, True and Accepted Timothy Murcia MD

## 2023-01-24 NOTE — PATIENT INSTRUCTIONS
Knee Arthritis: Care Instructions  Overview     Knee arthritis is a breakdown of the cartilage that cushions your knee joint. When the cartilage wears down, your bones rub against each other. This causes pain and stiffness. Knee arthritis tends to get worse with time. Treatment for knee arthritis involves reducing pain, making the leg muscles stronger, and staying at a healthy body weight. The treatment usually does not improve the health of the cartilage, but it can reduce pain and improve how well your knee works. You can take simple measures to protect your knee joints, ease your pain, and help you stay active. Follow-up care is a key part of your treatment and safety. Be sure to make and go to all appointments, and call your doctor if you are having problems. It's also a good idea to know your test results and keep a list of the medicines you take. How can you care for yourself at home? Know that knee arthritis will cause more pain on some days than on others. Stay at a healthy weight. Lose weight if you are overweight. When you stand up, the pressure on your knees from every pound of body weight is multiplied four times. So if you lose 10 pounds, you will reduce the pressure on your knees by 40 pounds. Talk to your doctor or physical therapist about exercises that will help ease joint pain. Stretch to help prevent stiffness and to prevent injury before you exercise. You may enjoy gentle forms of yoga to help keep your knee joints and muscles flexible. Walk instead of jog. Ride a bike. This makes your thigh muscles stronger and takes pressure off your knee. Wear well-fitting and comfortable shoes. Exercise in chest-deep water. This can help you exercise longer with less pain. Avoid exercises that include squatting or kneeling. They can put a lot of strain on your knees. Talk to your doctor to make sure that the exercise you do is not making the arthritis worse.   Do not sit for long periods of [FreeTextEntry1] :  adequate wt gain\par disc feeds continue to attempt to have baby latch, will f/u in 2 wks at 1 mo [facility] or sooner if concerns\par \par Audiology exam next week time. Try to walk once in a while to keep your knee from getting stiff. Ask your doctor or physical therapist whether shoe inserts may reduce your arthritis pain. If you can afford it, get new athletic shoes at least every year. This can help reduce the strain on your knees. Use a device to help you do everyday activities. A cane or walking stick can help you keep your balance when you walk. Hold the cane or walking stick in the hand opposite the painful knee. If you feel like you may fall when you walk, try using crutches or a front-wheeled walker. These can prevent falls that could cause more damage to your knee. A knee brace may help keep your knee stable and prevent pain. You also can use other things to make life easier, such as a higher toilet seat and handrails in the bathtub or shower. Take pain medicines exactly as directed. Do not wait until you are in severe pain. You will get better results if you take it sooner. If you are not taking a prescription pain medicine, take an over-the-counter medicine such as acetaminophen (Tylenol), ibuprofen (Advil, Motrin), or naproxen (Aleve). Read and follow all instructions on the label. Do not take two or more pain medicines at the same time unless the doctor told you to. Many pain medicines have acetaminophen, which is Tylenol. Too much acetaminophen (Tylenol) can be harmful. Tell your doctor if you take a blood thinner, have diabetes, or have allergies to shellfish. Ask your doctor if you might benefit from a shot of steroid medicine into your knee. This may provide pain relief for several months. Many people take the supplements glucosamine and chondroitin for osteoarthritis. Some people feel they help, but the medical research does not show that they work. Talk to your doctor before you take these supplements. When should you call for help?    Call your doctor now or seek immediate medical care if:    You have sudden swelling, warmth, or pain in your knee.     You have knee pain and a fever or rash. You have such bad pain that you cannot use your knee. Watch closely for changes in your health, and be sure to contact your doctor if you have any problems. Where can you learn more? Go to http://www.gray.com/  Enter W187 in the search box to learn more about \"Knee Arthritis: Care Instructions. \"  Current as of: March 9, 2022               Content Version: 13.4  © 2006-2022 [employer]. Care instructions adapted under license by [employer] (which disclaims liability or warranty for this information). If you have questions about a medical condition or this instruction, always ask your healthcare professional. [place] [address] any warranty or liability for your use of this information. Hand Pain: Care Instructions  Your Care Instructions     Common causes of hand pain are overuse and injuries, such as might happen during sports or home repair projects. Everyday wear and tear, especially as you get older, also can cause hand pain. Most minor hand injuries will heal on their own, and home treatment is usually all you need to do. If you have sudden and severe pain, you may need tests and treatment. Follow-up care is a key part of your treatment and safety. Be sure to make and go to all appointments, and call your doctor if you are having problems. It's also a good idea to know your test results and keep a list of the medicines you take. How can you care for yourself at home? Take pain medicines exactly as directed. If the doctor gave you a prescription medicine for pain, take it as prescribed. If you are not taking a prescription pain medicine, ask your doctor if you can take an over-the-counter medicine. Rest and protect your hand. Take a break from any activity that may cause pain. Put ice or a cold pack on your hand for 10 to 20 minutes at a time.  Put a thin cloth between the ice and your skin. Prop up the sore hand on a pillow when you ice it or anytime you sit or lie down during the next 3 days. Try to keep it above the level of your heart. This will help reduce swelling. If your doctor recommends a sling, splint, or elastic bandage to support your hand, wear it as directed. When should you call for help? Call 911 anytime you think you may need emergency care. For example, call if:    Your hand turns cool or pale or changes color. Call your doctor now or seek immediate medical care if:    You cannot move your hand. Your hand pops, moves out of its normal position, and then returns to its normal position. You have signs of infection, such as: Increased pain, swelling, warmth, or redness. Red streaks leading from the sore area. Pus draining from a place on your hand. A fever. Your hand feels numb or tingly. Watch closely for changes in your health, and be sure to contact your doctor if:    Your hand feels unstable when you try to use it. You do not get better as expected. You have any new symptoms, such as swelling. Bruises from an injury to your hand last longer than 2 weeks. Where can you learn more? Go to http://www.Holidu.com/  Enter R273 in the search box to learn more about \"Hand Pain: Care Instructions. \"  Current as of: March 9, 2022               Content Version: 13.4  © 3135-9324 [employer]. Care instructions adapted under license by [employer] (which disclaims liability or warranty for this information). If you have questions about a medical condition or this instruction, always ask your healthcare professional. [place] [address] any warranty or liability for your use of this information.

## 2023-01-24 NOTE — LETTER
1/25/2023    Patient: Tonio Ferrsi   YOB: 1960   Date of Visit: 1/24/2023     Mauricio Petit NP  [address]    Dear Mauricio Petit NP,      Thank you for referring Ms. Kristie Nickerson to [address] [place] [address] AND [facility] for evaluation. My notes for this consultation are attached. If you have questions, please do not hesitate to call me. I look forward to following your patient along with you.       Sincerely,    Josselin Reagan MD

## 2023-01-24 NOTE — LETTER
Yumiko Lei   1960   [id number]       1/24/2023       I hereby authorize and direct Timothy Johnson MD, Delvis Ta, and whomever he may designate as his associate to perform upon myself the following procedure:    Injection of: Kenalog, Supartz, Euflexxa, Orthovisc in the Right/Left ____________________. If any unforeseen condition arises in the course of the procedure, I further authorize him and his associated and/or assistant(s) to do whatever he/she deems advisable. The nature, purpose, benefits, risks, side effects, likelihood of achieving goals, and potential problems that might occur during recuperation, risks for not receiving the proposed care, treatment and services and alternatives of the procedure have been fully explained to me by my physician including, but not limited to:    Swelling, joint pain, skin pigment changes, worsening of condition, and failure to improve. I acknowledge that no guarantee or assurance has been made to me as to the results that may be obtained or the likelihood of success.                 _______________________________________     Signature of patient or authorized representative                [employer] [facility] fax: [phone]

## 2023-03-29 ENCOUNTER — APPOINTMENT (OUTPATIENT)
Age: 63
End: 2023-03-29
Payer: COMMERCIAL

## 2023-03-29 ENCOUNTER — HOSPITAL ENCOUNTER (EMERGENCY)
Age: 63
Discharge: HOME | End: 2023-03-29
Attending: FAMILY MEDICINE
Payer: COMMERCIAL

## 2023-03-29 VITALS
BODY MASS INDEX: 40.22 KG/M2 | OXYGEN SATURATION: 99 % | SYSTOLIC BLOOD PRESSURE: 162 MMHG | HEART RATE: 78 BPM | DIASTOLIC BLOOD PRESSURE: 84 MMHG | HEIGHT: 63 IN | WEIGHT: 227 LBS | RESPIRATION RATE: 18 BRPM | TEMPERATURE: 98.1 F
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
## 2023-03-29 DIAGNOSIS — M25.562 ACUTE PAIN OF LEFT KNEE: ICD-10-CM

## 2023-03-29 DIAGNOSIS — M79.601 RIGHT ARM PAIN: ICD-10-CM

## 2023-03-29 DIAGNOSIS — M25.512 ACUTE PAIN OF LEFT SHOULDER: ICD-10-CM

## 2023-03-29 DIAGNOSIS — M25.531 ACUTE PAIN OF RIGHT WRIST: ICD-10-CM

## 2023-03-29 PROCEDURE — 6370000000 HC RX 637 (ALT 250 FOR IP): Performed by: FAMILY MEDICINE

## 2023-03-29 PROCEDURE — 73100 X-RAY EXAM OF WRIST: CPT

## 2023-03-29 PROCEDURE — 73030 X-RAY EXAM OF SHOULDER: CPT

## 2023-03-29 PROCEDURE — 73090 X-RAY EXAM OF FOREARM: CPT

## 2023-03-29 PROCEDURE — 73560 X-RAY EXAM OF KNEE 1 OR 2: CPT

## 2023-03-29 PROCEDURE — 99283 EMERGENCY DEPT VISIT LOW MDM: CPT

## 2023-03-29 RX ORDER — HYDROCODONE BITARTRATE AND ACETAMINOPHEN 5; 325 MG/1; MG/1
1 TABLET ORAL
Status: COMPLETED | OUTPATIENT
Start: 2023-03-29 | End: 2023-03-29

## 2023-03-29 RX ORDER — PROPRANOLOL HYDROCHLORIDE 10 MG/1
10 TABLET ORAL NIGHTLY
COMMUNITY

## 2023-03-29 RX ORDER — OMEPRAZOLE 20 MG/1
20 CAPSULE, DELAYED RELEASE ORAL DAILY
COMMUNITY

## 2023-03-29 RX ADMIN — HYDROCODONE BITARTRATE AND ACETAMINOPHEN 1 TABLET: 5; 325 TABLET ORAL at 02:57

## 2023-03-29 ASSESSMENT — PAIN DESCRIPTION - DESCRIPTORS
DESCRIPTORS: ACHING

## 2023-03-29 ASSESSMENT — LIFESTYLE VARIABLES
HOW MANY STANDARD DRINKS CONTAINING ALCOHOL DO YOU HAVE ON A TYPICAL DAY: PATIENT DOES NOT DRINK
HOW OFTEN DO YOU HAVE A DRINK CONTAINING ALCOHOL: NEVER

## 2023-03-29 ASSESSMENT — ENCOUNTER SYMPTOMS: ABDOMINAL PAIN: 0

## 2023-03-29 ASSESSMENT — PAIN DESCRIPTION - LOCATION
LOCATION: SHOULDER;WRIST
LOCATION: SHOULDER;WRIST
LOCATION: WRIST;SHOULDER

## 2023-03-29 ASSESSMENT — PAIN - FUNCTIONAL ASSESSMENT: PAIN_FUNCTIONAL_ASSESSMENT: 0-10

## 2023-03-29 ASSESSMENT — PAIN SCALES - GENERAL
PAINLEVEL_OUTOF10: 8

## 2023-03-29 ASSESSMENT — PAIN DESCRIPTION - PAIN TYPE: TYPE: ACUTE PAIN

## 2023-03-29 ASSESSMENT — PAIN DESCRIPTION - ORIENTATION: ORIENTATION: LEFT

## 2023-03-29 NOTE — ED NOTES
I have reviewed discharge instructions with the patient. The patient verbalized understanding. Patient encourage to return to ER with any other concerns or emergent care needed. Patient escorted to waiting room with steady gait and no distress noted.      [address] [facility]  03/29/23 2785

## 2023-03-29 NOTE — ED TRIAGE NOTES
Patient reports she was dreaming and rolled off of a high standing bed and hit the hardwood floor. Reports pain to right forearm/wrist, left shoulder and knee. Patient denies LOC.

## 2023-03-29 NOTE — DISCHARGE INSTRUCTIONS
As we spoke, radiology has reviewed the x-rays, as well as myself and have no seen no fractures some arthritis noted. My recommendation is to use the Tylenol arthritis that you have for pain. We will give you a [place] tablet here tonight to help with any pain to the ice may help with some swelling if not improving. Follow-up with Dr. Kayy Dodd for which I have given you the phone number. Call and schedule appointment. Return here to the emergency department if there is any questions or concerns.

## 2023-03-29 NOTE — ED PROVIDER NOTES
reviewed. Constitutional:       Appearance: She is obese. HENT:      Head: Normocephalic and atraumatic. Ears:      Comments: No bleeding from the canals, naris, or mouth     Nose: Nose normal.      Mouth/Throat:      Mouth: Mucous membranes are moist.   Eyes:      Conjunctiva/sclera: Conjunctivae normal.   Cardiovascular:      Rate and Rhythm: Normal rate. Pulmonary:      Effort: Pulmonary effort is normal.   Abdominal:      Tenderness: There is no abdominal tenderness. Musculoskeletal:      Cervical back: Normal range of motion and neck supple. No tenderness. Comments: Patient's bilateral clavicles are nontender to the touch. Right shoulder and right elbow not tender to the touch good flexion extension. Some tenderness at her mid ulna of the right arm, as well as some tenderness at the right wrist.  Able to flex and extend her digits although not make a complete fist of the right hand. Difficulties with translation towards the ulna deviation. No tenderness with pelvic rocking bilaterally, no tenderness with palpation of her right knee, right ankle right foot, no tenderness with palpation of her left foot or left ankle, however severe pain with palpation of her left knee. Palpation of the patient's left hand as well as wrist and elbow yields no tenderness. However good flexion extension is appreciated in her left hand without any grimace. She does have tenderness at the left humeral head. Skin:     General: Skin is warm and dry. Comments: Bruising appreciated mid forearm on the right side   Neurological:      General: No focal deficit present. Mental Status: She is alert and oriented to person, place, and time.    Psychiatric:         Mood and Affect: Mood normal.         Behavior: Behavior normal.       DIAGNOSTIC RESULTS     EKG: All EKG's are interpreted by the Emergency Department Physician who either signs or Co-signs this chart in the absence of a

## 2023-04-03 ENCOUNTER — OFFICE VISIT (OUTPATIENT)
Facility: CLINIC | Age: 63
End: 2023-04-03
Payer: COMMERCIAL

## 2023-04-03 VITALS
TEMPERATURE: 97.9 F | HEIGHT: 63 IN | DIASTOLIC BLOOD PRESSURE: 89 MMHG | WEIGHT: 222.9 LBS | OXYGEN SATURATION: 96 % | SYSTOLIC BLOOD PRESSURE: 154 MMHG | BODY MASS INDEX: 39.5 KG/M2 | HEART RATE: 84 BPM

## 2023-04-03 DIAGNOSIS — M54.6 ACUTE BILATERAL THORACIC BACK PAIN: Primary | ICD-10-CM

## 2023-04-03 DIAGNOSIS — I10 ESSENTIAL HYPERTENSION: ICD-10-CM

## 2023-04-03 DIAGNOSIS — E66.01 SEVERE OBESITY (BMI 35.0-39.9) WITH COMORBIDITY (HCC): ICD-10-CM

## 2023-04-03 PROCEDURE — 3077F SYST BP >= 140 MM HG: CPT | Performed by: STUDENT IN AN ORGANIZED HEALTH CARE EDUCATION/TRAINING PROGRAM

## 2023-04-03 PROCEDURE — 99214 OFFICE O/P EST MOD 30 MIN: CPT | Performed by: STUDENT IN AN ORGANIZED HEALTH CARE EDUCATION/TRAINING PROGRAM

## 2023-04-03 PROCEDURE — 3079F DIAST BP 80-89 MM HG: CPT | Performed by: STUDENT IN AN ORGANIZED HEALTH CARE EDUCATION/TRAINING PROGRAM

## 2023-04-03 RX ORDER — DILTIAZEM HYDROCHLORIDE 120 MG/1
120 CAPSULE, COATED, EXTENDED RELEASE ORAL DAILY
COMMUNITY
Start: 2023-03-13 | End: 2023-04-03

## 2023-04-03 RX ORDER — CYCLOBENZAPRINE HCL 5 MG
5 TABLET ORAL
Qty: 30 TABLET | Refills: 0 | Status: SHIPPED | OUTPATIENT
Start: 2023-04-03 | End: 2023-05-03

## 2023-04-03 SDOH — ECONOMIC STABILITY: FOOD INSECURITY: WITHIN THE PAST 12 MONTHS, YOU WORRIED THAT YOUR FOOD WOULD RUN OUT BEFORE YOU GOT MONEY TO BUY MORE.: NEVER TRUE

## 2023-04-03 SDOH — ECONOMIC STABILITY: INCOME INSECURITY: HOW HARD IS IT FOR YOU TO PAY FOR THE VERY BASICS LIKE FOOD, HOUSING, MEDICAL CARE, AND HEATING?: NOT HARD AT ALL

## 2023-04-03 SDOH — ECONOMIC STABILITY: HOUSING INSECURITY
IN THE LAST 12 MONTHS, WAS THERE A TIME WHEN YOU DID NOT HAVE A STEADY PLACE TO SLEEP OR SLEPT IN A SHELTER (INCLUDING NOW)?: NO

## 2023-04-03 SDOH — ECONOMIC STABILITY: FOOD INSECURITY: WITHIN THE PAST 12 MONTHS, THE FOOD YOU BOUGHT JUST DIDN'T LAST AND YOU DIDN'T HAVE MONEY TO GET MORE.: NEVER TRUE

## 2023-04-03 ASSESSMENT — PATIENT HEALTH QUESTIONNAIRE - PHQ9
SUM OF ALL RESPONSES TO PHQ QUESTIONS 1-9: 0
2. FEELING DOWN, DEPRESSED OR HOPELESS: 0
SUM OF ALL RESPONSES TO PHQ QUESTIONS 1-9: 0
1. LITTLE INTEREST OR PLEASURE IN DOING THINGS: 0
SUM OF ALL RESPONSES TO PHQ9 QUESTIONS 1 & 2: 0

## 2023-04-03 NOTE — PROGRESS NOTES
ED follow up note    SUBJECTIVE    Patient presents for ED follow up. Patient was seen at [facility] emergency department on 3/29/2023. ED discharge diagnoses: Fall from bed. ED note was reviewed. ED course and discharge recommendations:  Patient presented in the ED she had a fall from bed while she was sleeping. She rolled onto the hardwood floor. She took Tylenol prior to coming to the ED. Reports she presented to the ED 1 hour after the fall. She had right wrist pain and forearm pain as well as left shoulder pain on arrival.  Her blood pressure was little bit elevated on presentation to 163/85. She had x-rays done of her right wrist, right radius and ulna, left knee, left shoulder all which were without acute findings. She was given [place] tablets in the ED and a wrist splint as well as advised to follow-up with Ortho outpatient. Reports she went head off the bed. Right wrist hurt with lunar radiation. Now better. We reviewed the recent ED visit in detail. The patient reports doing much better. The patient denies any complaints at this time. The remaining 10 system review of systems was negative unless otherwise noted. The patients past medical history, allergies, medication list, social history, and family medical history were documented, updated, and reviewed in the chart. OBJECTIVE    Blood pressure (!) 154/89, pulse 84, temperature 97.9 °F (36.6 °C), temperature source Temporal, height 5' 3\" (1.6 m), weight 222 lb 14.4 oz (101.1 kg), SpO2 96 %. General:  Alert, cooperative, well appearing, in no apparent distress. Head:  Head is symmetric, normocephalic without evidence of trauma or deformity. Eyes:  The conjunctiva are clear and noninjected. CV:  The heart sounds are regular in rate and rhythm. There is a normal S1 and S2. There or no murmurs, rubs, or gallops. Lungs: Inspiratory and expiratory efforts are full and unlabored.   Lung sounds are clear and equal to

## 2023-04-03 NOTE — PROGRESS NOTES
Dinesh Avendaño presents today for   Chief Complaint   Patient presents with    Follow-up     Cheikh Herrera off her bed Tuesday night        Is someone accompanying this pt? No    Is the patient using any DME equipment during OV? No     Health Maintenance reviewed and discussed and ordered per Provider. Health Maintenance Due   Topic Date Due    HIV screen  Never done    Hepatitis C screen  Never done    DTaP/Tdap/Td vaccine (1 - Tdap) Never done    Cervical cancer screen  Never done    Lipids  Never done    Shingles vaccine (1 of 2) Never done    COVID-19 Vaccine (4 - Booster for Moderna series) 02/09/2022   . Coordination of Care:  1. \"Have you been to the ER, urgent care clinic since your last visit? Hospitalized since your last visit? \" Yes [address] [place] [address] for a fall     2. \"Have you seen or consulted any other health care providers outside of the [address] [place] [address] since your last visit? \" Yes [facility]     3. For patients aged 39-70: Has the patient had a colonoscopy? Yes- No care gap present    If the patient is female:    4. For patients aged 41-77: Has the patient had a mammogram within the past 2 years? Yes- No care gap present    5. For patients aged 21-65: Has the patient had a pap smear?  10 years ago Dr. Tono Kumar

## 2023-05-30 RX ORDER — DOXEPIN HYDROCHLORIDE 25 MG/1
CAPSULE ORAL
Qty: 30 CAPSULE | Refills: 0 | Status: SHIPPED | OUTPATIENT
Start: 2023-05-30 | End: 2023-06-16 | Stop reason: SDUPTHER

## 2023-06-05 ENCOUNTER — OFFICE VISIT (OUTPATIENT)
Age: 63
End: 2023-06-05
Payer: COMMERCIAL

## 2023-06-05 VITALS — HEIGHT: 63 IN | BODY MASS INDEX: 39.34 KG/M2 | WEIGHT: 222 LBS

## 2023-06-05 DIAGNOSIS — M17.12 UNILATERAL PRIMARY OSTEOARTHRITIS, LEFT KNEE: Primary | ICD-10-CM

## 2023-06-05 PROCEDURE — 20611 DRAIN/INJ JOINT/BURSA W/US: CPT | Performed by: NURSE PRACTITIONER

## 2023-06-05 RX ORDER — LIDOCAINE HYDROCHLORIDE 10 MG/ML
9 INJECTION, SOLUTION INFILTRATION; PERINEURAL ONCE
Status: COMPLETED | OUTPATIENT
Start: 2023-06-05 | End: 2023-06-05

## 2023-06-05 RX ORDER — TRIAMCINOLONE ACETONIDE 40 MG/ML
40 INJECTION, SUSPENSION INTRA-ARTICULAR; INTRAMUSCULAR ONCE
Status: COMPLETED | OUTPATIENT
Start: 2023-06-05 | End: 2023-06-05

## 2023-06-05 RX ADMIN — LIDOCAINE HYDROCHLORIDE 9 ML: 10 INJECTION, SOLUTION INFILTRATION; PERINEURAL at 15:02

## 2023-06-05 RX ADMIN — TRIAMCINOLONE ACETONIDE 40 MG: 40 INJECTION, SUSPENSION INTRA-ARTICULAR; INTRAMUSCULAR at 15:02

## 2023-06-05 NOTE — PROGRESS NOTES
Procedure Documentation:    I discussed in detail the risks, benefits and complications of an injection FOR LEFT KNEE OSTEOARTHRITIS which included but are not limited to infection, skin reactions, hot swollen joint, and anaphylaxis with the patient. The patient verbalized understanding and gave informed consent for the injection. The patient's left knee was flexed to 90° and the skin prepped using sterile alcohol solution. A sterile needle was inserted into the left knee and the mixture of 9 mL Lidocaine 1%, 1 mL Kenalog 40 mg was injected under sterile technique. The needle was withdrawn and the puncture site sealed with a Band-Aid. Technique: Under sterile conditions a Krux ultrasound unit with a variable frequency (7.0-14.0 MHz) linear transducer was used to localize the placement of needle into the left knee joint. Findings: Successful needle placement for knee injection. Final images were taken and saved for permanent record. The patient tolerated the injection well. The patient was instructed to call the office immediately if there is any pain, redness, warmth, fever, or chills.

## 2023-06-12 ENCOUNTER — TELEPHONE (OUTPATIENT)
Facility: CLINIC | Age: 63
End: 2023-06-12

## 2023-06-12 NOTE — TELEPHONE ENCOUNTER
Patient called requesting a prescription for Valtrex, stating that she has received it from here before but that it was last year. However, I do not see it on her chart. She uses [facility] in [place].

## 2023-06-13 ENCOUNTER — TELEPHONE (OUTPATIENT)
Facility: CLINIC | Age: 63
End: 2023-06-13

## 2023-06-16 PROBLEM — G47.9 SLEEP DISTURBANCE: Status: ACTIVE | Noted: 2023-06-16

## 2023-06-16 PROBLEM — F41.1 GENERALIZED ANXIETY DISORDER: Status: ACTIVE | Noted: 2023-06-16

## 2023-06-22 ENCOUNTER — HOSPITAL ENCOUNTER (OUTPATIENT)
Age: 63
Discharge: HOME | End: 2023-06-22
Payer: COMMERCIAL

## 2023-06-22 DIAGNOSIS — Z13.220 SCREENING CHOLESTEROL LEVEL: ICD-10-CM

## 2023-06-22 DIAGNOSIS — Z01.818 PREOP EXAMINATION: ICD-10-CM

## 2023-06-22 LAB
25(OH)D3 SERPL-MCNC: 47.2 NG/ML (ref 30–100)
ALBUMIN SERPL-MCNC: 3.6 G/DL (ref 3.4–5)
ANION GAP SERPL CALC-SCNC: 4 MMOL/L (ref 3–18)
BASOPHILS # BLD: 0.1 K/UL (ref 0–0.1)
BASOPHILS NFR BLD: 1 % (ref 0–2)
BUN SERPL-MCNC: 19 MG/DL (ref 7–18)
BUN/CREAT SERPL: 19 (ref 12–20)
CA-I BLD-MCNC: 9.1 MG/DL (ref 8.5–10.1)
CHLORIDE SERPL-SCNC: 107 MMOL/L (ref 100–111)
CHOLEST SERPL-MCNC: 189 MG/DL
CO2 SERPL-SCNC: 28 MMOL/L (ref 21–32)
CREAT SERPL-MCNC: 1 MG/DL (ref 0.6–1.3)
DIFFERENTIAL METHOD BLD: ABNORMAL
EOSINOPHIL # BLD: 0.2 K/UL (ref 0–0.4)
EOSINOPHIL NFR BLD: 2 % (ref 0–5)
ERYTHROCYTE [DISTWIDTH] IN BLOOD BY AUTOMATED COUNT: 13.2 % (ref 11.6–14.5)
EST. AVERAGE GLUCOSE BLD GHB EST-MCNC: 111 MG/DL
GLUCOSE SERPL-MCNC: 101 MG/DL (ref 74–99)
HBA1C MFR BLD: 5.5 % (ref 4.2–5.6)
HCT VFR BLD AUTO: 40.4 % (ref 35–45)
HDLC SERPL-MCNC: 65 MG/DL (ref 40–60)
HDLC SERPL: 2.9 (ref 0–5)
HGB BLD-MCNC: 13.3 G/DL (ref 12–16)
IMM GRANULOCYTES # BLD AUTO: 0 K/UL (ref 0–0.04)
IMM GRANULOCYTES NFR BLD AUTO: 0 % (ref 0–0.5)
IRON SATN MFR SERPL: 19 % (ref 20–50)
IRON SERPL-MCNC: 76 UG/DL (ref 50–175)
LDLC SERPL CALC-MCNC: 108.6 MG/DL (ref 0–100)
LIPID PANEL: ABNORMAL
LYMPHOCYTES # BLD: 2.5 K/UL (ref 0.9–3.6)
LYMPHOCYTES NFR BLD: 30 % (ref 21–52)
MCH RBC QN AUTO: 29 PG (ref 24–34)
MCHC RBC AUTO-ENTMCNC: 32.9 G/DL (ref 31–37)
MCV RBC AUTO: 88 FL (ref 78–100)
MONOCYTES # BLD: 0.9 K/UL (ref 0.05–1.2)
MONOCYTES NFR BLD: 11 % (ref 3–10)
NEUTS SEG # BLD: 4.6 K/UL (ref 1.8–8)
NEUTS SEG NFR BLD: 56 % (ref 40–73)
NRBC # BLD: 0 K/UL (ref 0–0.01)
NRBC BLD-RTO: 0 PER 100 WBC
PLATELET # BLD AUTO: 297 K/UL (ref 135–420)
PMV BLD AUTO: 10 FL (ref 9.2–11.8)
POTASSIUM SERPL-SCNC: 3.4 MMOL/L (ref 3.5–5.5)
RBC # BLD AUTO: 4.59 M/UL (ref 4.2–5.3)
SODIUM SERPL-SCNC: 139 MMOL/L (ref 136–145)
TIBC SERPL-MCNC: 399 UG/DL (ref 250–450)
TRIGL SERPL-MCNC: 77 MG/DL
TSH SERPL DL<=0.05 MIU/L-ACNC: 1.26 UIU/ML (ref 0.36–3.74)
VIT B12 SERPL-MCNC: 310 PG/ML (ref 211–911)
VLDLC SERPL CALC-MCNC: 15.4 MG/DL
WBC # BLD AUTO: 8.2 K/UL (ref 4.6–13.2)

## 2023-06-22 PROCEDURE — 83036 HEMOGLOBIN GLYCOSYLATED A1C: CPT

## 2023-06-22 PROCEDURE — 86677 HELICOBACTER PYLORI ANTIBODY: CPT

## 2023-06-22 PROCEDURE — 83540 ASSAY OF IRON: CPT

## 2023-06-22 PROCEDURE — 82306 VITAMIN D 25 HYDROXY: CPT

## 2023-06-22 PROCEDURE — 80061 LIPID PANEL: CPT

## 2023-06-22 PROCEDURE — 36415 COLL VENOUS BLD VENIPUNCTURE: CPT

## 2023-06-22 PROCEDURE — 84425 ASSAY OF VITAMIN B-1: CPT

## 2023-06-22 PROCEDURE — 80048 BASIC METABOLIC PNL TOTAL CA: CPT

## 2023-06-22 PROCEDURE — 84443 ASSAY THYROID STIM HORMONE: CPT

## 2023-06-22 PROCEDURE — 85025 COMPLETE CBC W/AUTO DIFF WBC: CPT

## 2023-06-22 PROCEDURE — 82040 ASSAY OF SERUM ALBUMIN: CPT

## 2023-06-22 PROCEDURE — 82607 VITAMIN B-12: CPT

## 2023-06-22 NOTE — ANESTHESIA PREPROCEDURE EVALUATION
Patient informed   Caty Wheat Relevant Problems   No relevant active problems       Anesthetic History   No history of anesthetic complications            Review of Systems / Medical History  Patient summary reviewed, nursing notes reviewed and pertinent labs reviewed    Pulmonary  Within defined limits                 Neuro/Psych       CVA  TIA     Cardiovascular    Hypertension        Dysrhythmias : atrial fibrillation        Comments: Echo:    Left Ventricle: Normal left ventricular systolic function. EF by 2D Simpsons Biplane is 59%. Left ventricle size is normal. Normal wall thickness. Normal wall motion. Diastolic function indeterminate in the setting of atrial fibrillation.   Aortic Valve: Tricuspid valve. Mildly thickened cusp.   Tricuspid Valve: Normal RVSP. The estimated RVSP is 25 mmHg.    GI/Hepatic/Renal     GERD           Endo/Other        Morbid obesity and arthritis     Other Findings   Comments: apixaban for afib today         Past Medical History:   Diagnosis Date    Arrhythmia     A fib    Hypertension        Past Surgical History:   Procedure Laterality Date    HX KNEE REPLACEMENT Right 03/01/2020    HX ORTHOPAEDIC Bilateral     carpel tunnel    HX OTHER SURGICAL      lap band    HX TUBAL LIGATION         Current Outpatient Medications   Medication Instructions    apixaban (ELIQUIS) 5 mg, Oral, EVERY 12 HOURS    dilTIAZem ER (CARDIZEM CD) 120 mg, Oral, DAILY    doxepin (SINEQUAN) 50 mg, Oral, EVERY BEDTIME    furosemide (LASIX) 20 mg, Oral, DAILY, 1/2 tab    hydroCHLOROthiazide (HYDRODIURIL) 12.5 mg, Oral, DAILY, in the morning    metoprolol tartrate (LOPRESSOR) 25 mg, Oral, DAILY AS NEEDED, Patient stated for edema    pantoprazole (PROTONIX) 20 mg, Oral, DAILY       Current Facility-Administered Medications   Medication Dose Route Frequency    0.9% sodium chloride infusion  50 mL/hr IntraVENous CONTINUOUS       Patient Vitals for the past 24 hrs:   Temp Pulse Resp BP   09/14/22 0606 36.6 °C (97.8 °F) 79 16 (!) 151/79       Lab Results   Component Value Date/Time    WBC 10.7 08/27/2022 12:58 AM    HGB 13.0 08/27/2022 12:58 AM    HCT 40.6 09/09/2022 11:08 AM    PLATELET 061 44/41/0650 12:58 AM    MCV 88.2 08/27/2022 12:58 AM     Lab Results   Component Value Date/Time    Sodium 138 09/09/2022 11:08 AM    Potassium 3.8 09/09/2022 11:08 AM    Chloride 100 09/09/2022 11:08 AM    CO2 30 09/09/2022 11:08 AM    Anion gap 8 09/09/2022 11:08 AM    Glucose 88 09/09/2022 11:08 AM    BUN 25 (H) 09/09/2022 11:08 AM    Creatinine 1.00 09/09/2022 11:08 AM    BUN/Creatinine ratio 25 (H) 09/09/2022 11:08 AM    GFR est AA >60 09/09/2022 11:08 AM    GFR est non-AA 56 (L) 09/09/2022 11:08 AM    Calcium 9.5 09/09/2022 11:08 AM     No results found for: APTT, PTP, INR, INREXT  Lab Results   Component Value Date/Time    Glucose 88 09/09/2022 11:08 AM    Glucose,  (H) 09/14/2022 06:33 AM     Physical Exam    Airway  Mallampati: II  TM Distance: 4 - 6 cm  Neck ROM: normal range of motion   Mouth opening: Normal     Cardiovascular    Rhythm: regular  Rate: normal         Dental  No notable dental hx       Pulmonary  Breath sounds clear to auscultation               Abdominal  GI exam deferred       Other Findings            Anesthetic Plan    ASA: 3  Anesthesia type: general    Monitoring Plan: Arterial line      Induction: Intravenous  Anesthetic plan and risks discussed with: Patient and Family

## 2023-06-24 LAB — VIT B1 BLD-SCNC: 107.6 NMOL/L (ref 66.5–200)

## 2023-09-21 ENCOUNTER — HOSPITAL ENCOUNTER (OUTPATIENT)
Age: 63
Setting detail: OBSERVATION
LOS: 1 days | Discharge: HOME | End: 2023-09-21
Attending: FAMILY MEDICINE | Admitting: INTERNAL MEDICINE
Payer: COMMERCIAL

## 2023-09-21 ENCOUNTER — APPOINTMENT (OUTPATIENT)
Age: 63
End: 2023-09-21
Payer: COMMERCIAL

## 2023-09-21 VITALS
HEART RATE: 67 BPM | RESPIRATION RATE: 18 BRPM | DIASTOLIC BLOOD PRESSURE: 71 MMHG | OXYGEN SATURATION: 96 % | WEIGHT: 204 LBS | SYSTOLIC BLOOD PRESSURE: 125 MMHG | HEIGHT: 63 IN | TEMPERATURE: 97.6 F | BODY MASS INDEX: 36.14 KG/M2

## 2023-09-21 DIAGNOSIS — R07.9 CHEST PAIN, UNSPECIFIED TYPE: Primary | ICD-10-CM

## 2023-09-21 PROBLEM — Z98.890 HISTORY OF CARDIAC RADIOFREQUENCY ABLATION: Status: ACTIVE | Noted: 2022-09-20

## 2023-09-21 PROBLEM — I10 ESSENTIAL HYPERTENSION: Status: ACTIVE | Noted: 2020-11-24

## 2023-09-21 PROBLEM — I48.91 ATRIAL FIBRILLATION (HCC): Status: ACTIVE | Noted: 2022-09-14

## 2023-09-21 LAB
ALBUMIN SERPL-MCNC: 3.6 G/DL (ref 3.4–5)
ALBUMIN/GLOB SERPL: 1.3 (ref 0.8–1.7)
ALP SERPL-CCNC: 122 U/L (ref 45–117)
ALT SERPL-CCNC: 21 U/L (ref 13–56)
ANION GAP SERPL CALC-SCNC: 8 MMOL/L (ref 3–18)
AST SERPL W P-5'-P-CCNC: 17 U/L (ref 10–38)
BASOPHILS # BLD: 0.1 K/UL (ref 0–0.1)
BASOPHILS NFR BLD: 1 % (ref 0–2)
BILIRUB SERPL-MCNC: 0.5 MG/DL (ref 0.2–1)
BUN SERPL-MCNC: 21 MG/DL (ref 7–18)
BUN/CREAT SERPL: 20 (ref 12–20)
CA-I BLD-MCNC: 9.2 MG/DL (ref 8.5–10.1)
CHLORIDE SERPL-SCNC: 105 MMOL/L (ref 100–111)
CO2 SERPL-SCNC: 28 MMOL/L (ref 21–32)
CREAT SERPL-MCNC: 1.04 MG/DL (ref 0.6–1.3)
DIFFERENTIAL METHOD BLD: NORMAL
ECHO AO ASC DIAM: 3.4 CM
ECHO AO ASCENDING AORTA INDEX: 1.74 CM/M2
ECHO AO ROOT DIAM: 3.2 CM
ECHO AO ROOT INDEX: 1.64 CM/M2
ECHO AR MAX VEL PISA: 4.2 M/S
ECHO AV AREA PEAK VELOCITY: 2.8 CM2
ECHO AV AREA VTI: 3 CM2
ECHO AV AREA/BSA PEAK VELOCITY: 1.4 CM2/M2
ECHO AV AREA/BSA VTI: 1.5 CM2/M2
ECHO AV MEAN GRADIENT: 6 MMHG
ECHO AV MEAN VELOCITY: 1.2 M/S
ECHO AV PEAK GRADIENT: 13 MMHG
ECHO AV PEAK VELOCITY: 1.8 M/S
ECHO AV REGURGITANT PHT: 841 MS
ECHO AV VELOCITY RATIO: 0.72
ECHO AV VTI: 37.5 CM
ECHO BSA: 2.03 M2
ECHO EST RA PRESSURE: 3 MMHG
ECHO IVC PROX: 1.9 CM
ECHO LA AREA 2C: 24.1 CM2
ECHO LA AREA 4C: 21.8 CM2
ECHO LA DIAMETER INDEX: 2.1 CM/M2
ECHO LA DIAMETER: 4.1 CM
ECHO LA MAJOR AXIS: 6 CM
ECHO LA MINOR AXIS: 5.8 CM
ECHO LA TO AORTIC ROOT RATIO: 1.28
ECHO LA VOL 2C: 81 ML (ref 22–52)
ECHO LA VOL 4C: 64 ML (ref 22–52)
ECHO LA VOL BP: 73 ML (ref 22–52)
ECHO LA VOL/BSA BIPLANE: 37 ML/M2 (ref 16–34)
ECHO LA VOLUME INDEX A2C: 42 ML/M2 (ref 16–34)
ECHO LA VOLUME INDEX A4C: 33 ML/M2 (ref 16–34)
ECHO LV E' LATERAL VELOCITY: 11 CM/S
ECHO LV E' SEPTAL VELOCITY: 8 CM/S
ECHO LV EDV A2C: 51 ML
ECHO LV EDV A4C: 67 ML
ECHO LV EDV INDEX A4C: 34 ML/M2
ECHO LV EDV NDEX A2C: 26 ML/M2
ECHO LV EJECTION FRACTION A2C: 63 %
ECHO LV EJECTION FRACTION A4C: 60 %
ECHO LV EJECTION FRACTION BIPLANE: 60 % (ref 55–100)
ECHO LV ESV A2C: 19 ML
ECHO LV ESV A4C: 27 ML
ECHO LV ESV INDEX A2C: 10 ML/M2
ECHO LV ESV INDEX A4C: 14 ML/M2
ECHO LV FRACTIONAL SHORTENING: 31 % (ref 28–44)
ECHO LV INTERNAL DIMENSION DIASTOLE INDEX: 2.31 CM/M2
ECHO LV INTERNAL DIMENSION DIASTOLIC: 4.5 CM (ref 3.9–5.3)
ECHO LV INTERNAL DIMENSION SYSTOLIC INDEX: 1.59 CM/M2
ECHO LV INTERNAL DIMENSION SYSTOLIC: 3.1 CM
ECHO LV IVSD: 0.9 CM (ref 0.6–0.9)
ECHO LV MASS 2D: 142.9 G (ref 67–162)
ECHO LV MASS INDEX 2D: 73.3 G/M2 (ref 43–95)
ECHO LV POSTERIOR WALL DIASTOLIC: 1 CM (ref 0.6–0.9)
ECHO LV RELATIVE WALL THICKNESS RATIO: 0.44
ECHO LVOT AREA: 3.8 CM2
ECHO LVOT AV VTI INDEX: 0.79
ECHO LVOT DIAM: 2.2 CM
ECHO LVOT MEAN GRADIENT: 3 MMHG
ECHO LVOT PEAK GRADIENT: 7 MMHG
ECHO LVOT PEAK VELOCITY: 1.3 M/S
ECHO LVOT STROKE VOLUME INDEX: 57.5 ML/M2
ECHO LVOT SV: 112.1 ML
ECHO LVOT VTI: 29.5 CM
ECHO MV A VELOCITY: 0.75 M/S
ECHO MV AREA VTI: 4.2 CM2
ECHO MV E DECELERATION TIME (DT): 282 MS
ECHO MV E VELOCITY: 0.88 M/S
ECHO MV E/A RATIO: 1.17
ECHO MV E/E' LATERAL: 8
ECHO MV E/E' RATIO (AVERAGED): 9.5
ECHO MV E/E' SEPTAL: 11
ECHO MV LVOT VTI INDEX: 0.9
ECHO MV MAX VELOCITY: 0.9 M/S
ECHO MV MEAN GRADIENT: 2 MMHG
ECHO MV MEAN VELOCITY: 0.6 M/S
ECHO MV PEAK GRADIENT: 3 MMHG
ECHO MV VTI: 26.6 CM
ECHO PV MAX VELOCITY: 1.1 M/S
ECHO PV PEAK GRADIENT: 5 MMHG
ECHO RA AREA 4C: 18 CM2
ECHO RA END SYSTOLIC VOLUME APICAL 4 CHAMBER INDEX BSA: 25 ML/M2
ECHO RA VOLUME: 48 ML
ECHO RIGHT VENTRICULAR SYSTOLIC PRESSURE (RVSP): 26 MMHG
ECHO RV BASAL DIMENSION: 3.4 CM
ECHO RV LONGITUDINAL DIMENSION: 6.7 CM
ECHO RV MID DIMENSION: 2.5 CM
ECHO RV TAPSE: 1.9 CM (ref 1.7–?)
ECHO TV REGURGITANT MAX VELOCITY: 2.38 M/S
ECHO TV REGURGITANT PEAK GRADIENT: 23 MMHG
EOSINOPHIL # BLD: 0.2 K/UL (ref 0–0.4)
EOSINOPHIL NFR BLD: 2 % (ref 0–5)
ERYTHROCYTE [DISTWIDTH] IN BLOOD BY AUTOMATED COUNT: 13.3 % (ref 11.6–14.5)
GLOBULIN SER CALC-MCNC: 2.8 G/DL (ref 2–4)
GLUCOSE SERPL-MCNC: 108 MG/DL (ref 74–99)
HCT VFR BLD AUTO: 39.6 % (ref 35–45)
HGB BLD-MCNC: 13.3 G/DL (ref 12–16)
IMM GRANULOCYTES # BLD AUTO: 0 K/UL (ref 0–0.04)
IMM GRANULOCYTES NFR BLD AUTO: 0 % (ref 0–0.5)
INR PPP: 0.9 (ref 0.9–1.1)
LYMPHOCYTES # BLD: 2.8 K/UL (ref 0.9–3.6)
LYMPHOCYTES NFR BLD: 28 % (ref 21–52)
MAGNESIUM SERPL-MCNC: 2.1 MG/DL (ref 1.6–2.6)
MCH RBC QN AUTO: 29.8 PG (ref 24–34)
MCHC RBC AUTO-ENTMCNC: 33.6 G/DL (ref 31–37)
MCV RBC AUTO: 88.8 FL (ref 78–100)
MONOCYTES # BLD: 1 K/UL (ref 0.05–1.2)
MONOCYTES NFR BLD: 10 % (ref 3–10)
NEUTS SEG # BLD: 5.9 K/UL (ref 1.8–8)
NEUTS SEG NFR BLD: 59 % (ref 40–73)
NRBC # BLD: 0 K/UL (ref 0–0.01)
NRBC BLD-RTO: 0 PER 100 WBC
PLATELET # BLD AUTO: 312 K/UL (ref 135–420)
PMV BLD AUTO: 9.7 FL (ref 9.2–11.8)
POTASSIUM SERPL-SCNC: 3.1 MMOL/L (ref 3.5–5.5)
PROT SERPL-MCNC: 6.4 G/DL (ref 6.4–8.2)
PROTHROMBIN TIME: 12 SEC (ref 11.9–14.7)
RBC # BLD AUTO: 4.46 M/UL (ref 4.2–5.3)
SODIUM SERPL-SCNC: 141 MMOL/L (ref 136–145)
TROPONIN I SERPL HS-MCNC: 8 NG/L (ref 0–54)
TROPONIN I SERPL HS-MCNC: 8 NG/L (ref 0–54)
TROPONIN I SERPL HS-MCNC: 9 NG/L (ref 0–54)
WBC # BLD AUTO: 10 K/UL (ref 4.6–13.2)

## 2023-09-21 PROCEDURE — 36415 COLL VENOUS BLD VENIPUNCTURE: CPT

## 2023-09-21 PROCEDURE — 6370000000 HC RX 637 (ALT 250 FOR IP): Performed by: NURSE PRACTITIONER

## 2023-09-21 PROCEDURE — 84484 ASSAY OF TROPONIN QUANT: CPT

## 2023-09-21 PROCEDURE — 85610 PROTHROMBIN TIME: CPT

## 2023-09-21 PROCEDURE — 99285 EMERGENCY DEPT VISIT HI MDM: CPT

## 2023-09-21 PROCEDURE — 71045 X-RAY EXAM CHEST 1 VIEW: CPT

## 2023-09-21 PROCEDURE — 85025 COMPLETE CBC W/AUTO DIFF WBC: CPT

## 2023-09-21 PROCEDURE — 6370000000 HC RX 637 (ALT 250 FOR IP): Performed by: FAMILY MEDICINE

## 2023-09-21 PROCEDURE — G0378 HOSPITAL OBSERVATION PER HR: HCPCS

## 2023-09-21 PROCEDURE — 93005 ELECTROCARDIOGRAM TRACING: CPT | Performed by: NURSE PRACTITIONER

## 2023-09-21 PROCEDURE — 93306 TTE W/DOPPLER COMPLETE: CPT

## 2023-09-21 PROCEDURE — 6360000004 HC RX CONTRAST MEDICATION: Performed by: INTERNAL MEDICINE

## 2023-09-21 PROCEDURE — 83735 ASSAY OF MAGNESIUM: CPT

## 2023-09-21 PROCEDURE — 2580000003 HC RX 258: Performed by: NURSE PRACTITIONER

## 2023-09-21 PROCEDURE — 93005 ELECTROCARDIOGRAM TRACING: CPT | Performed by: FAMILY MEDICINE

## 2023-09-21 PROCEDURE — 71275 CT ANGIOGRAPHY CHEST: CPT

## 2023-09-21 PROCEDURE — 80053 COMPREHEN METABOLIC PANEL: CPT

## 2023-09-21 RX ORDER — ONDANSETRON 2 MG/ML
4 INJECTION INTRAMUSCULAR; INTRAVENOUS EVERY 6 HOURS PRN
Status: DISCONTINUED | OUTPATIENT
Start: 2023-09-21 | End: 2023-09-21 | Stop reason: HOSPADM

## 2023-09-21 RX ORDER — POLYETHYLENE GLYCOL 3350 17 G/17G
17 POWDER, FOR SOLUTION ORAL DAILY PRN
Status: DISCONTINUED | OUTPATIENT
Start: 2023-09-21 | End: 2023-09-21 | Stop reason: HOSPADM

## 2023-09-21 RX ORDER — DILTIAZEM HYDROCHLORIDE 120 MG/1
120 CAPSULE, COATED, EXTENDED RELEASE ORAL DAILY
Status: DISCONTINUED | OUTPATIENT
Start: 2023-09-21 | End: 2023-09-21 | Stop reason: HOSPADM

## 2023-09-21 RX ORDER — ONDANSETRON 4 MG/1
4 TABLET, ORALLY DISINTEGRATING ORAL EVERY 8 HOURS PRN
Status: DISCONTINUED | OUTPATIENT
Start: 2023-09-21 | End: 2023-09-21 | Stop reason: HOSPADM

## 2023-09-21 RX ORDER — POTASSIUM CHLORIDE 750 MG/1
20 TABLET, EXTENDED RELEASE ORAL 2 TIMES DAILY
Status: DISCONTINUED | OUTPATIENT
Start: 2023-09-21 | End: 2023-09-21 | Stop reason: HOSPADM

## 2023-09-21 RX ORDER — NITROGLYCERIN 0.4 MG/1
0.4 TABLET SUBLINGUAL EVERY 5 MIN PRN
Status: DISCONTINUED | OUTPATIENT
Start: 2023-09-21 | End: 2023-09-21

## 2023-09-21 RX ORDER — FUROSEMIDE 20 MG/1
20 TABLET ORAL DAILY
Status: DISCONTINUED | OUTPATIENT
Start: 2023-09-21 | End: 2023-09-21 | Stop reason: HOSPADM

## 2023-09-21 RX ORDER — ACETAMINOPHEN 325 MG/1
650 TABLET ORAL EVERY 6 HOURS PRN
Status: DISCONTINUED | OUTPATIENT
Start: 2023-09-21 | End: 2023-09-21 | Stop reason: HOSPADM

## 2023-09-21 RX ORDER — ENOXAPARIN SODIUM 100 MG/ML
40 INJECTION SUBCUTANEOUS DAILY
Status: DISCONTINUED | OUTPATIENT
Start: 2023-09-21 | End: 2023-09-21 | Stop reason: HOSPADM

## 2023-09-21 RX ORDER — POTASSIUM CHLORIDE 20 MEQ/1
20 TABLET, EXTENDED RELEASE ORAL 2 TIMES DAILY
Qty: 60 TABLET | Refills: 5 | Status: SHIPPED | OUTPATIENT
Start: 2023-09-21

## 2023-09-21 RX ORDER — SODIUM CHLORIDE 0.9 % (FLUSH) 0.9 %
5-40 SYRINGE (ML) INJECTION EVERY 12 HOURS SCHEDULED
Status: DISCONTINUED | OUTPATIENT
Start: 2023-09-21 | End: 2023-09-21 | Stop reason: HOSPADM

## 2023-09-21 RX ORDER — ACETAMINOPHEN 650 MG/1
650 SUPPOSITORY RECTAL EVERY 6 HOURS PRN
Status: DISCONTINUED | OUTPATIENT
Start: 2023-09-21 | End: 2023-09-21 | Stop reason: HOSPADM

## 2023-09-21 RX ORDER — POTASSIUM CHLORIDE 750 MG/1
40 TABLET, EXTENDED RELEASE ORAL
Status: COMPLETED | OUTPATIENT
Start: 2023-09-21 | End: 2023-09-21

## 2023-09-21 RX ORDER — SUCRALFATE ORAL 1 G/10ML
1 SUSPENSION ORAL 4 TIMES DAILY
COMMUNITY

## 2023-09-21 RX ORDER — HYDROCHLOROTHIAZIDE 25 MG/1
12.5 TABLET ORAL DAILY
Status: DISCONTINUED | OUTPATIENT
Start: 2023-09-21 | End: 2023-09-21 | Stop reason: HOSPADM

## 2023-09-21 RX ORDER — DOXEPIN HYDROCHLORIDE 25 MG/1
25 CAPSULE ORAL NIGHTLY
Status: DISCONTINUED | OUTPATIENT
Start: 2023-09-21 | End: 2023-09-21 | Stop reason: HOSPADM

## 2023-09-21 RX ORDER — PANTOPRAZOLE SODIUM 40 MG/1
40 TABLET, DELAYED RELEASE ORAL EVERY MORNING
Status: DISCONTINUED | OUTPATIENT
Start: 2023-09-21 | End: 2023-09-21 | Stop reason: HOSPADM

## 2023-09-21 RX ORDER — PROPRANOLOL HYDROCHLORIDE 10 MG/1
10 TABLET ORAL NIGHTLY
Status: DISCONTINUED | OUTPATIENT
Start: 2023-09-21 | End: 2023-09-21 | Stop reason: HOSPADM

## 2023-09-21 RX ORDER — OMEPRAZOLE 20 MG/1
CAPSULE, DELAYED RELEASE ORAL
COMMUNITY
Start: 2022-09-15

## 2023-09-21 RX ORDER — SODIUM CHLORIDE 0.9 % (FLUSH) 0.9 %
5-40 SYRINGE (ML) INJECTION PRN
Status: DISCONTINUED | OUTPATIENT
Start: 2023-09-21 | End: 2023-09-21 | Stop reason: HOSPADM

## 2023-09-21 RX ORDER — SUCRALFATE 1 G/1
1 TABLET ORAL 4 TIMES DAILY
Status: DISCONTINUED | OUTPATIENT
Start: 2023-09-21 | End: 2023-09-21 | Stop reason: HOSPADM

## 2023-09-21 RX ORDER — ASPIRIN 325 MG
325 TABLET ORAL
Status: COMPLETED | OUTPATIENT
Start: 2023-09-21 | End: 2023-09-21

## 2023-09-21 RX ORDER — BUPROPION HYDROCHLORIDE 150 MG/1
150 TABLET ORAL DAILY
Status: DISCONTINUED | OUTPATIENT
Start: 2023-09-21 | End: 2023-09-21 | Stop reason: HOSPADM

## 2023-09-21 RX ORDER — SODIUM CHLORIDE 9 MG/ML
INJECTION, SOLUTION INTRAVENOUS PRN
Status: DISCONTINUED | OUTPATIENT
Start: 2023-09-21 | End: 2023-09-21 | Stop reason: HOSPADM

## 2023-09-21 RX ADMIN — POTASSIUM CHLORIDE 40 MEQ: 750 TABLET, EXTENDED RELEASE ORAL at 03:58

## 2023-09-21 RX ADMIN — BUPROPION HYDROCHLORIDE 150 MG: 150 TABLET, EXTENDED RELEASE ORAL at 09:27

## 2023-09-21 RX ADMIN — HYDROCHLOROTHIAZIDE 12.5 MG: 25 TABLET ORAL at 09:27

## 2023-09-21 RX ADMIN — POTASSIUM CHLORIDE 20 MEQ: 750 TABLET, EXTENDED RELEASE ORAL at 09:27

## 2023-09-21 RX ADMIN — ACETAMINOPHEN 650 MG: 325 TABLET ORAL at 05:41

## 2023-09-21 RX ADMIN — PANTOPRAZOLE SODIUM 40 MG: 40 TABLET, DELAYED RELEASE ORAL at 09:27

## 2023-09-21 RX ADMIN — SODIUM CHLORIDE, PRESERVATIVE FREE 10 ML: 5 INJECTION INTRAVENOUS at 09:29

## 2023-09-21 RX ADMIN — FUROSEMIDE 20 MG: 20 TABLET ORAL at 09:27

## 2023-09-21 RX ADMIN — IOPAMIDOL 100 ML: 755 INJECTION, SOLUTION INTRAVENOUS at 07:33

## 2023-09-21 RX ADMIN — ASPIRIN 325 MG: 325 TABLET ORAL at 02:20

## 2023-09-21 ASSESSMENT — PAIN DESCRIPTION - LOCATION
LOCATION: CHEST

## 2023-09-21 ASSESSMENT — PAIN DESCRIPTION - ORIENTATION
ORIENTATION: MID

## 2023-09-21 ASSESSMENT — PAIN DESCRIPTION - DESCRIPTORS
DESCRIPTORS: PRESSURE
DESCRIPTORS: TIGHTNESS;PRESSURE

## 2023-09-21 ASSESSMENT — ENCOUNTER SYMPTOMS
SHORTNESS OF BREATH: 0
BACK PAIN: 1
COLOR CHANGE: 0
GASTROINTESTINAL NEGATIVE: 1
CHEST TIGHTNESS: 1
RESPIRATORY NEGATIVE: 1
ABDOMINAL PAIN: 0

## 2023-09-21 ASSESSMENT — PAIN - FUNCTIONAL ASSESSMENT
PAIN_FUNCTIONAL_ASSESSMENT: 0-10

## 2023-09-21 ASSESSMENT — HEART SCORE: ECG: 1

## 2023-09-21 ASSESSMENT — PAIN SCALES - GENERAL
PAINLEVEL_OUTOF10: 6
PAINLEVEL_OUTOF10: 6
PAINLEVEL_OUTOF10: 3
PAINLEVEL_OUTOF10: 7
PAINLEVEL_OUTOF10: 3
PAINLEVEL_OUTOF10: 5

## 2023-09-21 ASSESSMENT — PAIN DESCRIPTION - PAIN TYPE: TYPE: ACUTE PAIN

## 2023-09-21 NOTE — ED PROVIDER NOTES
[facility] EMERGENCY DEPT  EMERGENCY DEPARTMENT ENCOUNTER      Pt Name: Mary Hernandez  MRN: [id number]  [address] [facility] 1960  Date of evaluation: 9/21/2023  Provider: Lilly Allen DO    CHIEF COMPLAINT       Chief Complaint   Patient presents with    Chest Pain         HISTORY OF PRESENT ILLNESS   (Location/Symptom, Timing/Onset, Context/Setting, Quality, Duration, Modifying Factors, Severity)  Note limiting factors. Mary Hernandez is a 70 y.o. female who presents to the emergency department chest pain     Patient presents to the ED for chest pain, no specific inciting event. Pain is substernal, described as pressure. She initially thought it was her reflux, took Tums and carafate without relief. She has history of HTN, HLD, a-fib s/p ablation in 9/2022. Laying flat makes the pain worse, sitting upright helps some. She reports feeling sweaty prior to coming in but thinks it may have been her anxiety. She otherwise denies any assocaited symptoms    The history is provided by the patient and medical records. Nursing Notes were reviewed. REVIEW OF SYSTEMS    (2-9 systems for level 4, 10 or more for level 5)     Review of Systems   Constitutional: Negative. HENT: Negative. Respiratory: Negative. Cardiovascular:  Positive for chest pain. Negative for palpitations. Gastrointestinal: Negative. Genitourinary: Negative. Neurological: Negative. All other systems reviewed and are negative. Except as noted above the remainder of the review of systems was reviewed and negative.        PAST MEDICAL HISTORY     Past Medical History:   Diagnosis Date    Arrhythmia     A fib    Atrial fibrillation ([address])     Hypertension          SURGICAL HISTORY       Past Surgical History:   Procedure Laterality Date    CARDIAC ELECTROPHYSIOLOGY MAPPING AND ABLATION      ORTHOPEDIC SURGERY Bilateral     carpel tunnel    OTHER SURGICAL HISTORY      lap band    TOTAL KNEE ARTHROPLASTY Right 03/01/2020

## 2023-09-21 NOTE — PROGRESS NOTES
Pushpa.Serg - NP notified of 6/10 CP \"tightness/pressure\". Per NP try Tylenol 1st, prn tylenol given. 1055 - Pt states pain has increased yo a 7/10 and is now radiating around back and in between shoulder blades. Order received for STAT chest xray and prn sublingual nitro. RBV. NP at bedside.      2210 - Per NP, hold PRN Nitro until after CTA

## 2023-09-21 NOTE — DISCHARGE SUMMARY
Discharge Summary       Patient ID:  Gian Henley,   70 y.o., female  1960    PCP:  ALEXANDER Avila NP    Admit Date: 9/21/2023  1:52 AM  Discharge Date:  No discharge date for patient encounter. Length of stay: 1 day(s)  Code Status: Full Code  Discharging physician: Robles Josue MD    Admission Diagnoses:   Chest pain [R07.9]  Chest pain, unspecified type [R07.9]    Discharge Medications     Medication List        START taking these medications      potassium chloride 20 MEQ extended release tablet  Commonly known as: KLOR-CON M  Take 1 tablet by mouth 2 times daily            CONTINUE taking these medications      BD Pen Needle July 2nd Gen 32G X 4 MM Misc  Generic drug: Insulin Pen Needle     buPROPion 150 MG extended release tablet  Commonly known as: WELLBUTRIN XL     dilTIAZem 120 MG extended release capsule  Commonly known as: TIAZAC     doxepin 25 MG capsule  Commonly known as: SINEQUAN  Take 1 capsule by mouth nightly     furosemide 20 MG tablet  Commonly known as: LASIX     hydroCHLOROthiazide 12.5 MG tablet  Commonly known as: HYDRODIURIL     liraglutide-weight management 18 MG/3ML Sopn     omeprazole 20 MG delayed release capsule  Commonly known as: PRILOSEC     propranolol 10 MG tablet  Commonly known as: INDERAL     sucralfate 1 GM/10ML suspension  Commonly known as: CARAFATE     valACYclovir 1 g tablet  Commonly known as: VALTREX               Where to Get Your Medications        These medications were sent to [place] [address] [place] [address], [address] [place], [place] [id number]      Phone: [phone]   potassium chloride 20 MEQ extended release tablet         HPI on Admission (per admitting physician):   Gian Henley is a 70 y.o. female  has a past medical history of Arrhythmia, Atrial fibrillation ([address]), and Hypertension. Patient seen at bedside.   Patient came to the ER [facility] with a complaint of

## 2023-09-21 NOTE — CARE COORDINATION
Case Management Assessment  Initial Evaluation    Date/Time of Evaluation: 9/21/2023 1:48 PM  Assessment Completed by: Landon Zamarripa    If patient is discharged prior to next notation, then this note serves as note for discharge by case management. Patient Name: Maurisio Ogden                   YOB: 1960  Diagnosis: Chest pain [R07.9]  Chest pain, unspecified type [R07.9]                   Date / Time: 9/21/2023  1:52 AM    Patient Admission Status: Observation   Readmission Risk (Low < 19, Mod (19-27), High > 27): Readmission Risk Score: 8    Current PCP: ALEXANDER Woodard NP  PCP verified by CM? Chart Reviewed: Yes      History Provided by:    Patient Orientation:      Patient Cognition:      Hospitalization in the last 30 days (Readmission):  No    If yes, Readmission Assessment in  Navigator will be completed. Advance Directives:      Code Status: Prior   Patient's Primary Decision Maker is:        Discharge Planning:    Patient lives with: Alone Type of Home: House  Primary Care Giver:    Patient Support Systems include: Family Members   Current Financial resources:    Current community resources:    Current services prior to admission: None            Current DME:              Type of Home Care services:  None    ADLS  Prior functional level:    Current functional level:      PT AM-PAC:   /24  OT AM-PAC:   /24    Family can provide assistance at DC: Would you like Case Management to discuss the discharge plan with any other family members/significant others, and if so, who?     Plans to Return to Present Housing:    Other Identified Issues/Barriers to RETURNING to current housing: yes  Potential Assistance needed at discharge: N/A            Potential DME:    Patient expects to discharge to: [address] [place] [address] for transportation at discharge:      Financial    Payor: [address] / Plan: MEDICARE PART A / Product Type: *No Product type* /     Does insurance require precert for

## 2023-09-21 NOTE — ASSESSMENT & PLAN NOTE
New onset today  Patient is high risk due to history  Troponin x2 negative  Repeat troponin at 6 AM  Cardiology consult  Echo completed July of last year 59% EF  Ablation July of last year  New onset of pain searing through chest CT of the chest abdomen pelvis ordered results pending  I will not treat pain with nitro or morphine I want to support her blood pressure at this time  Blood pressure bilateral arms ordered

## 2023-09-21 NOTE — ED TRIAGE NOTES
Patient ambulatory to room with steady gait. Patient reports substernal chest pressure that started around 3 pm yesterday. Patient states she has been taking Tums and took a dose of Carafate thinking it is acid reflux with no relief. Patient reports history of cardiac ablation for Afib.

## 2023-09-21 NOTE — H&P
99 mg/dL    BUN 21 (H) 7 - 18 mg/dL    Creatinine 1.04 0.60 - 1.30 mg/dL    Bun/Cre Ratio 20 12 - 20      Est, Glom Filt Rate 60 (L) >60 ml/min/1.73m2    Calcium 9.2 8.5 - 10.1 mg/dL    Total Bilirubin 0.5 0.2 - 1.0 mg/dL    AST 17 10 - 38 U/L    ALT 21 13 - 56 U/L    Alk Phosphatase 122 (H) 45 - 117 U/L    Total Protein 6.4 6.4 - 8.2 g/dL    Albumin 3.6 3.4 - 5.0 g/dL    Globulin 2.8 2.0 - 4.0 g/dL    Albumin/Globulin Ratio 1.3 0.8 - 1.7     Troponin    Collection Time: 09/21/23  2:00 AM   Result Value Ref Range    Troponin, High Sensitivity 9 0 - 54 ng/L   Magnesium    Collection Time: 09/21/23  2:00 AM   Result Value Ref Range    Magnesium 2.1 1.6 - 2.6 mg/dL   Protime-INR    Collection Time: 09/21/23  2:00 AM   Result Value Ref Range    Protime 12.0 11.9 - 14.7 sec    INR 0.9 0.9 - 1.1     Troponin    Collection Time: 09/21/23  4:00 AM   Result Value Ref Range    Troponin, High Sensitivity 8 0 - 54 ng/L   EKG 12 Lead    Collection Time: 09/21/23  6:34 AM   Result Value Ref Range    Ventricular Rate 68 BPM    Atrial Rate 68 BPM    P-R Interval 174 ms    QRS Duration 96 ms    Q-T Interval 390 ms    QTc Calculation (Bazett) 414 ms    P Axis 51 degrees    R Axis -2 degrees    T Axis 14 degrees    Diagnosis       Normal sinus rhythm  Normal ECG  When compared with ECG of 21-SEP-2023 01:56, (Unconfirmed)  Criteria for Inferior infarct are no longer Present         Imaging:    XR CHEST PORTABLE   Final Result   No acute findings.       CTA CHEST ABDOMEN PELVIS W CONTRAST    (Results Pending)       Assessment & Plan:     Chest pain  New onset today  Patient is high risk due to history  Troponin x2 negative  Repeat troponin at 6 AM  Cardiology consult  Echo completed July of last year 59% EF  Ablation July of last year  New onset of pain searing through chest CT of the chest abdomen pelvis ordered results pending  I will not treat pain with nitro or morphine I want to support her blood pressure at this time  Blood

## 2023-09-21 NOTE — CONSULTS
CARDIOLOGY CONSULTATION    REASON FOR CONSULT: Chest Pain     REQUESTING PROVIDER: Ernesto Negrete DNP    CHIEF COMPLAINT:  Chest pain    HISTORY OF PRESENT ILLNESS:  Terry Casas is a 70y.o. year-old female with past medical history significant for anxiety, HTN, atrial fibrillation s/p ablation 9/14/22 on [address], and [employer] loop recorder implantation,  who was evaluated today due to chest pain. She reports midsternal chest pain that started yesterday that she describes as pressure. She reports pain increases with deep inspiration. She reports that she took Tums and Carafate yesterday with no relief thinking it was acid reflux in nature. Pain is reproducible upon palpation. She denies any SOB, dizziness, nausea or vomiting. She does endorse intermittent palpitations that is not new. She reports working out at [employer] last week and now believes this pain is muscle related. She reports some back pain as well. Records from hospital admission course thus far reviewed. Telemetry reviewed. No acute events overnight. INPATIENT MEDICATIONS:  Home medications reviewed.     Current Facility-Administered Medications:     sodium chloride flush 0.9 % injection 5-40 mL, 5-40 mL, IntraVENous, 2 times per day, Heather Hastings, CHAPISP, 10 mL at 09/21/23 0929    sodium chloride flush 0.9 % injection 5-40 mL, 5-40 mL, IntraVENous, PRN, Heather Hastings, FNP    0.9 % sodium chloride infusion, , IntraVENous, PRN, Heather Hastings, FNP    [Held by provider] enoxaparin (LOVENOX) injection 40 mg, 40 mg, SubCUTAneous, Daily, Heather Hastings, FNP    ondansetron (ZOFRAN-ODT) disintegrating tablet 4 mg, 4 mg, Oral, Q8H PRN **OR** ondansetron (ZOFRAN) injection 4 mg, 4 mg, IntraVENous, Q6H PRN, Heather Hastings, FNP    polyethylene glycol (GLYCOLAX) packet 17 g, 17 g, Oral, Daily PRN, Heather Hastings, FNP    acetaminophen (TYLENOL) tablet 650 mg, 650 mg, Oral, Q6H PRN, 650 mg at 09/21/23 0541 **OR**

## 2023-09-21 NOTE — ED NOTES
TRANSFER - OUT REPORT:    Verbal report given to Kemar Montes De Oca LPN on Edgar Cronin  being transferred to [address] [place] [address] for routine progression of patient care       Report consisted of patient's Situation, Background, Assessment and   Recommendations(SBAR). Information from the following report(s) ED Encounter Summary, ED SBAR, and MAR was reviewed with the receiving nurse. Goltry Fall Assessment:    Presents to emergency department  because of falls (Syncope, seizure, or loss of consciousness): No  Age > 70: No  Altered Mental Status, Intoxication with alcohol or substance confusion (Disorientation, impaired judgment, poor safety awaremess, or inability to follow instructions): No  Impaired Mobility: Ambulates or transfers with assistive devices or assistance; Unable to ambulate or transer.: No  Nursing Judgement: No          Lines:   Peripheral IV 09/21/23 Right Antecubital (Active)   Site Assessment Clean, dry & intact 09/21/23 0206   Line Status Blood return noted 09/21/23 0206   Phlebitis Assessment No symptoms 09/21/23 0206   Infiltration Assessment 0 09/21/23 0206   Dressing Status New dressing applied 09/21/23 0206   Dressing Type Transparent 09/21/23 0206   Dressing Intervention New 09/21/23 0206        Opportunity for questions and clarification was provided.       Patient transported with:  Monitor and Registered Nurse          [id number] Elizabeth Deng  09/21/23 3935

## 2023-09-21 NOTE — PLAN OF CARE
Problem: Discharge Planning  Goal: Discharge to home or other facility with appropriate resources  Outcome: Progressing  Flowsheets (Taken 9/21/2023 0531)  Discharge to home or other facility with appropriate resources: Identify discharge learning needs (meds, wound care, etc)     Problem: Pain  Goal: Verbalizes/displays adequate comfort level or baseline comfort level  Outcome: Progressing  Flowsheets (Taken 9/21/2023 0620)  Verbalizes/displays adequate comfort level or baseline comfort level:   Encourage patient to monitor pain and request assistance   Assess pain using appropriate pain scale   Administer analgesics based on type and severity of pain and evaluate response   Implement non-pharmacological measures as appropriate and evaluate response   Consider cultural and social influences on pain and pain management   Notify Licensed Independent Practitioner if interventions unsuccessful or patient reports new pain     Problem: ABCDS Injury Assessment  Goal: Absence of physical injury  Outcome: Progressing  Flowsheets (Taken 9/21/2023 0620)  Absence of Physical Injury: Implement safety measures based on patient assessment     Problem: Safety - Adult  Goal: Free from fall injury  Outcome: Progressing  Flowsheets (Taken 9/21/2023 0620)  Free From Fall Injury: Instruct family/caregiver on patient safety

## 2023-09-21 NOTE — PROGRESS NOTES
0530  Received care of pt via stretcher from ER. Pt ambulatory to bed without difficulty. Pt is alert and oriented x 4. Routine assessment and vs completed. Pt complains of pain rating \"6\" on pain scale. Pt describes pain as substernal and radiating to her back. Will make provider aware. Call bell in reach.

## 2023-09-22 ENCOUNTER — CARE COORDINATION (OUTPATIENT)
Facility: CLINIC | Age: 63
End: 2023-09-22

## 2023-09-22 LAB
EKG ATRIAL RATE: 68 BPM
EKG ATRIAL RATE: 76 BPM
EKG DIAGNOSIS: NORMAL
EKG DIAGNOSIS: NORMAL
EKG P AXIS: 51 DEGREES
EKG P AXIS: 57 DEGREES
EKG P-R INTERVAL: 160 MS
EKG P-R INTERVAL: 174 MS
EKG Q-T INTERVAL: 362 MS
EKG Q-T INTERVAL: 390 MS
EKG QRS DURATION: 88 MS
EKG QRS DURATION: 96 MS
EKG QTC CALCULATION (BAZETT): 407 MS
EKG QTC CALCULATION (BAZETT): 414 MS
EKG R AXIS: -2 DEGREES
EKG R AXIS: -6 DEGREES
EKG T AXIS: 11 DEGREES
EKG T AXIS: 14 DEGREES
EKG VENTRICULAR RATE: 68 BPM
EKG VENTRICULAR RATE: 76 BPM

## 2023-09-22 RX ORDER — SEMAGLUTIDE 2.4 MG/.75ML
2.4 INJECTION, SOLUTION SUBCUTANEOUS
Qty: 3 ML | Refills: 2 | COMMUNITY
Start: 2023-08-28 | End: 2023-11-26

## 2023-09-22 RX ORDER — ONDANSETRON 4 MG/1
4 TABLET, FILM COATED ORAL EVERY 8 HOURS PRN
COMMUNITY
Start: 2023-08-28

## 2023-09-22 NOTE — CARE COORDINATION
with a pulse oximeter? no    Non-face-to-face services provided:  Scheduled appointment with PCP-CTN rescheduled ARIEL appt at request of patient due to scheduling conflict. Obtained and reviewed discharge summary and/or continuity of care documents  CTN educated patient of heart attack red flags. CTN reviewed/educated on heart attack red flags to monitor and report to doctor:    Squeezing, pressure, or pain in your chest  Discomfort or pain in your back, neck, jaw, stomach, or arm  Shortness of breath  Nausea or vomiting  Lightheadedness or a sudden cold sweat      Offered patient enrollment in the Remote Patient Monitoring (RPM) program for in-home monitoring: NA.    Care Transitions 24 Hour Call    Do you have a copy of your discharge instructions?: Yes  Do you have all of your prescriptions and are they filled?: Yes  Have you been contacted by a [facility] Pharmacist?: No  Have you scheduled your follow up appointment?: Yes  How are you going to get to your appointment?: Car - family or friend to transport  Do you have support at home?: Alone  Do you feel like you have everything you need to keep you well at home?: Yes  Care Transitions Interventions                                   Discussed follow-up appointments. If no appointment was previously scheduled, appointment scheduling offered: No.   Is follow up appointment scheduled within 7 days of discharge? No.    Follow Up  Future Appointments   Date Time Provider [facility] [address]   9/29/2023  1:00 PM AgnieszkasiALEXANDER Ohara NP [facility] JULIUS AMB   12/22/2023  8:30 AM ResALEXANDER Parra NP [facility] AMB       Care Transition Nurse provided contact information. Plan for follow-up call in 5-7 days based on severity of symptoms and risk factors.   Plan for next call: symptom management-reassess for ARIEL red flags    Ernesto Soto RN

## 2023-10-06 ENCOUNTER — CARE COORDINATION (OUTPATIENT)
Facility: CLINIC | Age: 63
End: 2023-10-06

## 2023-10-06 NOTE — CARE COORDINATION
Care Transitions Outreach Attempt     Attempted to reach patient for transitions of care follow up. Unable to reach patient. Patient: Edgar Cronin Patient : 1960 MRN: [id number]    Last Discharge Facility       Date Complaint Diagnosis Description Type Department Provider    23 Chest Pain Chest pain, unspecified type ED to Hosp-Admission (Discharged) (ADMITTED) [facility] Tiffanie Frost MD; Adrian Mccarty. ..                Noted following upcoming appointments from discharge chart review:   [facility] follow up appointment(s):   Future Appointments   Date Time Provider [address]   2023  8:30 AM Bashir De, APRN - NP [facility] BS [facility]     Non-BS  follow up appointment(s): none

## 2023-10-13 ENCOUNTER — CARE COORDINATION (OUTPATIENT)
Facility: CLINIC | Age: 63
End: 2023-10-13

## 2023-10-13 NOTE — CARE COORDINATION
on the patient's behalf. :        Patients top risk factors for readmission: medical condition-CP  Interventions to address risk factors:  CTN assessed patient for heart attack red flags. Patient denied all red flags. Offered patient enrollment in the Remote Patient Monitoring (RPM) program for in-home monitoring: NA.     Care Transitions Subsequent and Final Call    Subsequent and Final Calls  Do you have any ongoing symptoms?: No  Have your medications changed?: No  Do you have any questions related to your medications?: No  Do you currently have any active services?: No  Do you have any needs or concerns that I can assist you with?: No  Care Transitions Interventions                          Other Interventions:             Care Transition Nurse provided contact information for future needs. Plan for follow-up call in 10-14 days based on severity of symptoms and risk factors.   Plan for next call:  reassess for red flags    Adria Loyd RN

## 2023-10-24 ENCOUNTER — CARE COORDINATION (OUTPATIENT)
Facility: CLINIC | Age: 63
End: 2023-10-24

## 2023-10-24 NOTE — CARE COORDINATION
Care Transitions Outreach Attempt     Attempted to reach patient for transitions of care follow up. Unable to reach patient. Patient: Katharine Thompson Patient : 1960 MRN: [id number]    Last Discharge Facility       Date Complaint Diagnosis Description Type Department Provider    23 Chest Pain Chest pain, unspecified type ED to Hosp-Admission (Discharged) (ADMITTED) [facility] Mega Frazier MD; Jones Kothari. ..                Noted following upcoming appointments from discharge chart review:   [id number] [address] Cortez [facility],[address] follow up appointment(s):   Future Appointments   Date Time Provider [address]   2023  8:30 AM Bunny De, APRN - NP [facility] BS [facility]     Non-[facility]  follow up appointment(s): none

## 2023-12-28 RX ORDER — DOXEPIN HYDROCHLORIDE 25 MG/1
25 CAPSULE ORAL NIGHTLY
Qty: 90 CAPSULE | Refills: 0 | Status: SHIPPED | OUTPATIENT
Start: 2023-12-28

## 2024-03-01 ENCOUNTER — OFFICE VISIT (OUTPATIENT)
Facility: CLINIC | Age: 64
End: 2024-03-01
Payer: COMMERCIAL

## 2024-03-01 VITALS
HEIGHT: 63 IN | DIASTOLIC BLOOD PRESSURE: 87 MMHG | BODY MASS INDEX: 33.31 KG/M2 | OXYGEN SATURATION: 99 % | TEMPERATURE: 97.2 F | SYSTOLIC BLOOD PRESSURE: 140 MMHG | RESPIRATION RATE: 18 BRPM | WEIGHT: 188 LBS | HEART RATE: 82 BPM

## 2024-03-01 DIAGNOSIS — S93.402A MILD SPRAIN OF LEFT ANKLE, INITIAL ENCOUNTER: Primary | ICD-10-CM

## 2024-03-01 DIAGNOSIS — Z56.0 OUT OF WORK: ICD-10-CM

## 2024-03-01 DIAGNOSIS — L24.9 IRRITANT CONTACT DERMATITIS, UNSPECIFIED TRIGGER: ICD-10-CM

## 2024-03-01 DIAGNOSIS — Z91.81 AT HIGH RISK FOR FALLS: ICD-10-CM

## 2024-03-01 PROCEDURE — 3077F SYST BP >= 140 MM HG: CPT | Performed by: NURSE PRACTITIONER

## 2024-03-01 PROCEDURE — 1123F ACP DISCUSS/DSCN MKR DOCD: CPT | Performed by: NURSE PRACTITIONER

## 2024-03-01 PROCEDURE — 99214 OFFICE O/P EST MOD 30 MIN: CPT | Performed by: NURSE PRACTITIONER

## 2024-03-01 PROCEDURE — 3079F DIAST BP 80-89 MM HG: CPT | Performed by: NURSE PRACTITIONER

## 2024-03-01 RX ORDER — HYDROCHLOROTHIAZIDE 25 MG/1
25 TABLET ORAL DAILY
COMMUNITY
Start: 2024-02-05

## 2024-03-01 RX ORDER — SEMAGLUTIDE 2.4 MG/.75ML
2.4 INJECTION, SOLUTION SUBCUTANEOUS
COMMUNITY
Start: 2024-02-12 | End: 2024-05-12

## 2024-03-01 RX ORDER — TRIAMCINOLONE ACETONIDE 0.25 MG/G
OINTMENT TOPICAL
Qty: 80 G | Refills: 2 | Status: SHIPPED | OUTPATIENT
Start: 2024-03-01 | End: 2024-03-08

## 2024-03-01 RX ORDER — DILTIAZEM HYDROCHLORIDE 120 MG/1
120 CAPSULE, COATED, EXTENDED RELEASE ORAL DAILY
COMMUNITY
Start: 2024-02-26

## 2024-03-01 SDOH — ECONOMIC STABILITY - INCOME SECURITY: UNEMPLOYMENT, UNSPECIFIED: Z56.0

## 2024-03-01 ASSESSMENT — PATIENT HEALTH QUESTIONNAIRE - PHQ9
SUM OF ALL RESPONSES TO PHQ QUESTIONS 1-9: 0
2. FEELING DOWN, DEPRESSED OR HOPELESS: 0
SUM OF ALL RESPONSES TO PHQ9 QUESTIONS 1 & 2: 0
SUM OF ALL RESPONSES TO PHQ QUESTIONS 1-9: 0
1. LITTLE INTEREST OR PLEASURE IN DOING THINGS: 0

## 2024-03-01 NOTE — PROGRESS NOTES
History of Present Illness  Leeanne Maria is a 63 y.o. female who presents today for:    Chief Complaint   Patient presents with    Fall     Patient reports falling on Sunday, left leg is swollen and bruised, can hardly put weight on it       Past Medical History  Past Medical History:   Diagnosis Date    Arrhythmia     A fib    Atrial fibrillation (HCC)     Hypertension         Surgical History  Past Surgical History:   Procedure Laterality Date    CARDIAC ELECTROPHYSIOLOGY MAPPING AND ABLATION      ORTHOPEDIC SURGERY Bilateral     carpel tunnel    OTHER SURGICAL HISTORY      lap band    TOTAL KNEE ARTHROPLASTY Right 03/01/2020    TUBAL LIGATION          Current Medications  Current Outpatient Medications   Medication Sig    dilTIAZem (CARDIZEM CD) 120 MG extended release capsule Take 1 capsule by mouth daily    WEGOVY 2.4 MG/0.75ML SOAJ SC injection Inject 2.4 mg into the skin every 7 days    hydroCHLOROthiazide (HYDRODIURIL) 25 MG tablet Take 1 tablet by mouth daily    triamcinolone (KENALOG) 0.025 % ointment Apply topically 2 times daily.    doxepin (SINEQUAN) 25 MG capsule TAKE 1 CAPSULE BY MOUTH NIGHTLY    buPROPion (WELLBUTRIN XL) 150 MG extended release tablet Take 1 tablet by mouth daily    BD PEN NEEDLE MOISES 2ND GEN 32G X 4 MM MISC USE 1 PEN NEEDLE ONCE DAILY    propranolol (INDERAL) 10 MG tablet Take 1 tablet by mouth at bedtime    furosemide (LASIX) 20 MG tablet Take 1 tablet by mouth daily    ondansetron (ZOFRAN) 4 MG tablet Take 1 tablet by mouth every 8 hours as needed (Patient not taking: Reported on 3/1/2024)    omeprazole (PRILOSEC) 20 MG delayed release capsule Take 1 capsule every day by oral route. (Patient not taking: Reported on 3/1/2024)    sucralfate (CARAFATE) 1 GM/10ML suspension Take 10 mLs by mouth 4 times daily (Patient not taking: Reported on 3/1/2024)    potassium chloride (KLOR-CON M) 20 MEQ extended release tablet Take 1 tablet by mouth 2 times daily (Patient not taking:  Spontaneous, unlabored and symmetrical

## 2024-03-01 NOTE — PROGRESS NOTES
Leeanne Maria presents today for   Chief Complaint   Patient presents with    Fall     Patient reports falling on Sunday, left leg is swollen and bruised, can hardly put weight on it       Is someone accompanying this pt? no    Is the patient using any DME equipment during OV? no    Health Maintenance reviewed and discussed and ordered per Provider.    Health Maintenance Due   Topic Date Due    HIV screen  Never done    Hepatitis C screen  Never done    DTaP/Tdap/Td vaccine (1 - Tdap) Never done    Cervical cancer screen  Never done    Shingles vaccine (1 of 2) Never done    DEXA (modify frequency per FRAX score)  Never done    Respiratory Syncytial Virus (RSV) Pregnant or age 60 yrs+ (1 - 1-dose 60+ series) Never done    Pneumococcal 65+ years Vaccine (1 - PCV) Never done    Flu vaccine (1) 08/01/2023    COVID-19 Vaccine (4 - 2023-24 season) 09/01/2023   .      \"Have you been to the ER, urgent care clinic since your last visit?  Hospitalized since your last visit?\"    NO    “Have you seen or consulted any other health care providers outside of [facility] since your last visit?”    NO        “Have you had a pap smear?”    NO

## 2024-03-04 ENCOUNTER — HOSPITAL ENCOUNTER (OUTPATIENT)
Age: 64
Discharge: HOME | End: 2024-03-07
Payer: COMMERCIAL

## 2024-03-04 ENCOUNTER — OFFICE VISIT (OUTPATIENT)
Age: 64
End: 2024-03-04

## 2024-03-04 DIAGNOSIS — M25.562 ACUTE PAIN OF LEFT KNEE: ICD-10-CM

## 2024-03-04 DIAGNOSIS — M25.562 ACUTE PAIN OF LEFT KNEE: Primary | ICD-10-CM

## 2024-03-04 DIAGNOSIS — M25.562 LEFT KNEE PAIN, UNSPECIFIED CHRONICITY: Primary | ICD-10-CM

## 2024-03-04 PROCEDURE — 73564 X-RAY EXAM KNEE 4 OR MORE: CPT

## 2024-03-04 NOTE — PROGRESS NOTES
Name: Leeanne Maria    : 1960     [facility]  [address], [facility] [address]  [facility] [address]  Dept: [phone]  Dept Fax: [phone]     Chief Complaint   Patient presents with    Knee Pain        There were no vitals taken for this visit.     No Known Allergies     Current Outpatient Medications   Medication Sig Dispense Refill    dilTIAZem (CARDIZEM CD) 120 MG extended release capsule Take 1 capsule by mouth daily      WEGOVY 2.4 MG/0.75ML SOAJ SC injection Inject 2.4 mg into the skin every 7 days      hydroCHLOROthiazide (HYDRODIURIL) 25 MG tablet Take 1 tablet by mouth daily      triamcinolone (KENALOG) 0.025 % ointment Apply topically 2 times daily. 80 g 2    doxepin (SINEQUAN) 25 MG capsule TAKE 1 CAPSULE BY MOUTH NIGHTLY 90 capsule 0    ondansetron (ZOFRAN) 4 MG tablet Take 1 tablet by mouth every 8 hours as needed (Patient not taking: Reported on 3/1/2024)      omeprazole (PRILOSEC) 20 MG delayed release capsule Take 1 capsule every day by oral route. (Patient not taking: Reported on 3/1/2024)      sucralfate (CARAFATE) 1 GM/10ML suspension Take 10 mLs by mouth 4 times daily (Patient not taking: Reported on 3/1/2024)      potassium chloride (KLOR-CON M) 20 MEQ extended release tablet Take 1 tablet by mouth 2 times daily (Patient not taking: Reported on 3/1/2024) 60 tablet 5    buPROPion (WELLBUTRIN XL) 150 MG extended release tablet Take 1 tablet by mouth daily      BD PEN NEEDLE MOISES 2ND GEN 32G X 4 MM MISC USE 1 PEN NEEDLE ONCE DAILY      valACYclovir (VALTREX) 1 g tablet  (Patient not taking: Reported on 3/1/2024)      liraglutide-weight management 18 MG/3ML SOPN Inject into the skin (Patient not taking: Reported on 2023)      propranolol (INDERAL) 10 MG tablet Take 1 tablet by mouth at bedtime      furosemide (LASIX) 20 MG tablet Take 1 tablet by mouth daily       No current facility-administered

## 2024-03-08 ENCOUNTER — HOSPITAL ENCOUNTER (OUTPATIENT)
Age: 64
Discharge: HOME | End: 2024-03-08
Attending: ORTHOPAEDIC SURGERY
Payer: COMMERCIAL

## 2024-03-08 DIAGNOSIS — M25.562 LEFT KNEE PAIN, UNSPECIFIED CHRONICITY: ICD-10-CM

## 2024-03-08 PROCEDURE — 73721 MRI JNT OF LWR EXTRE W/O DYE: CPT

## 2024-03-11 ENCOUNTER — OFFICE VISIT (OUTPATIENT)
Age: 64
End: 2024-03-11
Payer: COMMERCIAL

## 2024-03-11 DIAGNOSIS — S82.135A CLOSED NONDISPLACED FRACTURE OF MEDIAL CONDYLE OF LEFT TIBIA, INITIAL ENCOUNTER: Primary | ICD-10-CM

## 2024-03-11 PROCEDURE — 99213 OFFICE O/P EST LOW 20 MIN: CPT | Performed by: ORTHOPAEDIC SURGERY

## 2024-03-11 PROCEDURE — 1123F ACP DISCUSS/DSCN MKR DOCD: CPT | Performed by: ORTHOPAEDIC SURGERY

## 2024-03-11 PROCEDURE — 27530 TREAT KNEE FRACTURE: CPT | Performed by: ORTHOPAEDIC SURGERY

## 2024-03-11 RX ORDER — OXYCODONE HYDROCHLORIDE AND ACETAMINOPHEN 5; 325 MG/1; MG/1
1 TABLET ORAL
Qty: 30 TABLET | Refills: 0 | Status: SHIPPED | OUTPATIENT
Start: 2024-03-11 | End: 2024-04-10

## 2024-03-11 NOTE — PROGRESS NOTES
left knee pain, throbbing, burning pain ever since she fell on 3/24/2024.  Pain is 5/10.    X-rays are unremarkable, but her left knee continues to have pain.  We got an MRI which shows she has got a medial tibial plateau fracture, non-impacted, and no significant displacement.    Assessment/Plan:  Plan at this point, partial weightbearing.  I did offer a walker.  She wants to wait.  She is going to use crutches.  We will keep her out of work for 4 weeks, repeat x-rays of her left knee AP, lateral and obliques next time we see her back in about 4 weeks.  My nurse practitioner will continue to follow her.  If she gets worse, she is to give me a call.  No restrictions in the meantime.  We will go from there.      As part of continued conservative pain management options the patient was advised to utilize Tylenol or OTC NSAIDS as long as it is not medically contraindicated.     Return to Office:    Follow-up and Dispositions    Return in about 3 weeks (around 4/1/2024) for W/ XRAYS, Hazel Lou NP, FX CARE.        Scribed by Anand Hollins MD as dictated by Anand Hollins MD.  Documentation, performed by, True and Accepted Anand Hollins MD

## 2024-03-19 ENCOUNTER — TELEPHONE (OUTPATIENT)
Age: 64
End: 2024-03-19

## 2024-03-19 DIAGNOSIS — S82.135A CLOSED NONDISPLACED FRACTURE OF MEDIAL CONDYLE OF LEFT TIBIA, INITIAL ENCOUNTER: Primary | ICD-10-CM

## 2024-03-19 PROCEDURE — MISCFMCMPLX MISC FORMS FEE - COMPLEX: Performed by: ORTHOPAEDIC SURGERY

## 2024-03-19 NOTE — TELEPHONE ENCOUNTER
Received [facility] paperwork from patient in office to complete regarding leave for fracture of leg.  Will complete and forward to  to collect one time form fee of $35.  Will contact patient once paperwork has been completed.

## 2024-04-01 ENCOUNTER — HOSPITAL ENCOUNTER (OUTPATIENT)
Age: 64
Discharge: HOME | End: 2024-04-04
Payer: COMMERCIAL

## 2024-04-01 DIAGNOSIS — M25.562 LEFT KNEE PAIN, UNSPECIFIED CHRONICITY: Primary | ICD-10-CM

## 2024-04-01 DIAGNOSIS — M25.562 LEFT KNEE PAIN, UNSPECIFIED CHRONICITY: ICD-10-CM

## 2024-04-01 PROCEDURE — 73564 X-RAY EXAM KNEE 4 OR MORE: CPT

## 2024-04-02 ENCOUNTER — OFFICE VISIT (OUTPATIENT)
Age: 64
End: 2024-04-02

## 2024-04-02 DIAGNOSIS — M25.562 LEFT KNEE PAIN, UNSPECIFIED CHRONICITY: Primary | ICD-10-CM

## 2024-04-02 DIAGNOSIS — S82.135D CLOSED NONDISPLACED FRACTURE OF MEDIAL CONDYLE OF LEFT TIBIA WITH ROUTINE HEALING, SUBSEQUENT ENCOUNTER: ICD-10-CM

## 2024-04-02 PROCEDURE — 99024 POSTOP FOLLOW-UP VISIT: CPT

## 2024-04-02 RX ORDER — AMOXICILLIN 500 MG/1
TABLET, FILM COATED ORAL
COMMUNITY
Start: 2024-03-28

## 2024-04-02 NOTE — PROGRESS NOTES
Name: Leeanne Maria    : 1960        3/1/2024     4:43 PM 3/1/2024     4:39 PM 2023     8:17 AM 2023     7:20 AM 2023     5:24 AM 2023     4:36 AM 2023     3:55 AM   Ambulatory Bariatric Summary   Systolic 140 140 125 125 133 133 143   Diastolic 87 87 71 71 65 67 69   Pulse  82  67 72 68 72   Temp  97.2 °F (36.2 °C)  97.6 °F (36.4 °C) 98 °F (36.7 °C)     Respirations  18  18 18 16 16   Weight - Scale  188 204  204.15     Height  1.6 m (5' 3\") 1.6 m (5' 3\")  1.6 m (5' 3\")     BMI  33.4 kg/m2 36.2 kg/m2  36.2 kg/m2     Weight - Scale  85.3 kg (188 lb) 92.5 kg (204 lb)  92.6 kg (204 lb 2.3 oz)     BMI (Calculated)  33.4 36.2  36.2         There is no height or weight on file to calculate BMI.    Service Dept: [facility] ORTHOPAEDICS AND SPORTS MEDICINE  [address] [place] [id number]  Dept: [phone]  Dept Fax: [phone]     Patient's Pharmacies:    [facility] [address] [place] [address] -  [phone] -  [phone]  [address] [place] [address]  Phone: [phone] Fax: [phone]       Chief Complaint   Patient presents with    Knee Pain    Fracture       HPI:  Patient presents for follow-up of a left medial tibial plateau fracture, noninfected, and no significant displacement that was seen on MRI after a fall on 3/4/2024.  Pain to the left knee is a 3-4 out of 10 and well-controlled with over-the-counter medications.  Patient has weaned down to ambulating with 1 crutch and would like and like to discuss return to work.    Xrays of the right knee performed at [facility], lateral and oblique are unremarkable with the exception of moderate OA.     There were no vitals taken for this visit.   No Known Allergies   Current Outpatient Medications   Medication Sig Dispense Refill    amoxicillin (AMOXIL) 500 MG tablet       oxyCODONE-acetaminophen (PERCOCET)

## 2024-04-03 ENCOUNTER — OFFICE VISIT (OUTPATIENT)
Facility: CLINIC | Age: 64
End: 2024-04-03
Payer: COMMERCIAL

## 2024-04-03 VITALS
HEART RATE: 81 BPM | DIASTOLIC BLOOD PRESSURE: 71 MMHG | WEIGHT: 185.2 LBS | TEMPERATURE: 97.2 F | BODY MASS INDEX: 32.82 KG/M2 | RESPIRATION RATE: 18 BRPM | SYSTOLIC BLOOD PRESSURE: 126 MMHG | OXYGEN SATURATION: 98 % | HEIGHT: 63 IN

## 2024-04-03 DIAGNOSIS — I48.91 ATRIAL FIBRILLATION, UNSPECIFIED TYPE (HCC): Primary | ICD-10-CM

## 2024-04-03 DIAGNOSIS — F41.1 GENERALIZED ANXIETY DISORDER: ICD-10-CM

## 2024-04-03 DIAGNOSIS — G47.9 SLEEP DISTURBANCE: ICD-10-CM

## 2024-04-03 PROCEDURE — 1123F ACP DISCUSS/DSCN MKR DOCD: CPT | Performed by: NURSE PRACTITIONER

## 2024-04-03 PROCEDURE — 3074F SYST BP LT 130 MM HG: CPT | Performed by: NURSE PRACTITIONER

## 2024-04-03 PROCEDURE — 3078F DIAST BP <80 MM HG: CPT | Performed by: NURSE PRACTITIONER

## 2024-04-03 PROCEDURE — 99214 OFFICE O/P EST MOD 30 MIN: CPT | Performed by: NURSE PRACTITIONER

## 2024-04-03 RX ORDER — IBUPROFEN 800 MG/1
TABLET ORAL
COMMUNITY
Start: 2024-04-03

## 2024-04-03 RX ORDER — DOXEPIN HYDROCHLORIDE 25 MG/1
25 CAPSULE ORAL NIGHTLY
Qty: 90 CAPSULE | Refills: 1 | Status: SHIPPED | OUTPATIENT
Start: 2024-04-03

## 2024-04-03 RX ORDER — METHYLPREDNISOLONE 4 MG/1
TABLET ORAL
COMMUNITY
Start: 2024-04-03

## 2024-04-03 SDOH — ECONOMIC STABILITY: FOOD INSECURITY: WITHIN THE PAST 12 MONTHS, YOU WORRIED THAT YOUR FOOD WOULD RUN OUT BEFORE YOU GOT MONEY TO BUY MORE.: NEVER TRUE

## 2024-04-03 SDOH — ECONOMIC STABILITY: FOOD INSECURITY: WITHIN THE PAST 12 MONTHS, THE FOOD YOU BOUGHT JUST DIDN'T LAST AND YOU DIDN'T HAVE MONEY TO GET MORE.: NEVER TRUE

## 2024-04-03 SDOH — ECONOMIC STABILITY: INCOME INSECURITY: HOW HARD IS IT FOR YOU TO PAY FOR THE VERY BASICS LIKE FOOD, HOUSING, MEDICAL CARE, AND HEATING?: NOT HARD AT ALL

## 2024-04-03 NOTE — PROGRESS NOTES
Leeanne Maria presents today for   Chief Complaint   Patient presents with    Follow-up     1m follow up       Is someone accompanying this pt? no    Is the patient using any DME equipment during OV? non    Health Maintenance reviewed and discussed and ordered per Provider.    Health Maintenance Due   Topic Date Due    HIV screen  Never done    Hepatitis C screen  Never done    DTaP/Tdap/Td vaccine (1 - Tdap) Never done    Cervical cancer screen  Never done    Shingles vaccine (1 of 2) Never done    DEXA (modify frequency per FRAX score)  Never done    Respiratory Syncytial Virus (RSV) Pregnant or age 60 yrs+ (1 - 1-dose 60+ series) Never done    Pneumococcal 65+ years Vaccine (1 of 1 - PCV) Never done    COVID-19 Vaccine (4 - 2023-24 season) 09/01/2023   .      \"Have you been to the ER, urgent care clinic since your last visit?  Hospitalized since your last visit?\"    NO    “Have you seen or consulted any other health care providers outside of [facility] since your last visit?”    NO        “Have you had a pap smear?”    NO    No cervical cancer screening on file           
Sodium 09/21/2023 141  136 - 145 mmol/L Final    Potassium 09/21/2023 3.1 (L)  3.5 - 5.5 mmol/L Final    Chloride 09/21/2023 105  100 - 111 mmol/L Final    CO2 09/21/2023 28  21 - 32 mmol/L Final    Anion Gap 09/21/2023 8  3.0 - 18.0 mmol/L Final    Glucose 09/21/2023 108 (H)  74 - 99 mg/dL Final    BUN 09/21/2023 21 (H)  7 - 18 mg/dL Final    Creatinine 09/21/2023 1.04  0.60 - 1.30 mg/dL Final    Bun/Cre Ratio 09/21/2023 20  12 - 20   Final    Est, Glom Filt Rate 09/21/2023 60 (L)  >60 ml/min/1.73m2 Final    Comment:    Pediatric calculator link: https://www.kidney.org/professionals/kdoqi/gfr_calculatorped    These results are not intended for use in patients <18 years of age.     eGFR results are calculated without a race factor using  the 2021 CKD-EPI equation. Careful clinical correlation is recommended, particularly when comparing to results calculated using previous equations.  The CKD-EPI equation is less accurate in patients with extremes of muscle mass, extra-renal metabolism of creatinine, excessive creatine ingestion, or following therapy that affects renal tubular secretion.      Calcium 09/21/2023 9.2  8.5 - 10.1 mg/dL Final    Total Bilirubin 09/21/2023 0.5  0.2 - 1.0 mg/dL Final    AST 09/21/2023 17  10 - 38 U/L Final    ALT 09/21/2023 21  13 - 56 U/L Final    Alk Phosphatase 09/21/2023 122 (H)  45 - 117 U/L Final    Total Protein 09/21/2023 6.4  6.4 - 8.2 g/dL Final    Albumin 09/21/2023 3.6  3.4 - 5.0 g/dL Final    Globulin 09/21/2023 2.8  2.0 - 4.0 g/dL Final    Albumin/Globulin Ratio 09/21/2023 1.3  0.8 - 1.7   Final    Troponin, High Sensitivity 09/21/2023 9  0 - 54 ng/L Final    A HS troponin value change of (+ or -)50% or more below the 99th percentile, in a 1/2/3hr interval represents a significant change. Clinical correlation is recommended. A HS troponin value change of (+ or -)20% or more above the 99th percentile, in a 1/2/3 hr interval represents a significant change. Clinical correlation

## 2024-04-30 ENCOUNTER — HOSPITAL ENCOUNTER (OUTPATIENT)
Age: 64
Discharge: HOME | End: 2024-05-03
Payer: COMMERCIAL

## 2024-04-30 ENCOUNTER — OFFICE VISIT (OUTPATIENT)
Age: 64
End: 2024-04-30

## 2024-04-30 DIAGNOSIS — S82.135D CLOSED NONDISPLACED FRACTURE OF MEDIAL CONDYLE OF LEFT TIBIA WITH ROUTINE HEALING, SUBSEQUENT ENCOUNTER: ICD-10-CM

## 2024-04-30 DIAGNOSIS — M25.562 LEFT KNEE PAIN, UNSPECIFIED CHRONICITY: ICD-10-CM

## 2024-04-30 DIAGNOSIS — M25.562 LEFT KNEE PAIN, UNSPECIFIED CHRONICITY: Primary | ICD-10-CM

## 2024-04-30 PROCEDURE — 99024 POSTOP FOLLOW-UP VISIT: CPT

## 2024-04-30 PROCEDURE — 73562 X-RAY EXAM OF KNEE 3: CPT

## 2024-04-30 NOTE — PROGRESS NOTES
Name: Leeanne Maria    : 1960        4/3/2024     3:34 PM 3/1/2024     4:43 PM 3/1/2024     4:39 PM 2023     8:17 AM 2023     7:20 AM 2023     5:24 AM 2023     4:36 AM   Ambulatory Bariatric Summary   Systolic 126 140 140 125 125 133 133   Diastolic 71 87 87 71 71 65 67   Pulse 81  82  67 72 68   Temp 97.2 °F (36.2 °C)  97.2 °F (36.2 °C)  97.6 °F (36.4 °C) 98 °F (36.7 °C)    Respirations 18  18  18 18 16   Weight - Scale 185.2  188 204  204.15    Height 1.6 m (5' 3\")  1.6 m (5' 3\") 1.6 m (5' 3\")  1.6 m (5' 3\")    BMI 32.9 kg/m2  33.4 kg/m2 36.2 kg/m2  36.2 kg/m2    Weight - Scale 84 kg (185 lb 3.2 oz)  85.3 kg (188 lb) 92.5 kg (204 lb)  92.6 kg (204 lb 2.3 oz)    BMI (Calculated) 32.9  33.4 36.2  36.2        There is no height or weight on file to calculate BMI.    Service Dept: [facility] ORTHOPAEDICS AND SPORTS MEDICINE  [address] [place] [address], [facility]  [facility] [address]  Dept: [phone]  Dept Fax: [phone]     Patient's Pharmacies:    [facility] [address] [place] [address] -  [phone] - F [phone]  [address] [place] [address]  Phone: [phone] Fax: [phone]       Chief Complaint   Patient presents with    Post-Op Check    Knee Pain       HPI:  Patient presents for follow-up of a left medial tibial plateau fracture, noninfected, and no significant displacement that was seen on MRI after a fall on 3/4/2024.  Pain to the left knee is a 1 out of 10 and well-controlled with over-the-counter medications as needed. Patient has returned to work and is ambulating well without assistive devices.     Xrays of the right knee performed at [facility], lateral and oblique are unremarkable with the exception of moderate going on severe OA.     There were no vitals taken for this visit.   No Known Allergies   Current Outpatient Medications   Medication Sig Dispense Refill

## 2024-05-16 ENCOUNTER — OFFICE VISIT (OUTPATIENT)
Facility: CLINIC | Age: 64
End: 2024-05-16
Payer: COMMERCIAL

## 2024-05-16 VITALS
RESPIRATION RATE: 18 BRPM | TEMPERATURE: 97.2 F | WEIGHT: 186.2 LBS | BODY MASS INDEX: 32.99 KG/M2 | DIASTOLIC BLOOD PRESSURE: 77 MMHG | HEART RATE: 76 BPM | HEIGHT: 63 IN | OXYGEN SATURATION: 98 % | SYSTOLIC BLOOD PRESSURE: 128 MMHG

## 2024-05-16 DIAGNOSIS — L03.90 CELLULITIS, UNSPECIFIED CELLULITIS SITE: Primary | ICD-10-CM

## 2024-05-16 PROCEDURE — 1123F ACP DISCUSS/DSCN MKR DOCD: CPT | Performed by: STUDENT IN AN ORGANIZED HEALTH CARE EDUCATION/TRAINING PROGRAM

## 2024-05-16 PROCEDURE — 3078F DIAST BP <80 MM HG: CPT | Performed by: STUDENT IN AN ORGANIZED HEALTH CARE EDUCATION/TRAINING PROGRAM

## 2024-05-16 PROCEDURE — 99213 OFFICE O/P EST LOW 20 MIN: CPT | Performed by: STUDENT IN AN ORGANIZED HEALTH CARE EDUCATION/TRAINING PROGRAM

## 2024-05-16 PROCEDURE — 3074F SYST BP LT 130 MM HG: CPT | Performed by: STUDENT IN AN ORGANIZED HEALTH CARE EDUCATION/TRAINING PROGRAM

## 2024-05-16 RX ORDER — CEPHALEXIN 250 MG/1
250 CAPSULE ORAL 4 TIMES DAILY
Qty: 28 CAPSULE | Refills: 0 | Status: SHIPPED | OUTPATIENT
Start: 2024-05-16 | End: 2024-05-23

## 2024-05-16 NOTE — PROGRESS NOTES
SICK VISIT     Leeanne Maria (: 1960) is a 63 y.o. female here for evaluation of the following chief concerns(s):  Leg Swelling (Patient reports yesterday she noticed a spot on her leg where she was working in the yard and it is now red, swollen, and painful. Looks like 2 small bites, states it was painful to walk on it last night )       ASSESSMENT/PLAN:  1. Cellulitis, unspecified cellulitis site  -     cephALEXin (KEFLEX) 250 MG capsule; Take 1 capsule by mouth 4 times daily for 7 days, Disp-28 capsule, R-0Normal    No orders of the defined types were placed in this encounter.    Cellulitis- treat with Keflex- discussed side effect profile with patient  Return precautions.     FU as scheduled for chronic disease    Patient agrees with plan as above and has no additional questions at this time.     SUBJECTIVE/OBJECTIVE:    Patient reports yesterday she noticed a spot on her leg where she was working in the yard and it is now red, swollen, and painful. Looks like 2 small bites, states it was painful to walk on it last night. No fevers or chills. No h/o MRSA    Vitals:    24 1438   BP: 128/77   Site: Left Upper Arm   Position: Sitting   Cuff Size: Medium Adult   Pulse: 76   Resp: 18   Temp: 97.2 °F (36.2 °C)   TempSrc: Temporal   SpO2: 98%   Weight: 84.5 kg (186 lb 3.2 oz)   Height: 1.6 m (5' 3\")      Body mass index is 32.98 kg/m².     Gen: NAD, well appearing   LLE: erythematous, tender, warm area on medial shin.   Psych: cooperative. Appropriate mood and affect.    Medical decision making complexity: low    I have discussed the diagnosis with the patient and the intended plan as seen in the above orders.  The patient has received an after-visit summary and questions were answered concerning future plans.  I have discussed medication side effects and warnings with the patient as well. I have reviewed the plan of care with the patient, accepted their input and they are in agreement with the

## 2024-05-16 NOTE — PROGRESS NOTES
Leeanne Nievesmario alberto Maria presents today for   Chief Complaint   Patient presents with    Leg Swelling     Patient reports yesterday she noticed a spot on her leg where she was working in the yard and it is now red, swollen, and painful. Looks like 2 small bites, states it was painful to walk on it last night        Is someone accompanying this pt? no    Is the patient using any DME equipment during OV? no    Health Maintenance reviewed and discussed and ordered per Provider.    Health Maintenance Due   Topic Date Due    HIV screen  Never done    Hepatitis C screen  Never done    DTaP/Tdap/Td vaccine (1 - Tdap) Never done    Cervical cancer screen  Never done    Shingles vaccine (1 of 2) Never done    DEXA (modify frequency per FRAX score)  Never done    Respiratory Syncytial Virus (RSV) Pregnant or age 60 yrs+ (1 - 1-dose 60+ series) Never done    Pneumococcal 65+ years Vaccine (1 of 1 - PCV) Never done    COVID-19 Vaccine (4 - 2023-24 season) 09/01/2023   .      \"Have you been to the ER, urgent care clinic since your last visit?  Hospitalized since your last visit?\"    NO    “Have you seen or consulted any other health care providers outside of [facility] since your last visit?”    NO

## 2024-10-03 DIAGNOSIS — G47.9 SLEEP DISTURBANCE: ICD-10-CM

## 2024-10-03 RX ORDER — DOXEPIN HYDROCHLORIDE 25 MG/1
25 CAPSULE ORAL NIGHTLY
Qty: 90 CAPSULE | Refills: 1 | Status: SHIPPED | OUTPATIENT
Start: 2024-10-03

## 2024-11-12 RX ORDER — TIRZEPATIDE 15 MG/.5ML
INJECTION, SOLUTION SUBCUTANEOUS
COMMUNITY
Start: 2024-09-11

## 2024-11-20 ENCOUNTER — OFFICE VISIT (OUTPATIENT)
Facility: CLINIC | Age: 64
End: 2024-11-20
Payer: COMMERCIAL

## 2024-11-20 VITALS
DIASTOLIC BLOOD PRESSURE: 80 MMHG | OXYGEN SATURATION: 99 % | HEART RATE: 80 BPM | BODY MASS INDEX: 32.46 KG/M2 | WEIGHT: 183.2 LBS | HEIGHT: 63 IN | TEMPERATURE: 97.4 F | RESPIRATION RATE: 18 BRPM | SYSTOLIC BLOOD PRESSURE: 136 MMHG

## 2024-11-20 DIAGNOSIS — S81.802A WOUND OF LEFT LOWER EXTREMITY, INITIAL ENCOUNTER: ICD-10-CM

## 2024-11-20 DIAGNOSIS — I48.0 PAROXYSMAL ATRIAL FIBRILLATION (HCC): ICD-10-CM

## 2024-11-20 DIAGNOSIS — Z13.220 SCREENING CHOLESTEROL LEVEL: ICD-10-CM

## 2024-11-20 DIAGNOSIS — Z00.00 MEDICARE ANNUAL WELLNESS VISIT, SUBSEQUENT: ICD-10-CM

## 2024-11-20 DIAGNOSIS — I10 ESSENTIAL HYPERTENSION: Primary | ICD-10-CM

## 2024-11-20 DIAGNOSIS — Z11.59 NEED FOR HEPATITIS C SCREENING TEST: ICD-10-CM

## 2024-11-20 DIAGNOSIS — R68.89 OTHER GENERAL SYMPTOMS AND SIGNS: ICD-10-CM

## 2024-11-20 DIAGNOSIS — R79.9 ABNORMAL FINDING OF BLOOD CHEMISTRY, UNSPECIFIED: ICD-10-CM

## 2024-11-20 DIAGNOSIS — Z78.0 POST-MENOPAUSAL: ICD-10-CM

## 2024-11-20 DIAGNOSIS — L57.0 ACTINIC KERATOSES: ICD-10-CM

## 2024-11-20 DIAGNOSIS — R00.2 PALPITATIONS: ICD-10-CM

## 2024-11-20 DIAGNOSIS — G47.9 SLEEP DISTURBANCE: ICD-10-CM

## 2024-11-20 PROBLEM — I48.91 ATRIAL FIBRILLATION WITH RVR (HCC): Status: RESOLVED | Noted: 2022-07-01 | Resolved: 2024-11-20

## 2024-11-20 PROBLEM — I48.91 ATRIAL FIBRILLATION (HCC): Status: RESOLVED | Noted: 2022-09-14 | Resolved: 2024-11-20

## 2024-11-20 PROCEDURE — 3079F DIAST BP 80-89 MM HG: CPT | Performed by: NURSE PRACTITIONER

## 2024-11-20 PROCEDURE — 1123F ACP DISCUSS/DSCN MKR DOCD: CPT | Performed by: NURSE PRACTITIONER

## 2024-11-20 PROCEDURE — 3075F SYST BP GE 130 - 139MM HG: CPT | Performed by: NURSE PRACTITIONER

## 2024-11-20 PROCEDURE — 99214 OFFICE O/P EST MOD 30 MIN: CPT | Performed by: NURSE PRACTITIONER

## 2024-11-20 RX ORDER — CEPHALEXIN 500 MG/1
500 CAPSULE ORAL 2 TIMES DAILY
Qty: 14 CAPSULE | Refills: 0 | Status: SHIPPED | OUTPATIENT
Start: 2024-11-20 | End: 2024-11-27

## 2024-11-20 NOTE — PROGRESS NOTES
Chief Complaint   Patient presents with    Follow-up     6 month follow up        \"Have you been to the ER, urgent care clinic since your last visit?  Hospitalized since your last visit?\"    NO    “Have you seen or consulted any other health care providers outside of [facility] since your last visit?”    NO            
E/E' Ratio (Averaged) 09/21/2023 9.50   Final    E/E' Lateral 09/21/2023 8.00   Final    E/E' Septal 09/21/2023 11.00   Final    LA Volume Index BP 09/21/2023 37 (A)  16 - 34 ml/m2 Final    LVOT Stroke Volume Index 09/21/2023 57.5  mL/m2 Final    LA Volume Index A-L A2C 09/21/2023 42 (A)  16 - 34 mL/m2 Final    LA Volume Index A-L A4C 09/21/2023 33  16 - 34 mL/m2 Final    LA Size Index 09/21/2023 2.10  cm/m2 Final    LA/AO Root Ratio 09/21/2023 1.28   Final    RA Volume Index A4C 09/21/2023 25  mL/m2 Final    Ao Root Index 09/21/2023 1.64  cm/m2 Final    Ascending Aorta Index 09/21/2023 1.74  cm/m2 Final    AV Velocity Ratio 09/21/2023 0.72   Final    LVOT:AV VTI Index 09/21/2023 0.79   Final    RIMMA/BSA VTI 09/21/2023 1.5  cm2/m2 Final    RIMMA/BSA Peak Velocity 09/21/2023 1.4  cm2/m2 Final    MV:LVOT VTI Index 09/21/2023 0.90   Final    RVSP 09/21/2023 26  mmHg Final    Protime 09/21/2023 12.0  11.9 - 14.7 sec Final    PLEASE NOTE NEW REFERENCE RANGE    INR 09/21/2023 0.9  0.9 - 1.1   Final               INR Therapeutic Ranges         (on stable oral anticoagulant):    INDICATION                INR DVT/PE/Atrial Fib          2.0-3.0 MI/Mechanical Heart Valve  2.5-3.5 PLEASE NOTE NEW REFERENCE RANGE    Ventricular Rate 09/21/2023 68  BPM Final    Atrial Rate 09/21/2023 68  BPM Final    P-R Interval 09/21/2023 174  ms Final    QRS Duration 09/21/2023 96  ms Final    Q-T Interval 09/21/2023 390  ms Final    QTc Calculation (Bazett) 09/21/2023 414  ms Final    P Axis 09/21/2023 51  degrees Final    R Axis 09/21/2023 -2  degrees Final    T Axis 09/21/2023 14  degrees Final    Diagnosis 09/21/2023    Final                    Value:Normal sinus rhythm  Normal ECG  When compared with ECG of 21-SEP-2023 01:56, (Unconfirmed)  Criteria for Inferior infarct are no longer Present  Confirmed by Gerardo Jimenez ([id number]) on 9/22/2023 9:20:57 AM      Troponin, High Sensitivity 09/21/2023 8  0 - 54 ng/L Final    A HS troponin value

## 2024-12-19 ENCOUNTER — TRANSCRIBE ORDERS (OUTPATIENT)
Facility: HOSPITAL | Age: 64
End: 2024-12-19

## 2024-12-19 DIAGNOSIS — Z12.31 VISIT FOR SCREENING MAMMOGRAM: Primary | ICD-10-CM

## 2025-02-25 ENCOUNTER — HOSPITAL ENCOUNTER (EMERGENCY)
Age: 65
Discharge: HOME | End: 2025-02-25
Attending: EMERGENCY MEDICINE
Payer: COMMERCIAL

## 2025-02-25 ENCOUNTER — APPOINTMENT (OUTPATIENT)
Age: 65
End: 2025-02-25
Payer: COMMERCIAL

## 2025-02-25 VITALS
HEIGHT: 63 IN | HEART RATE: 76 BPM | SYSTOLIC BLOOD PRESSURE: 159 MMHG | TEMPERATURE: 97.4 F | RESPIRATION RATE: 18 BRPM | WEIGHT: 196 LBS | OXYGEN SATURATION: 97 % | DIASTOLIC BLOOD PRESSURE: 79 MMHG | BODY MASS INDEX: 34.73 KG/M2

## 2025-02-25 DIAGNOSIS — R07.89 OTHER CHEST PAIN: Primary | ICD-10-CM

## 2025-02-25 DIAGNOSIS — Z87.19 HISTORY OF ESOPHAGITIS: ICD-10-CM

## 2025-02-25 DIAGNOSIS — K21.9 CHEST PAIN DUE TO GERD: ICD-10-CM

## 2025-02-25 DIAGNOSIS — R07.9 CHEST PAIN DUE TO GERD: ICD-10-CM

## 2025-02-25 LAB
ALBUMIN SERPL-MCNC: 3.6 G/DL (ref 3.4–5)
ALBUMIN/GLOB SERPL: 1.1 (ref 0.8–1.7)
ALP SERPL-CCNC: 174 U/L (ref 45–117)
ALT SERPL-CCNC: 29 U/L (ref 13–56)
ANION GAP SERPL CALC-SCNC: 5 MMOL/L (ref 3–18)
AST SERPL W P-5'-P-CCNC: 25 U/L (ref 10–38)
BASOPHILS # BLD: 0.08 K/UL (ref 0–0.1)
BASOPHILS NFR BLD: 0.8 % (ref 0–2)
BILIRUB SERPL-MCNC: 0.3 MG/DL (ref 0.2–1)
BUN SERPL-MCNC: 41 MG/DL (ref 7–18)
BUN/CREAT SERPL: 39 (ref 12–20)
CA-I BLD-MCNC: 9.8 MG/DL (ref 8.5–10.1)
CHLORIDE SERPL-SCNC: 103 MMOL/L (ref 100–111)
CO2 SERPL-SCNC: 32 MMOL/L (ref 21–32)
CREAT SERPL-MCNC: 1.04 MG/DL (ref 0.6–1.3)
DIFFERENTIAL METHOD BLD: ABNORMAL
EKG ATRIAL RATE: 74 BPM
EKG DIAGNOSIS: NORMAL
EKG P AXIS: 60 DEGREES
EKG P-R INTERVAL: 182 MS
EKG Q-T INTERVAL: 390 MS
EKG QRS DURATION: 98 MS
EKG QTC CALCULATION (BAZETT): 432 MS
EKG R AXIS: 22 DEGREES
EKG T AXIS: 41 DEGREES
EKG VENTRICULAR RATE: 74 BPM
EOSINOPHIL # BLD: 0.45 K/UL (ref 0–0.4)
EOSINOPHIL NFR BLD: 4.5 % (ref 0–5)
ERYTHROCYTE [DISTWIDTH] IN BLOOD BY AUTOMATED COUNT: 13.7 % (ref 11.6–14.5)
GLOBULIN SER CALC-MCNC: 3.4 G/DL (ref 2–4)
GLUCOSE SERPL-MCNC: 110 MG/DL (ref 74–99)
HCT VFR BLD AUTO: 38.6 % (ref 35–45)
HGB BLD-MCNC: 12.7 G/DL (ref 12–16)
IMM GRANULOCYTES # BLD AUTO: 0.04 K/UL (ref 0–0.04)
IMM GRANULOCYTES NFR BLD AUTO: 0.4 % (ref 0–0.5)
LYMPHOCYTES # BLD: 3.11 K/UL (ref 0.9–3.6)
LYMPHOCYTES NFR BLD: 30.9 % (ref 21–52)
MAGNESIUM SERPL-MCNC: 2.1 MG/DL (ref 1.6–2.6)
MCH RBC QN AUTO: 29.4 PG (ref 24–34)
MCHC RBC AUTO-ENTMCNC: 32.9 G/DL (ref 31–37)
MCV RBC AUTO: 89.4 FL (ref 78–100)
MONOCYTES # BLD: 1.2 K/UL (ref 0.05–1.2)
MONOCYTES NFR BLD: 11.9 % (ref 3–10)
NEUTS SEG # BLD: 5.17 K/UL (ref 1.8–8)
NEUTS SEG NFR BLD: 51.5 % (ref 40–73)
NRBC # BLD: 0 K/UL (ref 0–0.01)
NRBC BLD-RTO: 0 PER 100 WBC
PLATELET # BLD AUTO: 314 K/UL (ref 135–420)
PMV BLD AUTO: 9.8 FL (ref 9.2–11.8)
POTASSIUM SERPL-SCNC: 3.5 MMOL/L (ref 3.5–5.5)
PROT SERPL-MCNC: 7 G/DL (ref 6.4–8.2)
RBC # BLD AUTO: 4.32 M/UL (ref 4.2–5.3)
SODIUM SERPL-SCNC: 140 MMOL/L (ref 136–145)
TROPONIN I SERPL HS-MCNC: 4 NG/L (ref 0–54)
TROPONIN I SERPL HS-MCNC: 4 NG/L (ref 0–54)
WBC # BLD AUTO: 10.1 K/UL (ref 4.6–13.2)

## 2025-02-25 PROCEDURE — 93005 ELECTROCARDIOGRAM TRACING: CPT | Performed by: EMERGENCY MEDICINE

## 2025-02-25 PROCEDURE — 6370000000 HC RX 637 (ALT 250 FOR IP): Performed by: EMERGENCY MEDICINE

## 2025-02-25 PROCEDURE — 99285 EMERGENCY DEPT VISIT HI MDM: CPT

## 2025-02-25 PROCEDURE — 80053 COMPREHEN METABOLIC PANEL: CPT

## 2025-02-25 PROCEDURE — 84484 ASSAY OF TROPONIN QUANT: CPT

## 2025-02-25 PROCEDURE — 36415 COLL VENOUS BLD VENIPUNCTURE: CPT

## 2025-02-25 PROCEDURE — 83735 ASSAY OF MAGNESIUM: CPT

## 2025-02-25 PROCEDURE — 85025 COMPLETE CBC W/AUTO DIFF WBC: CPT

## 2025-02-25 PROCEDURE — 71045 X-RAY EXAM CHEST 1 VIEW: CPT

## 2025-02-25 RX ORDER — ASPIRIN 81 MG/1
324 TABLET, CHEWABLE ORAL DAILY
Status: DISCONTINUED | OUTPATIENT
Start: 2025-02-25 | End: 2025-02-25

## 2025-02-25 RX ORDER — ASPIRIN 81 MG/1
324 TABLET, CHEWABLE ORAL
Status: COMPLETED | OUTPATIENT
Start: 2025-02-25 | End: 2025-02-25

## 2025-02-25 RX ORDER — SUCRALFATE ORAL 1 G/10ML
1 SUSPENSION ORAL 4 TIMES DAILY
Qty: 1200 ML | Refills: 3 | Status: SHIPPED | OUTPATIENT
Start: 2025-02-25

## 2025-02-25 RX ORDER — MAGNESIUM HYDROXIDE/ALUMINUM HYDROXICE/SIMETHICONE 120; 1200; 1200 MG/30ML; MG/30ML; MG/30ML
30 SUSPENSION ORAL
Status: COMPLETED | OUTPATIENT
Start: 2025-02-25 | End: 2025-02-25

## 2025-02-25 RX ORDER — OMEPRAZOLE 20 MG/1
20 CAPSULE, DELAYED RELEASE ORAL
Qty: 30 CAPSULE | Refills: 0 | Status: SHIPPED | OUTPATIENT
Start: 2025-02-25

## 2025-02-25 RX ORDER — LIDOCAINE HYDROCHLORIDE 20 MG/ML
15 SOLUTION OROPHARYNGEAL
Status: COMPLETED | OUTPATIENT
Start: 2025-02-25 | End: 2025-02-25

## 2025-02-25 RX ADMIN — NITROGLYCERIN 0.5 INCH: 20 OINTMENT TOPICAL at 02:58

## 2025-02-25 RX ADMIN — LIDOCAINE HYDROCHLORIDE 15 ML: 20 SOLUTION ORAL at 05:26

## 2025-02-25 RX ADMIN — ALUMINUM HYDROXIDE, MAGNESIUM HYDROXIDE, AND SIMETHICONE 30 ML: 200; 200; 20 SUSPENSION ORAL at 05:26

## 2025-02-25 RX ADMIN — ASPIRIN 324 MG: 81 TABLET, CHEWABLE ORAL at 02:57

## 2025-02-25 ASSESSMENT — PAIN DESCRIPTION - LOCATION
LOCATION: CHEST
LOCATION: CHEST

## 2025-02-25 ASSESSMENT — ENCOUNTER SYMPTOMS
DIARRHEA: 0
SINUS PAIN: 0
RHINORRHEA: 0
CONSTIPATION: 0
NAUSEA: 0
SHORTNESS OF BREATH: 0
CHEST TIGHTNESS: 0
ABDOMINAL PAIN: 0
COUGH: 0
SORE THROAT: 0

## 2025-02-25 ASSESSMENT — PAIN SCALES - GENERAL
PAINLEVEL_OUTOF10: 6
PAINLEVEL_OUTOF10: 7

## 2025-02-25 ASSESSMENT — PAIN - FUNCTIONAL ASSESSMENT: PAIN_FUNCTIONAL_ASSESSMENT: 0-10

## 2025-02-25 NOTE — ED PROVIDER NOTES
EMERGENCY DEPARTMENT HISTORY AND PHYSICAL EXAM      Date: 2025  Patient Name: Leeanne Maria  Patient Age and Sex: 64 y.o. female     History of Presenting Illness     Chief Complaint   Patient presents with    Palpitations       History Provided By: Patient    HPI: Leeanne Maria patient is a 64-year-old female who comes in complaining of chest pressure.  Patient states that about 9:30 PM she stated with pressure in her chest.  She tried to play around with his at home and get the pain controlled and pressure controlled.  She states she showered, she can get normal try to keep her mind off of it.  It continued.  She did try Antacids without any relief.  Patient has a history of A-fib, she does have a loop recorder she has had ablation.  Some for the A-fib right now has been under control.  And she was able to get off the Eliquis.  She does take 1 baby aspirin a day.  Patient states she also has hypertension, anxiety, she states 1 month ago she had COVID and was treated with Paxlovid and since then her left ankles been swelling more and she is feels like she is getting a little bit of weight.  She states she has never had pressure like this in her chest.  NSAIDs seem like heartburn hence when the antacids did not help she thought she got a get checked.    There are no other complaints, changes, or physical findings at this time.    Past History     Past Medical History:  Past Medical History:   Diagnosis Date    Arrhythmia     A fib    Atrial fibrillation (HCC)     Hypertension        Past Surgical History:  Past Surgical History:   Procedure Laterality Date    ABDOMEN SURGERY  12 yrs ago    Lab band surgery    CARDIAC ELECTROPHYSIOLOGY MAPPING AND ABLATION      CARDIAC SURGERY  10/01/2022    Ablation for a  fib    CARPAL TUNNEL RELEASE       SECTION      HAND SURGERY  20 yrs ago    Carpel tunnel    JOINT REPLACEMENT      KNEE SURGERY  2021    Jiffy knee right leg    ORTHOPEDIC SURGERY

## 2025-02-25 NOTE — DISCHARGE INSTRUCTIONS
After discussion with you you said that you had used sulcal fate in the past and omeprazole when you had some esophagitis.  But you have been having some issues similar to that in the recent past.  Will go ahead and write you for some more the sulcal fate and the omeprazole and have you follow-up with your doctor for reevaluation.    However if you have chest pain that is pressure in your chest radiating to your neck or your arm please return to the ER please return as necessary.

## 2025-04-01 RX ORDER — OMEPRAZOLE 20 MG/1
20 CAPSULE, DELAYED RELEASE ORAL
Qty: 90 CAPSULE | Refills: 1 | Status: SHIPPED | OUTPATIENT
Start: 2025-04-01

## 2025-04-01 NOTE — TELEPHONE ENCOUNTER
Patient requesting refill on omeprazole originally prescribed by ED Provider.     [facility]

## 2025-04-13 DIAGNOSIS — G47.9 SLEEP DISTURBANCE: ICD-10-CM

## 2025-04-14 RX ORDER — DOXEPIN HYDROCHLORIDE 25 MG/1
25 CAPSULE ORAL NIGHTLY
Qty: 90 CAPSULE | Refills: 3 | Status: SHIPPED | OUTPATIENT
Start: 2025-04-14

## 2025-04-14 NOTE — TELEPHONE ENCOUNTER
Patient called requesting a refill on medication. Would like for prescription to be sent to [facility] in [place], [place].

## 2025-04-15 ENCOUNTER — OFFICE VISIT (OUTPATIENT)
Age: 65
End: 2025-04-15
Payer: COMMERCIAL

## 2025-04-15 ENCOUNTER — TELEPHONE (OUTPATIENT)
Facility: CLINIC | Age: 65
End: 2025-04-15

## 2025-04-15 DIAGNOSIS — M25.562 LEFT KNEE PAIN, UNSPECIFIED CHRONICITY: Primary | ICD-10-CM

## 2025-04-15 DIAGNOSIS — M17.12 UNILATERAL PRIMARY OSTEOARTHRITIS, LEFT KNEE: ICD-10-CM

## 2025-04-15 DIAGNOSIS — M17.12 OSTEOARTHRITIS OF LEFT KNEE, UNSPECIFIED OSTEOARTHRITIS TYPE: ICD-10-CM

## 2025-04-15 PROCEDURE — 20611 DRAIN/INJ JOINT/BURSA W/US: CPT

## 2025-04-15 RX ORDER — TRIAMCINOLONE ACETONIDE 40 MG/ML
40 INJECTION, SUSPENSION INTRA-ARTICULAR; INTRAMUSCULAR ONCE
Status: COMPLETED | OUTPATIENT
Start: 2025-04-15 | End: 2025-04-15

## 2025-04-15 RX ORDER — LIDOCAINE HYDROCHLORIDE 10 MG/ML
9 INJECTION, SOLUTION INFILTRATION; PERINEURAL ONCE
Status: COMPLETED | OUTPATIENT
Start: 2025-04-15 | End: 2025-04-15

## 2025-04-15 RX ADMIN — LIDOCAINE HYDROCHLORIDE 9 ML: 10 INJECTION, SOLUTION INFILTRATION; PERINEURAL at 08:43

## 2025-04-15 RX ADMIN — TRIAMCINOLONE ACETONIDE 40 MG: 40 INJECTION, SUSPENSION INTRA-ARTICULAR; INTRAMUSCULAR at 08:43

## 2025-04-15 NOTE — TELEPHONE ENCOUNTER
Patient came in office stating she would like to have Cologuard ordered for her to have done.     Call back number is [phone]

## 2025-04-15 NOTE — PROGRESS NOTES
mLs by mouth 4 times daily 1200 mL 3    ZEPBOUND 15 MG/0.5ML SOAJ subCUTAneous auto-injector pen INJECT 15MG SUBCUTANEOUSLY EVERY 7 DAYS      dilTIAZem (CARDIZEM CD) 120 MG extended release capsule Take 1 capsule by mouth daily      hydroCHLOROthiazide (HYDRODIURIL) 25 MG tablet Take 1 tablet by mouth daily      buPROPion (WELLBUTRIN XL) 150 MG extended release tablet Take 1 tablet by mouth daily      propranolol (INDERAL) 10 MG tablet Take 1 tablet by mouth 3 times daily as needed (Palpitations)      furosemide (LASIX) 20 MG tablet Take 1 tablet by mouth daily       No current facility-administered medications for this visit.      Patient Active Problem List   Diagnosis    Fitting and adjustment of gastric lap band    GERD (gastroesophageal reflux disease)    Essential hypertension    Bariatric surgery status    Abnormal laboratory test    TIA (transient ischemic attack)    LAP-BAND surgery status    Obesity (BMI 30-39.9)    Elevated blood sugar    Panic attack    History of cardiac radiofrequency ablation    Severe obesity (BMI 35.0-39.9) with comorbidity    Generalized anxiety disorder    Sleep disturbance    Chest pain    Paroxysmal atrial fibrillation (HCC)      Family History   Problem Relation Age of Onset    Heart Disease Father     No Known Problems Mother       Social History     Socioeconomic History    Marital status:      Spouse name: None    Number of children: None    Years of education: None    Highest education level: None   Tobacco Use    Smoking status: Never    Smokeless tobacco: Never   Substance and Sexual Activity    Alcohol use: Not Currently    Drug use: Never    Sexual activity: Yes     Partners: Male     Social Drivers of Health     Financial Resource Strain: Low Risk  (4/3/2024)    Overall Financial Resource Strain (CARDIA)     Difficulty of Paying Living Expenses: Not hard at all   Food Insecurity: No Food Insecurity (4/3/2024)    Hunger Vital Sign     Worried About Running Out of

## 2025-04-24 LAB
A/G RATIO: 1.6 RATIO (ref 1.1–2.6)
ALBUMIN: 4.3 G/DL (ref 3.5–5)
ALP BLD-CCNC: 140 U/L (ref 40–120)
ALT SERPL-CCNC: 11 U/L (ref 5–40)
ANION GAP SERPL CALCULATED.3IONS-SCNC: 8 MMOL/L (ref 3–15)
AST SERPL-CCNC: 15 U/L (ref 10–37)
BASOPHILS # BLD: 1 % (ref 0–2)
BASOPHILS ABSOLUTE: 0.1 K/UL (ref 0–0.2)
BILIRUB SERPL-MCNC: 0.6 MG/DL (ref 0.2–1.2)
BUN BLDV-MCNC: 27 MG/DL (ref 6–22)
CALCIUM SERPL-MCNC: 9.2 MG/DL (ref 8.4–10.5)
CHLORIDE BLD-SCNC: 103 MMOL/L (ref 98–110)
CHOLESTEROL, TOTAL: 197 MG/DL (ref 110–200)
CHOLESTEROL/HDL RATIO: 3.1 (ref 0–5)
CO2: 30 MMOL/L (ref 20–32)
CREAT SERPL-MCNC: 0.9 MG/DL (ref 0.8–1.4)
EOSINOPHIL # BLD: 2 % (ref 0–6)
EOSINOPHILS ABSOLUTE: 0.2 K/UL (ref 0–0.5)
ESTIMATED AVERAGE GLUCOSE: 120 MG/DL (ref 91–123)
GFR, ESTIMATED: >60 ML/MIN/1.73 SQ.M.
GLOBULIN: 2.7 G/DL (ref 2–4)
GLUCOSE: 96 MG/DL (ref 70–99)
HBA1C MFR BLD: 5.8 % (ref 4.8–5.6)
HCT VFR BLD CALC: 40.8 % (ref 35.1–48.3)
HDLC SERPL-MCNC: 64 MG/DL
HEMOGLOBIN: 13.3 G/DL (ref 11.7–16.1)
LDL CHOLESTEROL: 116 MG/DL (ref 50–99)
LDL/HDL RATIO: 1.8
LYMPHOCYTES # BLD: 28 % (ref 20–45)
LYMPHOCYTES ABSOLUTE: 2.5 K/UL (ref 1–4.8)
MCH RBC QN AUTO: 29 PG (ref 26–34)
MCHC RBC AUTO-ENTMCNC: 33 G/DL (ref 31–36)
MCV RBC AUTO: 88 FL (ref 80–99)
MONOCYTES ABSOLUTE: 0.8 K/UL (ref 0.1–1)
MONOCYTES: 10 % (ref 3–12)
NEUTROPHILS ABSOLUTE: 5.1 K/UL (ref 1.8–7.7)
NEUTROPHILS SEGMENTED: 59 % (ref 40–75)
NON-HDL CHOLESTEROL: 133 MG/DL
PDW BLD-RTO: 13.1 % (ref 10–15.5)
PLATELET # BLD: 331 K/UL (ref 140–440)
PMV BLD AUTO: 9.1 FL (ref 9–13)
POTASSIUM SERPL-SCNC: 3.9 MMOL/L (ref 3.5–5.5)
RBC # BLD: 4.64 M/UL (ref 3.8–5.2)
SODIUM BLD-SCNC: 141 MMOL/L (ref 133–145)
TOTAL PROTEIN: 7 G/DL (ref 6.2–8.1)
TRIGL SERPL-MCNC: 82 MG/DL (ref 40–149)
TSH SERPL DL<=0.05 MIU/L-ACNC: 1.11 MCU/ML (ref 0.27–4.2)
VITAMIN B-12: 480 PG/ML (ref 211–911)
VLDLC SERPL CALC-MCNC: 16 MG/DL (ref 8–30)
WBC # BLD: 8.7 K/UL (ref 4–11)

## 2025-04-28 DIAGNOSIS — R79.9 ABNORMAL FINDING OF BLOOD CHEMISTRY, UNSPECIFIED: ICD-10-CM

## 2025-04-28 DIAGNOSIS — Z13.220 SCREENING CHOLESTEROL LEVEL: ICD-10-CM

## 2025-04-28 DIAGNOSIS — I10 ESSENTIAL HYPERTENSION: ICD-10-CM

## 2025-04-28 DIAGNOSIS — R68.89 OTHER GENERAL SYMPTOMS AND SIGNS: ICD-10-CM

## 2025-05-06 ENCOUNTER — HOSPITAL ENCOUNTER (OUTPATIENT)
Facility: HOSPITAL | Age: 65
Discharge: HOME | End: 2025-05-09
Payer: COMMERCIAL

## 2025-05-06 ENCOUNTER — HOSPITAL ENCOUNTER (OUTPATIENT)
Dept: WOMENS IMAGING | Facility: HOSPITAL | Age: 65
Discharge: HOME | End: 2025-05-09
Payer: COMMERCIAL

## 2025-05-06 VITALS — HEIGHT: 63 IN | WEIGHT: 190 LBS | BODY MASS INDEX: 33.66 KG/M2

## 2025-05-06 DIAGNOSIS — Z12.31 VISIT FOR SCREENING MAMMOGRAM: ICD-10-CM

## 2025-05-06 DIAGNOSIS — Z78.0 POST-MENOPAUSAL: ICD-10-CM

## 2025-05-06 PROCEDURE — 77080 DXA BONE DENSITY AXIAL: CPT

## 2025-05-06 PROCEDURE — 77063 BREAST TOMOSYNTHESIS BI: CPT

## 2025-05-21 RX ORDER — OMEPRAZOLE 20 MG/1
20 CAPSULE, DELAYED RELEASE ORAL
Qty: 90 CAPSULE | Refills: 1 | Status: SHIPPED | OUTPATIENT
Start: 2025-05-21

## 2025-05-21 NOTE — TELEPHONE ENCOUNTER
Patient called requesting a refill on medication. Would like for prescription to be sent to [facility] in [place], [place].

## 2025-05-23 RX ORDER — NALTREXONE HYDROCHLORIDE 50 MG/1
TABLET, FILM COATED ORAL
COMMUNITY

## 2025-05-23 RX ORDER — ALBUTEROL SULFATE 90 UG/1
INHALANT RESPIRATORY (INHALATION)
COMMUNITY

## 2025-05-29 ENCOUNTER — OFFICE VISIT (OUTPATIENT)
Facility: CLINIC | Age: 65
End: 2025-05-29
Payer: COMMERCIAL

## 2025-05-29 VITALS
HEIGHT: 63 IN | OXYGEN SATURATION: 99 % | HEART RATE: 72 BPM | BODY MASS INDEX: 36.64 KG/M2 | WEIGHT: 206.8 LBS | TEMPERATURE: 97.4 F | SYSTOLIC BLOOD PRESSURE: 138 MMHG | RESPIRATION RATE: 18 BRPM | DIASTOLIC BLOOD PRESSURE: 77 MMHG

## 2025-05-29 DIAGNOSIS — G47.9 SLEEP DISTURBANCE: ICD-10-CM

## 2025-05-29 DIAGNOSIS — K21.9 GASTROESOPHAGEAL REFLUX DISEASE WITHOUT ESOPHAGITIS: ICD-10-CM

## 2025-05-29 DIAGNOSIS — M81.0 AGE-RELATED OSTEOPOROSIS WITHOUT CURRENT PATHOLOGICAL FRACTURE: Primary | ICD-10-CM

## 2025-05-29 PROCEDURE — 3075F SYST BP GE 130 - 139MM HG: CPT | Performed by: NURSE PRACTITIONER

## 2025-05-29 PROCEDURE — 99214 OFFICE O/P EST MOD 30 MIN: CPT | Performed by: NURSE PRACTITIONER

## 2025-05-29 PROCEDURE — 1123F ACP DISCUSS/DSCN MKR DOCD: CPT | Performed by: NURSE PRACTITIONER

## 2025-05-29 PROCEDURE — 3078F DIAST BP <80 MM HG: CPT | Performed by: NURSE PRACTITIONER

## 2025-05-29 RX ORDER — ERGOCALCIFEROL 1.25 MG/1
50000 CAPSULE, LIQUID FILLED ORAL WEEKLY
Qty: 13 CAPSULE | Refills: 3 | Status: SHIPPED | OUTPATIENT
Start: 2025-05-29

## 2025-05-29 RX ORDER — ALENDRONATE SODIUM 70 MG/1
70 TABLET ORAL
Qty: 12 TABLET | Refills: 1 | Status: SHIPPED | OUTPATIENT
Start: 2025-05-29

## 2025-05-29 RX ORDER — ACETAMINOPHEN 500 MG
1 TABLET ORAL DAILY
Qty: 90 TABLET | Refills: 1 | Status: SHIPPED | OUTPATIENT
Start: 2025-05-29

## 2025-05-29 SDOH — ECONOMIC STABILITY: FOOD INSECURITY: WITHIN THE PAST 12 MONTHS, YOU WORRIED THAT YOUR FOOD WOULD RUN OUT BEFORE YOU GOT MONEY TO BUY MORE.: NEVER TRUE

## 2025-05-29 SDOH — ECONOMIC STABILITY: FOOD INSECURITY: WITHIN THE PAST 12 MONTHS, THE FOOD YOU BOUGHT JUST DIDN'T LAST AND YOU DIDN'T HAVE MONEY TO GET MORE.: NEVER TRUE

## 2025-05-29 SDOH — ECONOMIC STABILITY: INCOME INSECURITY: IN THE LAST 12 MONTHS, WAS THERE A TIME WHEN YOU WERE NOT ABLE TO PAY THE MORTGAGE OR RENT ON TIME?: NO

## 2025-05-29 SDOH — ECONOMIC STABILITY: TRANSPORTATION INSECURITY
IN THE PAST 12 MONTHS, HAS THE LACK OF TRANSPORTATION KEPT YOU FROM MEDICAL APPOINTMENTS OR FROM GETTING MEDICATIONS?: NO

## 2025-05-29 SDOH — ECONOMIC STABILITY: TRANSPORTATION INSECURITY
IN THE PAST 12 MONTHS, HAS LACK OF TRANSPORTATION KEPT YOU FROM MEETINGS, WORK, OR FROM GETTING THINGS NEEDED FOR DAILY LIVING?: NO

## 2025-05-29 ASSESSMENT — PATIENT HEALTH QUESTIONNAIRE - PHQ9
1. LITTLE INTEREST OR PLEASURE IN DOING THINGS: NOT AT ALL
2. FEELING DOWN, DEPRESSED OR HOPELESS: NOT AT ALL
SUM OF ALL RESPONSES TO PHQ QUESTIONS 1-9: 0

## 2025-05-29 NOTE — PROGRESS NOTES
Leeanne Maria presents today for   Chief Complaint   Patient presents with    6 Month Follow-Up    Discuss Labs    Results    Weight Management       Is someone accompanying this pt? no    Is the patient using any DME equipment during OV? no    Health Maintenance Due   Topic Date Due    Hepatitis C screen  Never done    DTaP/Tdap/Td vaccine (1 - Tdap) Never done    Shingles vaccine (1 of 2) Never done    Pneumococcal 50+ years Vaccine (1 of 1 - PCV) 06/28/2008    Respiratory Syncytial Virus (RSV) Pregnant or age 60 yrs+ (1 - Risk 60-74 years 1-dose series) Never done    COVID-19 Vaccine (4 - 2024-25 season) 09/01/2024    Depression Screen  03/01/2025         \"Have you been to the ER, urgent care clinic since your last visit?  Hospitalized since your last visit?\"    NO    “Have you seen or consulted any other health care providers outside our system since your last visit?”    NO           
Final     Bone Density = Osteoporosis on 5/6/2025.  Will prescribe Alendronate, Vitamin D and Calcium at this visit.    Patient received Left Knee Kenalog Injection on 4/15/2025 by CASSIDY Lou, due to history of primary osteoarthritis of left knee and left knee pain.    Physical examination performed:  Bilateral ear tympanic membrane visualized during otoscopic examination revealed no abnormalities.  Cardiac auscultation revealed no arrhythmias or murmurs.  Bilateral lung sounds clear to auscultation in all fields.  Abdomen soft and nontender, bowel sounds normoactive in all 4 quadrants.  There is no observed bilateral lower extremity edema.     Assessment/Plan:    Age-related osteoporosis without current pathological fracture.  Prescribed Alendronate 70 mg tablet every 7 days, Ergocalciferol 50,000 units q. 7 days and Calcium 1200 mg daily for management of Age-related osteoporosis without current pathological fracture.  Gastroesophageal reflux disease without esophagitis.  Continue Omeprazole 20 mg delayed-release capsule daily before breakfast for management of gastroesophageal reflux disease without esophagitis.  Sleep disturbance.  Continue Doxepin 25 mg capsule nightly for management of sleep disturbance.      I have discussed the diagnosis with the patient and the intended plan as seen in the above orders.  The patient has received an after-visit summary and questions were answered concerning future plans.  I have discussed medication side effects and warnings with the patient as well. I have reviewed the plan of care with the patient, accepted their input and they are in agreement with the treatment goals.       Kun De, ALEXANDER - NP  May 29, 2025

## 2025-07-16 ENCOUNTER — OFFICE VISIT (OUTPATIENT)
Facility: CLINIC | Age: 65
End: 2025-07-16
Payer: MEDICARE

## 2025-07-16 VITALS
OXYGEN SATURATION: 97 % | BODY MASS INDEX: 36.36 KG/M2 | WEIGHT: 205.2 LBS | HEIGHT: 63 IN | SYSTOLIC BLOOD PRESSURE: 122 MMHG | RESPIRATION RATE: 17 BRPM | HEART RATE: 72 BPM | DIASTOLIC BLOOD PRESSURE: 67 MMHG | TEMPERATURE: 98.1 F

## 2025-07-16 DIAGNOSIS — M81.0 AGE-RELATED OSTEOPOROSIS WITHOUT CURRENT PATHOLOGICAL FRACTURE: ICD-10-CM

## 2025-07-16 DIAGNOSIS — R53.81 MALAISE AND FATIGUE: ICD-10-CM

## 2025-07-16 DIAGNOSIS — R05.1 ACUTE COUGH: Primary | ICD-10-CM

## 2025-07-16 DIAGNOSIS — R53.83 MALAISE AND FATIGUE: ICD-10-CM

## 2025-07-16 PROCEDURE — 99214 OFFICE O/P EST MOD 30 MIN: CPT | Performed by: NURSE PRACTITIONER

## 2025-07-16 PROCEDURE — 1036F TOBACCO NON-USER: CPT | Performed by: NURSE PRACTITIONER

## 2025-07-16 PROCEDURE — 3017F COLORECTAL CA SCREEN DOC REV: CPT | Performed by: NURSE PRACTITIONER

## 2025-07-16 PROCEDURE — 1160F RVW MEDS BY RX/DR IN RCRD: CPT | Performed by: NURSE PRACTITIONER

## 2025-07-16 PROCEDURE — G8399 PT W/DXA RESULTS DOCUMENT: HCPCS | Performed by: NURSE PRACTITIONER

## 2025-07-16 PROCEDURE — G8417 CALC BMI ABV UP PARAM F/U: HCPCS | Performed by: NURSE PRACTITIONER

## 2025-07-16 PROCEDURE — 1125F AMNT PAIN NOTED PAIN PRSNT: CPT | Performed by: NURSE PRACTITIONER

## 2025-07-16 PROCEDURE — 3078F DIAST BP <80 MM HG: CPT | Performed by: NURSE PRACTITIONER

## 2025-07-16 PROCEDURE — 1123F ACP DISCUSS/DSCN MKR DOCD: CPT | Performed by: NURSE PRACTITIONER

## 2025-07-16 PROCEDURE — G8427 DOCREV CUR MEDS BY ELIG CLIN: HCPCS | Performed by: NURSE PRACTITIONER

## 2025-07-16 PROCEDURE — 3074F SYST BP LT 130 MM HG: CPT | Performed by: NURSE PRACTITIONER

## 2025-07-16 PROCEDURE — 1090F PRES/ABSN URINE INCON ASSESS: CPT | Performed by: NURSE PRACTITIONER

## 2025-07-16 PROCEDURE — 1159F MED LIST DOCD IN RCRD: CPT | Performed by: NURSE PRACTITIONER

## 2025-07-16 RX ORDER — BENZONATATE 200 MG/1
200 CAPSULE ORAL 3 TIMES DAILY PRN
Qty: 30 CAPSULE | Refills: 0 | Status: SHIPPED | OUTPATIENT
Start: 2025-07-16 | End: 2025-07-26

## 2025-07-16 RX ORDER — AMOXICILLIN 500 MG/1
500 CAPSULE ORAL 2 TIMES DAILY
Qty: 14 CAPSULE | Refills: 0 | Status: SHIPPED | OUTPATIENT
Start: 2025-07-16 | End: 2025-07-23

## 2025-07-16 NOTE — PROGRESS NOTES
History of Present Illness  Leeanne Maria is a 65 y.o. female who presents today for:    Chief Complaint   Patient presents with    Head Congestion    Ear Pain     Pt reports a cough since Saturday, she reports other family members have bronchitis.       Past Medical History  Past Medical History:   Diagnosis Date    Arrhythmia     A fib    Atrial fibrillation (HCC)     Hypertension         Surgical History  Past Surgical History:   Procedure Laterality Date    ABDOMEN SURGERY  12 yrs ago    Lab band surgery    CARDIAC ELECTROPHYSIOLOGY MAPPING AND ABLATION      CARDIAC SURGERY  10/01/2022    Ablation for a  fib    CARPAL TUNNEL RELEASE       SECTION      HAND SURGERY  20 yrs ago    Carpel tunnel    JOINT REPLACEMENT      KNEE SURGERY  2021    Jiffy knee right leg    ORTHOPEDIC SURGERY Bilateral     carpel tunnel    OTHER SURGICAL HISTORY      lap band    TOTAL KNEE ARTHROPLASTY Right 2020    TUBAL LIGATION          Current Medications  Current Outpatient Medications   Medication Sig    vitamin D (ERGOCALCIFEROL) 1.25 MG (89518 UT) CAPS capsule Take 1 capsule by mouth once a week    alendronate (FOSAMAX) 70 MG tablet Take 1 tablet by mouth every 7 days    Calcium Carbonate-Vit D-Min (CALCIUM 1200) 8249-1328 MG-UNIT CHEW Take 1 tablet by mouth daily    omeprazole (PRILOSEC) 20 MG delayed release capsule Take 1 capsule by mouth every morning (before breakfast)    doxepin (SINEQUAN) 25 MG capsule TAKE 1 CAPSULE BY MOUTH NIGHTLY    dilTIAZem (CARDIZEM CD) 120 MG extended release capsule Take 1 capsule by mouth daily    hydroCHLOROthiazide (HYDRODIURIL) 25 MG tablet Take 1 tablet by mouth daily    buPROPion (WELLBUTRIN XL) 150 MG extended release tablet Take 1 tablet by mouth daily    propranolol (INDERAL) 10 MG tablet Take 1 tablet by mouth 3 times daily as needed (Palpitations)    furosemide (LASIX) 20 MG tablet Take 1 tablet by mouth daily    naltrexone (DEPADE) 50 MG tablet TAKE 1/2 TAB BY MOUTH

## 2025-07-16 NOTE — PROGRESS NOTES
Leeanne Pedro Maria presents today for   Chief Complaint   Patient presents with    Head Congestion    Ear Pain     Pt reports a cough since Saturday, she reports other family members have bronchitis.       Is someone accompanying this pt? no    Is the patient using any DME equipment during OV? no    Health Maintenance Due   Topic Date Due    Hepatitis C screen  Never done    DTaP/Tdap/Td vaccine (1 - Tdap) Never done    Shingles vaccine (1 of 2) Never done    Pneumococcal 50+ years Vaccine (1 of 1 - PCV) 06/28/2008    Respiratory Syncytial Virus (RSV) Pregnant or age 60 yrs+ (1 - Risk 60-74 years 1-dose series) Never done    COVID-19 Vaccine (4 - 2024-25 season) 09/01/2024    Annual Wellness Visit (Medicare)  Never done         \"Have you been to the ER, urgent care clinic since your last visit?  Hospitalized since your last visit?\"    NO    “Have you seen or consulted any other health care providers outside our system since your last visit?”    NO

## 2025-08-26 ENCOUNTER — OFFICE VISIT (OUTPATIENT)
Age: 65
End: 2025-08-26

## 2025-08-26 DIAGNOSIS — M25.562 LEFT KNEE PAIN, UNSPECIFIED CHRONICITY: ICD-10-CM

## 2025-08-26 DIAGNOSIS — M17.12 UNILATERAL PRIMARY OSTEOARTHRITIS, LEFT KNEE: Primary | ICD-10-CM

## 2025-08-26 RX ORDER — TRIAMCINOLONE ACETONIDE 40 MG/ML
40 INJECTION, SUSPENSION INTRA-ARTICULAR; INTRAMUSCULAR ONCE
Status: COMPLETED | OUTPATIENT
Start: 2025-08-26 | End: 2025-08-26

## 2025-08-26 RX ORDER — LIDOCAINE HYDROCHLORIDE 10 MG/ML
9 INJECTION, SOLUTION INFILTRATION; PERINEURAL ONCE
Status: COMPLETED | OUTPATIENT
Start: 2025-08-26 | End: 2025-08-26

## 2025-08-26 RX ADMIN — LIDOCAINE HYDROCHLORIDE 9 ML: 10 INJECTION, SOLUTION INFILTRATION; PERINEURAL at 10:44

## 2025-08-26 RX ADMIN — TRIAMCINOLONE ACETONIDE 40 MG: 40 INJECTION, SUSPENSION INTRA-ARTICULAR; INTRAMUSCULAR at 10:45

## (undated) DEVICE — TUBING CNTRST INJ HI PRESSURE 108 IN

## (undated) DEVICE — CATH BIDIR JCRV 8F 3.5X115MM -- THERMOCOOL SF

## (undated) DEVICE — SHTH GUID 8.5F 17MM SM CRV -- CARTO VIZIGO

## (undated) DEVICE — SYSTEM CLOSURE 6-12 FR VEN VASC VASCADE MVP

## (undated) DEVICE — CATH EP MAP 2-6-2 7FR F CRV -- PENTARAY

## (undated) DEVICE — INTRO SHTH TRANSSEPTAL SL-1 --

## (undated) DEVICE — 48" PROBE COVER W/GEL, ULTRASOUND, STERILE: Brand: SITE-RITE

## (undated) DEVICE — CATHETER EP 7FR L115CM 2-8-2MM SPC TIP 2MM DF CRV ADV COMPR

## (undated) DEVICE — PINNACLE INTRODUCER SHEATH: Brand: PINNACLE

## (undated) DEVICE — NEEDLE TRANSSEPTAL AD 18GA L71CM 30DEG BVL BRK-1 XS CRV S

## (undated) DEVICE — SURGICAL PROCEDURE TRAY CRD CATH 3 PRT

## (undated) DEVICE — TUBE SET IRR PUMP THERMALCOOL -- SMARTABLATE

## (undated) DEVICE — PATCH CARTO 3 EXT REF --

## (undated) DEVICE — STERILE (15.2 TAPERED TO 7.6 X 183CM) POLYETHYLENE ACCORDION-FOLDED COVER FOR USE WITH SIEMENS ACUNAV ULTRASOUND CATHETER FAMILY CONNECTOR: Brand: SWIFTLINK TRANSDUCER COVER

## (undated) DEVICE — REM POLYHESIVE ADULT PATIENT RETURN ELECTRODE: Brand: VALLEYLAB

## (undated) DEVICE — 3M™ WARMING BLANKET, UPPER BODY, 10 PER CASE, 42268: Brand: BAIR HUGGER™

## (undated) DEVICE — PRESSURE MONITORING SET: Brand: TRUWAVE

## (undated) DEVICE — GDWIRE TRANSCPTL 135CM LNG --

## (undated) DEVICE — MEDI-TRACE CADENCE ADULT, DEFIBRILLATION ELECTRODE -RTS  (10 PR/PK) - PHYSIO-CONTROL: Brand: MEDI-TRACE CADENCE

## (undated) DEVICE — CATHETER REPROC ULTRASOUND SIEMENS IMAGING SOUNDSTAR

## (undated) DEVICE — TOWEL,OR,DSP,ST,BLUE,DLX,6/PK,12PK/CS: Brand: MEDLINE